# Patient Record
Sex: FEMALE | Race: BLACK OR AFRICAN AMERICAN | Employment: UNEMPLOYED | URBAN - METROPOLITAN AREA
[De-identification: names, ages, dates, MRNs, and addresses within clinical notes are randomized per-mention and may not be internally consistent; named-entity substitution may affect disease eponyms.]

---

## 2017-09-12 LAB — PAP SMEAR, EXTERNAL: NORMAL

## 2019-01-03 ENCOUNTER — OFFICE VISIT (OUTPATIENT)
Dept: FAMILY MEDICINE CLINIC | Age: 54
End: 2019-01-03

## 2019-01-03 VITALS
HEIGHT: 64 IN | TEMPERATURE: 98.9 F | OXYGEN SATURATION: 98 % | RESPIRATION RATE: 18 BRPM | SYSTOLIC BLOOD PRESSURE: 161 MMHG | HEART RATE: 97 BPM | DIASTOLIC BLOOD PRESSURE: 107 MMHG | BODY MASS INDEX: 32.04 KG/M2 | WEIGHT: 187.7 LBS

## 2019-01-03 DIAGNOSIS — I10 ESSENTIAL HYPERTENSION: ICD-10-CM

## 2019-01-03 DIAGNOSIS — G89.29 CHRONIC LEFT SHOULDER PAIN: Primary | ICD-10-CM

## 2019-01-03 DIAGNOSIS — F32.89 OTHER DEPRESSION: ICD-10-CM

## 2019-01-03 DIAGNOSIS — M25.512 CHRONIC LEFT SHOULDER PAIN: Primary | ICD-10-CM

## 2019-01-03 DIAGNOSIS — D50.9 IRON DEFICIENCY ANEMIA, UNSPECIFIED IRON DEFICIENCY ANEMIA TYPE: ICD-10-CM

## 2019-01-03 PROBLEM — E55.9 VITAMIN D DEFICIENCY: Status: ACTIVE | Noted: 2018-03-20

## 2019-01-03 PROBLEM — F32.1 MODERATE MAJOR DEPRESSION (HCC): Status: ACTIVE | Noted: 2019-01-03

## 2019-01-03 PROBLEM — D25.9 UTERINE LEIOMYOMA: Status: ACTIVE | Noted: 2017-09-12

## 2019-01-03 PROBLEM — E78.2 MIXED HYPERLIPIDEMIA: Status: ACTIVE | Noted: 2018-03-20

## 2019-01-03 PROBLEM — F32.A DEPRESSION: Status: ACTIVE | Noted: 2017-06-15

## 2019-01-03 RX ORDER — ERGOCALCIFEROL 1.25 MG/1
50000 CAPSULE ORAL
Qty: 4 CAP | Refills: 2 | Status: SHIPPED | OUTPATIENT
Start: 2019-01-03 | End: 2019-03-28

## 2019-01-03 RX ORDER — OXYCODONE HYDROCHLORIDE 5 MG/1
TABLET ORAL
Refills: 0 | COMMUNITY
Start: 2018-12-28 | End: 2019-04-25 | Stop reason: ALTCHOICE

## 2019-01-03 RX ORDER — DICLOFENAC SODIUM 75 MG/1
TABLET, DELAYED RELEASE ORAL
Refills: 1 | COMMUNITY
Start: 2018-12-28 | End: 2019-04-25 | Stop reason: ALTCHOICE

## 2019-01-03 RX ORDER — ERGOCALCIFEROL 1.25 MG/1
50000 CAPSULE ORAL
COMMUNITY
Start: 2018-12-27 | End: 2019-01-03

## 2019-01-03 RX ORDER — AMLODIPINE BESYLATE 10 MG/1
10 TABLET ORAL DAILY
Qty: 90 TAB | Refills: 3 | Status: SHIPPED | OUTPATIENT
Start: 2019-01-03 | End: 2019-11-29

## 2019-01-03 RX ORDER — AMLODIPINE BESYLATE 5 MG/1
1 TABLET ORAL
COMMUNITY
Start: 2018-05-08 | End: 2019-01-03 | Stop reason: SDUPTHER

## 2019-01-03 RX ORDER — ESCITALOPRAM OXALATE 10 MG/1
10 TABLET ORAL DAILY
Qty: 30 TAB | Refills: 2 | Status: SHIPPED | OUTPATIENT
Start: 2019-01-03 | End: 2019-01-22

## 2019-01-03 NOTE — PROGRESS NOTES
Geo Weldon presents today for   Chief Complaint   Patient presents with   [facility]    Shoulder Pain     Patient stated that she had two left shoulder surgery and still don't have full range of motion. Patient stated that her pain is 6/10.  Medication Refill    Depression     Patient stated that she been depressed for sometime now. Is someone accompanying this pt? no    Is the patient using any DME equipment during OV? no    Depression Screening:  PHQ over the last two weeks 1/3/2019   Little interest or pleasure in doing things Nearly every day   Feeling down, depressed, irritable, or hopeless Nearly every day   Total Score PHQ 2 6   Trouble falling or staying asleep, or sleeping too much More than half the days   Feeling tired or having little energy More than half the days   Poor appetite, weight loss, or overeating Nearly every day   Feeling bad about yourself - or that you are a failure or have let yourself or your family down Nearly every day   Trouble concentrating on things such as school, work, reading, or watching TV Nearly every day   Moving or speaking so slowly that other people could have noticed; or the opposite being so fidgety that others notice Nearly every day   Thoughts of being better off dead, or hurting yourself in some way Nearly every day   PHQ 9 Score 25   How difficult have these problems made it for you to do your work, take care of your home and get along with others Very difficult       Learning Assessment:  Learning Assessment 1/3/2019   PRIMARY LEARNER Patient   HIGHEST LEVEL OF EDUCATION - PRIMARY LEARNER  GRADUATED HIGH SCHOOL OR GED   BARRIERS PRIMARY LEARNER NONE   CO-LEARNER CAREGIVER No   PRIMARY LANGUAGE ENGLISH   LEARNER PREFERENCE PRIMARY LISTENING   ANSWERED BY self   RELATIONSHIP SELF       Abuse Screening:  Abuse Screening Questionnaire 1/3/2019   Do you ever feel afraid of your partner?  N   Are you in a relationship with someone who physically or mentally threatens you? N   Is it safe for you to go home? Y           Health Maintenance Due   Topic Date Due    DTaP/Tdap/Td series (1 - Tdap) 02/01/1987    PAP AKA CERVICAL CYTOLOGY  02/01/1987    Shingrix Vaccine Age 50> (1 of 2) 02/01/2016    BREAST CANCER SCRN MAMMOGRAM  02/01/2016    FOBT Q 1 YEAR AGE 50-75  02/01/2016    Influenza Age 9 to Adult  08/01/2018   . Health Maintenance reviewed and discussed and ordered per Provider. Dominique Prado is updated on all       Coordination of Care:  1. Have you been to the ER, urgent care clinic since your last visit? Hospitalized since your last visit? No    2. Have you seen or consulted any other health care providers outside of the [address] [place] [address] since your last visit? Include any pap smears or colon screening. No    Per-Dr. Farzad López ok medication not taking to be deleted. Advance Directive:  1. Do you have an advance directive in place? Patient Reply:no    2. If not, would you like material regarding how to put one in place? Patient Reply: no    Provider notified of depression screening. Patient stated that she has thoughts of harming herself.
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
## 2019-01-03 NOTE — PATIENT INSTRUCTIONS
Patient Instructions:    1. For your L shoulder pain  Get your bloodwork done this week  After the bloodwork is done, then get your MRI with contrast done (Scheduling will call to arrange the appointment)  Schedule an appointment with Orthopedics Dr. Ondina Moses [phone]    2. For your High Blood Pressure  Increase your Amlodipine from 5mg to 10mg daily   the new prescription at the pharmacy    3. For your Depression  Start Lexapro 10mg daily  Return in 2 wks  If your depression gets worse, especially if you are actively considering hurting yourself or someone else, then go immediately to the ER. Frozen Shoulder: Care Instructions  Your Care Instructions    Frozen shoulder is stiffness, pain, and trouble moving your shoulder. It may happen after an injury or overuse, or from a disease such as diabetes or a stroke. You may have pain that keeps you from using your shoulder. However, you need to move your shoulder. If you do not move it, it will get more stiff and sore. Your doctor may order an X-ray to make sure there is not another cause for your stiff shoulder. You can treat frozen shoulder with heat, stretching, over-the-counter pain medicines, and physical therapy. Your doctor also may inject medicine into your shoulder to reduce pain and swelling. It can take a year or more to get better. Surgery is almost never needed. Follow-up care is a key part of your treatment and safety. Be sure to make and go to all appointments, and call your doctor if you are having problems. It's also a good idea to know your test results and keep a list of the medicines you take. How can you care for yourself at home? · Take pain medicines exactly as directed. ? If the doctor gave you a prescription medicine for pain, take it as prescribed. ? If you are not taking a prescription pain medicine, ask your doctor if you can take an over-the-counter medicine.   · Put a heating pad set on low or a warm, wet towel wrapped in plastic on your shoulder. The heat may make it easier to stretch your shoulder. · Follow your doctor's advice for stretches and exercises. · Go to physical therapy if your doctor suggests it. · Try these stretching exercises to reduce stiffness if your doctor says it is okay. Do the exercises slowly to avoid injury. Put a warm, wet towel on your shoulder before exercising. Stop any exercise that increases pain. ? Pendulum exercise. While leaning forward and holding onto a table or the back of a chair with your good arm, bend at the waist, allowing your affected arm to hang straight down. Swing the affected arm back and forth like a pendulum, then in circles that start small and gradually grow larger as pain allows. Try this for about 2 or 3 minutes, several times a day. Once pain begins to go away, you can do this exercise while holding a 1- or 2-pound weight. ? Wall climbing (to the side). Stand with your side to a wall so that your fingers can just touch it. Then turn so your body is turned slightly toward the wall. Walk the fingers of your injured arm up the wall as high as pain permits. Hold that position for a count of 15 to 30 seconds. Walk your fingers down to the starting position. Repeat 2 to 4 times, trying to reach higher each time. ? Wall climbing (to the front). Face a wall, standing so your fingers can just touch it. Walk the fingers of your affected arm up the wall as high as pain permits. Hold that position for a count of 15 to 30 seconds. Slowly walk your fingers to the starting position. Repeat 2 to 4 times, trying to reach higher each time. When should you call for help?   Call your doctor now or seek immediate medical care if:    · You have severe pain.     · Your arm is cool or pale or changes color.     · You have tingling or numbness in your arm.    Watch closely for changes in your health, and be sure to contact your doctor if:    · You have increased pain.     · You have new pain that develops in another area. For example, you have pain in your arm, hand, or elbow.     · You do not get better as expected. Where can you learn more? Go to http://liz-rianna.info/. Enter C075 in the search box to learn more about \"Frozen Shoulder: Care Instructions. \"  Current as of: November 29, 2017  Content Version: 11.8  © 5829-5969 [employer]. Care instructions adapted under license by [employer] (which disclaims liability or warranty for this information). If you have questions about a medical condition or this instruction, always ask your healthcare professional. [place] [address] any warranty or liability for your use of this information. High Blood Pressure: Care Instructions  Your Care Instructions    If your blood pressure is usually above 130/80, you have high blood pressure, or hypertension. That means the top number is 130 or higher or the bottom number is 80 or higher, or both. Despite what a lot of people think, high blood pressure usually doesn't cause headaches or make you feel dizzy or lightheaded. It usually has no symptoms. But it does increase your risk for heart attack, stroke, and kidney or eye damage. The higher your blood pressure, the more your risk increases. Your doctor will give you a goal for your blood pressure. Your goal will be based on your health and your age. Lifestyle changes, such as eating healthy and being active, are always important to help lower blood pressure. You might also take medicine to reach your blood pressure goal.  Follow-up care is a key part of your treatment and safety. Be sure to make and go to all appointments, and call your doctor if you are having problems. It's also a good idea to know your test results and keep a list of the medicines you take. How can you care for yourself at home?   Medical treatment  · If you stop taking your medicine, your blood pressure will go back up. You may take one or more types of medicine to lower your blood pressure. Be safe with medicines. Take your medicine exactly as prescribed. Call your doctor if you think you are having a problem with your medicine. · Talk to your doctor before you start taking aspirin every day. Aspirin can help certain people lower their risk of a heart attack or stroke. But taking aspirin isn't right for everyone, because it can cause serious bleeding. · See your doctor regularly. You may need to see the doctor more often at first or until your blood pressure comes down. · If you are taking blood pressure medicine, talk to your doctor before you take decongestants or anti-inflammatory medicine, such as ibuprofen. Some of these medicines can raise blood pressure. · Learn how to check your blood pressure at home. Lifestyle changes  · Stay at a healthy weight. This is especially important if you put on weight around the waist. Losing even 10 pounds can help you lower your blood pressure. · If your doctor recommends it, get more exercise. Walking is a good choice. Bit by bit, increase the amount you walk every day. Try for at least 30 minutes on most days of the week. You also may want to swim, bike, or do other activities. · Avoid or limit alcohol. Talk to your doctor about whether you can drink any alcohol. · Try to limit how much sodium you eat to less than 2,300 milligrams (mg) a day. Your doctor may ask you to try to eat less than 1,500 mg a day. · Eat plenty of fruits (such as bananas and oranges), vegetables, legumes, whole grains, and low-fat dairy products. · Lower the amount of saturated fat in your diet. Saturated fat is found in animal products such as milk, cheese, and meat. Limiting these foods may help you lose weight and also lower your risk for heart disease. · Do not smoke. Smoking increases your risk for heart attack and stroke.  If you need help quitting, talk to your doctor about stop-smoking programs and medicines. These can increase your chances of quitting for good. When should you call for help? Call 911 anytime you think you may need emergency care. This may mean having symptoms that suggest that your blood pressure is causing a serious heart or blood vessel problem. Your blood pressure may be over 180/120.   For example, call 911 if:    · You have symptoms of a heart attack. These may include:  ? Chest pain or pressure, or a strange feeling in the chest.  ? Sweating. ? Shortness of breath. ? Nausea or vomiting. ? Pain, pressure, or a strange feeling in the back, neck, jaw, or upper belly or in one or both shoulders or arms. ? Lightheadedness or sudden weakness. ? A fast or irregular heartbeat.     · You have symptoms of a stroke. These may include:  ? Sudden numbness, tingling, weakness, or loss of movement in your face, arm, or leg, especially on only one side of your body. ? Sudden vision changes. ? Sudden trouble speaking. ? Sudden confusion or trouble understanding simple statements. ? Sudden problems with walking or balance. ? A sudden, severe headache that is different from past headaches.     · You have severe back or belly pain.    Do not wait until your blood pressure comes down on its own. Get help right away.   Call your doctor now or seek immediate care if:    · Your blood pressure is much higher than normal (such as 180/120 or higher), but you don't have symptoms.     · You think high blood pressure is causing symptoms, such as:  ? Severe headache.  ? Blurry vision.    Watch closely for changes in your health, and be sure to contact your doctor if:    · Your blood pressure measures higher than your doctor recommends at least 2 times. That means the top number is higher or the bottom number is higher, or both.     · You think you may be having side effects from your blood pressure medicine. Where can you learn more?   Go to http://liz-rianna.info/. Enter L490 in the search box to learn more about \"High Blood Pressure: Care Instructions. \"  Current as of: December 6, 2017  Content Version: 11.8  © 8442-8213 [employer]. Care instructions adapted under license by [employer] (which disclaims liability or warranty for this information). If you have questions about a medical condition or this instruction, always ask your healthcare professional. [place] [address] any warranty or liability for your use of this information. Depression and Chronic Disease: Care Instructions  Your Care Instructions    A chronic disease is one that you have for a long time. Some chronic diseases can be controlled, but they usually cannot be cured. Depression is common in people with chronic diseases, but it often goes unnoticed. Many people have concerns about seeking treatment for a mental health problem. You may think it's a sign of weakness, or you don't want people to know about it. It's important to overcome these reasons for not seeking treatment. Treating depression or anxiety is good for your health. Follow-up care is a key part of your treatment and safety. Be sure to make and go to all appointments, and call your doctor if you are having problems. It's also a good idea to know your test results and keep a list of the medicines you take. How can you care for yourself at home? Watch for symptoms of depression  The symptoms of depression are often subtle at first. You may think they are caused by your disease rather than depression. Or you may think it is normal to be depressed when you have a chronic disease. If you are depressed you may:  · Feel sad or hopeless. · Feel guilty or worthless. · Not enjoy the things you used to enjoy. · Feel hopeless, as though life is not worth living. · Have trouble thinking or remembering.   · Have low energy, and you may not eat or sleep well.  · Pull away from others. · Think often about death or killing yourself. (Keep the numbers for these national suicide hotlines: 3-374-242-TALK [1-900.445.8664] and 6-912-TFPRAAN [1-604.640.6209]. )  Get treatment  By treating your depression, you can feel more hopeful and have more energy. If you feel better, you may take better care of yourself, so your health may improve. · Talk to your doctor if you have any changes in mood during treatment for your disease. · Ask your doctor for help. Counseling, antidepressant medicine, or a combination of the two can help most people with depression. Often a combination works best. Counseling can also help you cope with having a chronic disease. When should you call for help? Call 911 anytime you think you may need emergency care. For example, call if:    · You feel like hurting yourself or someone else.     · Someone you know has depression and is about to attempt or is attempting suicide.   [facility] your doctor now or seek immediate medical care if:    · You hear voices.     · Someone you know has depression and:  ? Starts to give away his or her possessions. ? Uses illegal drugs or drinks alcohol heavily. ? Talks or writes about death, including writing suicide notes or talking about guns, knives, or pills. ? Starts to spend a lot of time alone. ? Acts very aggressively or suddenly appears calm.    Watch closely for changes in your health, and be sure to contact your doctor if:    · You do not get better as expected. Where can you learn more? Go to http://liz-rianna.info/. Enter H706 in the search box to learn more about \"Depression and Chronic Disease: Care Instructions. \"  Current as of: December 7, 2017  Content Version: 11.8  © 7097-1373 [employer]. Care instructions adapted under license by [employer] (which disclaims liability or warranty for this information).  If you have questions about a medical condition or this instruction, always ask your healthcare professional. [place] [address] any warranty or liability for your use of this information.

## 2019-01-08 ENCOUNTER — HOSPITAL ENCOUNTER (OUTPATIENT)
Dept: LAB | Age: 54
Discharge: HOME | End: 2019-01-08
Payer: MEDICAID

## 2019-01-08 DIAGNOSIS — D50.9 IRON DEFICIENCY ANEMIA, UNSPECIFIED IRON DEFICIENCY ANEMIA TYPE: ICD-10-CM

## 2019-01-08 DIAGNOSIS — I10 ESSENTIAL HYPERTENSION: ICD-10-CM

## 2019-01-08 LAB
ALBUMIN SERPL-MCNC: 3.6 G/DL (ref 3.4–5)
ALBUMIN/GLOB SERPL: 1 {RATIO} (ref 0.8–1.7)
ALP SERPL-CCNC: 73 U/L (ref 45–117)
ALT SERPL-CCNC: 40 U/L (ref 13–56)
ANION GAP SERPL CALC-SCNC: 7 MMOL/L (ref 3–18)
AST SERPL-CCNC: 34 U/L (ref 15–37)
BASOPHILS # BLD: 0 K/UL (ref 0–0.1)
BASOPHILS NFR BLD: 1 % (ref 0–2)
BILIRUB SERPL-MCNC: 0.5 MG/DL (ref 0.2–1)
BUN SERPL-MCNC: 10 MG/DL (ref 7–18)
BUN/CREAT SERPL: 12 (ref 12–20)
CALCIUM SERPL-MCNC: 8.7 MG/DL (ref 8.5–10.1)
CHLORIDE SERPL-SCNC: 107 MMOL/L (ref 100–108)
CO2 SERPL-SCNC: 27 MMOL/L (ref 21–32)
CREAT SERPL-MCNC: 0.83 MG/DL (ref 0.6–1.3)
DIFFERENTIAL METHOD BLD: ABNORMAL
EOSINOPHIL # BLD: 0.1 K/UL (ref 0–0.4)
EOSINOPHIL NFR BLD: 2 % (ref 0–5)
ERYTHROCYTE [DISTWIDTH] IN BLOOD BY AUTOMATED COUNT: 15.7 % (ref 11.6–14.5)
GLOBULIN SER CALC-MCNC: 3.5 G/DL (ref 2–4)
GLUCOSE SERPL-MCNC: 103 MG/DL (ref 74–99)
HCT VFR BLD AUTO: 33.5 % (ref 35–45)
HGB BLD-MCNC: 11.7 G/DL (ref 12–16)
IRON SATN MFR SERPL: 14 %
IRON SERPL-MCNC: 53 UG/DL (ref 50–175)
LYMPHOCYTES # BLD: 2.1 K/UL (ref 0.9–3.6)
LYMPHOCYTES NFR BLD: 45 % (ref 21–52)
MCH RBC QN AUTO: 26.2 PG (ref 24–34)
MCHC RBC AUTO-ENTMCNC: 34.9 G/DL (ref 31–37)
MCV RBC AUTO: 75.1 FL (ref 74–97)
MONOCYTES # BLD: 0.4 K/UL (ref 0.05–1.2)
MONOCYTES NFR BLD: 10 % (ref 3–10)
NEUTS SEG # BLD: 1.9 K/UL (ref 1.8–8)
NEUTS SEG NFR BLD: 42 % (ref 40–73)
PLATELET # BLD AUTO: 260 K/UL (ref 135–420)
PMV BLD AUTO: 10.9 FL (ref 9.2–11.8)
POTASSIUM SERPL-SCNC: 3.7 MMOL/L (ref 3.5–5.5)
PROT SERPL-MCNC: 7.1 G/DL (ref 6.4–8.2)
RBC # BLD AUTO: 4.46 M/UL (ref 4.2–5.3)
SODIUM SERPL-SCNC: 141 MMOL/L (ref 136–145)
TIBC SERPL-MCNC: 392 UG/DL (ref 250–450)
WBC # BLD AUTO: 4.5 K/UL (ref 4.6–13.2)

## 2019-01-08 PROCEDURE — 83540 ASSAY OF IRON: CPT

## 2019-01-08 PROCEDURE — 85025 COMPLETE CBC W/AUTO DIFF WBC: CPT

## 2019-01-08 PROCEDURE — 80053 COMPREHEN METABOLIC PANEL: CPT

## 2019-01-08 PROCEDURE — 36415 COLL VENOUS BLD VENIPUNCTURE: CPT

## 2019-01-09 NOTE — PROGRESS NOTES
Lab results reviewed - letter sent, pt will f/u in 2 wks to discuss. Her anemia is long-standing and not a new finding.

## 2019-01-10 DIAGNOSIS — G89.29 CHRONIC LEFT SHOULDER PAIN: Primary | ICD-10-CM

## 2019-01-10 DIAGNOSIS — M25.512 CHRONIC LEFT SHOULDER PAIN: Primary | ICD-10-CM

## 2019-01-10 NOTE — PROGRESS NOTES
Received faxed request from radiology center regarding the MRI with contrast of the L shoulder ordered for this pt to r/o OM given her pain and decreased ROM after surgery - per the radiologist, pt needs arthrogram of the L shoulder ordered along with the MRI to have procedure done. Order for Arthrogram placed.

## 2019-01-22 ENCOUNTER — OFFICE VISIT (OUTPATIENT)
Dept: FAMILY MEDICINE CLINIC | Age: 54
End: 2019-01-22

## 2019-01-22 VITALS
BODY MASS INDEX: 31.72 KG/M2 | WEIGHT: 185.8 LBS | TEMPERATURE: 97.4 F | RESPIRATION RATE: 18 BRPM | HEIGHT: 64 IN | HEART RATE: 83 BPM | SYSTOLIC BLOOD PRESSURE: 130 MMHG | DIASTOLIC BLOOD PRESSURE: 89 MMHG

## 2019-01-22 DIAGNOSIS — I10 ESSENTIAL HYPERTENSION: Primary | ICD-10-CM

## 2019-01-22 DIAGNOSIS — D72.819 LEUKOPENIA, UNSPECIFIED TYPE: ICD-10-CM

## 2019-01-22 DIAGNOSIS — F33.2 SEVERE EPISODE OF RECURRENT MAJOR DEPRESSIVE DISORDER, WITHOUT PSYCHOTIC FEATURES (HCC): ICD-10-CM

## 2019-01-22 DIAGNOSIS — F32.89 OTHER DEPRESSION: ICD-10-CM

## 2019-01-22 DIAGNOSIS — E55.9 VITAMIN D DEFICIENCY: ICD-10-CM

## 2019-01-22 DIAGNOSIS — K59.03 DRUG-INDUCED CONSTIPATION: ICD-10-CM

## 2019-01-22 DIAGNOSIS — E78.2 MIXED HYPERLIPIDEMIA: ICD-10-CM

## 2019-01-22 DIAGNOSIS — D50.8 OTHER IRON DEFICIENCY ANEMIA: ICD-10-CM

## 2019-01-22 RX ORDER — DOCUSATE SODIUM 100 MG/1
100 CAPSULE, LIQUID FILLED ORAL 2 TIMES DAILY
Qty: 60 CAP | Refills: 2 | Status: SHIPPED | OUTPATIENT
Start: 2019-01-22 | End: 2019-04-22

## 2019-01-22 RX ORDER — ESCITALOPRAM OXALATE 20 MG/1
20 TABLET ORAL DAILY
Qty: 90 TAB | Refills: 3 | Status: SHIPPED | OUTPATIENT
Start: 2019-01-22 | End: 2020-12-02 | Stop reason: ALTCHOICE

## 2019-01-22 NOTE — PROGRESS NOTES
Janet Araiza is here for 2 week follow up of her labwork results and depression and HTN    At last visit, her BP was high but she had run out of her amlodipine 5mg daily - at the last visit her amlodipine dose was increase to 10mg daily and she is taking that and her BP is now at goal 130/80 and much better than last visit. She has not checked any home Bps since last visit. Her CBC showed a stable anemia with mild leukopenia and normal iron levels - she is currently taking iron supplements and has had long-standing anemia due to fibroids. Had a normal colonoscopy 1 year ago. CMP was normal.     At last visit she was having significant depression episodes and was started on Lexapro 10mg daily. She reports little improvement since last visit. Denies SI/HI. PHQ-9 shows that she has symptoms nearly every day for 7 out of 9 questions. Depression is making her daily life somewhat difficult. She reports her stool has become hard since starting the lexapro. She has the MRI of the shoulder scheduled for 1/28/19. Surgical History  Past Surgical History:   Procedure Laterality Date    HX SHOULDER ARTHROSCOPY Left 2017 and oct 2018        Medications  Current Outpatient Medications   Medication Sig Dispense Refill    MULTIVITAMIN-IRON-FOLIC ACID PO Take  by Mouth.  diclofenac EC (VOLTAREN) 75 mg EC tablet TAKE 1 TABLET BY MOUTH TWICE A DAY  1    oxyCODONE IR (ROXICODONE) 5 mg immediate release tablet TAKE 1 TABLET BY MOUTH EVERY 6 HOURS AS NEEDED FOR PAIN  0    ergocalciferol (ERGOCALCIFEROL) 50,000 unit capsule Take 1 Cap by mouth every seven (7) days for 84 days. 4 Cap 2    amLODIPine (NORVASC) 10 mg tablet Take 1 Tab by mouth daily. 90 Tab 3    escitalopram oxalate (LEXAPRO) 10 mg tablet Take 1 Tab by mouth daily.  30 Tab 2       Allergies  Allergies   Allergen Reactions    Prednisone Other (comments)     Coughs up blood         Family History  Family History   Problem Relation Age of Onset    Diabetes Mother     Heart Attack Mother     Diabetes Father    Matt Prior Lupus Sister     Depression Other     Depression Paternal Uncle        Social History  Social History     Socioeconomic History    Marital status: UNKNOWN     Spouse name: Not on file    Number of children: Not on file    Years of education: Not on file    Highest education level: Not on file   Social Needs    Financial resource strain: Not on file    Food insecurity - worry: Not on file    Food insecurity - inability: Not on file   [employer] needs - medical: Not on file   [employer] needs - non-medical: Not on file   Occupational History    Not on file   Tobacco Use    Smoking status: Not on file   Substance and Sexual Activity    Alcohol use: Not on file    Drug use: Not on file    Sexual activity: Not on file   Other Topics Concern    Not on file   Social History Narrative    Not on file       Problem List  Patient Active Problem List   Diagnosis Code    Moderate major depression ([facility] [address] F32.1    Depression F32.9    HTN (hypertension) I10    Mixed hyperlipidemia E78.2    Uterine leiomyoma D25.9    Vitamin D deficiency E55.9    GRETCHEN (iron deficiency anemia) D50.9       Review of Systems  ROS   Review of Systems   Constitutional: Negative. HENT: Negative. Respiratory: Negative. Cardiovascular: Negative. Psych: Depression, stable, as per HPI  All other systems reviewed and are negative. Vital Signs  Vitals:    01/22/19 1029   BP: 130/89   Pulse: 83   Resp: 18   Temp: 97.4 °F (36.3 °C)   TempSrc: Oral   Weight: 185 lb 12.8 oz (84.3 kg)   Height: 5' 4\" (1.626 m)   PainSc:   0 - No pain   LMP: 01/22/2019       Physical Exam  Physical Exam   General:   Alert, calm,  in no acute distress. HEENT:  NC/AT, Anicteric, MMM, OP clear, NL nares, TMs pearly gray B/L, Neck supple. No cervical LAD. Cardiovasc:   RRR, no MRG  Pulmonary:   Lungs clear bilaterally. Normal respiratory effort.  No wheezing, ronchi or rales, equal BS B/L, Good air entry. Extremities:   No edema, no TTP bilateral calves. LEs warm and well-perfused. Neuro:   Alert and oriented, no focal deficits. Psych:  Mood improved, still flat affect and depressed but not tearful. Answers questions appropriately. Good judgement. Denies SI/HI      Diagnostics    MRI and Arthrogram of L shoulder pending - scheduled for 1/28/19    Results  Results for orders placed or performed during the hospital encounter of 92/67/80   METABOLIC PANEL, COMPREHENSIVE   Result Value Ref Range    Sodium 141 136 - 145 mmol/L    Potassium 3.7 3.5 - 5.5 mmol/L    Chloride 107 100 - 108 mmol/L    CO2 27 21 - 32 mmol/L    Anion gap 7 3.0 - 18 mmol/L    Glucose 103 (H) 74 - 99 mg/dL    BUN 10 7.0 - 18 MG/DL    Creatinine 0.83 0.6 - 1.3 MG/DL    BUN/Creatinine ratio 12 12 - 20      GFR est AA >60 >60 ml/min/1.73m2    GFR est non-AA >60 >60 ml/min/1.73m2    Calcium 8.7 8.5 - 10.1 MG/DL    Bilirubin, total 0.5 0.2 - 1.0 MG/DL    ALT (SGPT) 40 13 - 56 U/L    AST (SGOT) 34 15 - 37 U/L    Alk. phosphatase 73 45 - 117 U/L    Protein, total 7.1 6.4 - 8.2 g/dL    Albumin 3.6 3.4 - 5.0 g/dL    Globulin 3.5 2.0 - 4.0 g/dL    A-G Ratio 1.0 0.8 - 1.7     CBC WITH AUTOMATED DIFF   Result Value Ref Range    WBC 4.5 (L) 4.6 - 13.2 K/uL    RBC 4.46 4.20 - 5.30 M/uL    HGB 11.7 (L) 12.0 - 16.0 g/dL    HCT 33.5 (L) 35.0 - 45.0 %    MCV 75.1 74.0 - 97.0 FL    MCH 26.2 24.0 - 34.0 PG    MCHC 34.9 31.0 - 37.0 g/dL    RDW 15.7 (H) 11.6 - 14.5 %    PLATELET 622 764 - 354 K/uL    MPV 10.9 9.2 - 11.8 FL    NEUTROPHILS 42 40 - 73 %    LYMPHOCYTES 45 21 - 52 %    MONOCYTES 10 3 - 10 %    EOSINOPHILS 2 0 - 5 %    BASOPHILS 1 0 - 2 %    ABS. NEUTROPHILS 1.9 1.8 - 8.0 K/UL    ABS. LYMPHOCYTES 2.1 0.9 - 3.6 K/UL    ABS. MONOCYTES 0.4 0.05 - 1.2 K/UL    ABS. EOSINOPHILS 0.1 0.0 - 0.4 K/UL    ABS.  BASOPHILS 0.0 0.0 - 0.1 K/UL    DF AUTOMATED     IRON PROFILE   Result Value Ref Range    Iron 53 50 - 175 ug/dL    TIBC 392 250 - 450 ug/dL    Iron % saturation 14 %               Assessment and Plan    ICD-10-CM ICD-9-CM    1. Essential hypertension I10 401.9    2. Severe episode of recurrent major depressive disorder, without psychotic features ([facility] [address] F33.2 296.33    3. Mixed hyperlipidemia E78.2 272.2 LIPID PANEL   4. Vitamin D deficiency E55.9 268.9 VITAMIN D, 25 HYDROXY   5. Other iron deficiency anemia D50.8 280.8 CBC WITH AUTOMATED DIFF   6. Leukopenia, unspecified type D72.819 288.50 CBC WITH AUTOMATED DIFF   7. Drug-induced constipation K59.03 564.09 docusate sodium (COLACE) 100 mg capsule     E980.5    8. Other depression F32.89 311 escitalopram oxalate (LEXAPRO) 20 mg tablet      TSH AND FREE T4      REFERRAL TO PSYCHIATRY     Orders Placed This Encounter    CBC WITH AUTOMATED DIFF     Standing Status:   Future     Standing Expiration Date:   1/23/2020    LIPID PANEL     Standing Status:   Future     Standing Expiration Date:   1/23/2020    VITAMIN D, 25 HYDROXY     Standing Status:   Future     Standing Expiration Date:   1/23/2020    TSH AND FREE T4     Standing Status:   Future     Standing Expiration Date:   1/23/2020    REFERRAL TO PSYCHIATRY     Referral Priority:   Routine     Referral Type:   Behavioral Health     Referral Reason:   Specialty Services Required     Referred to Provider:   Martha Claire MD     Requested Specialty:   Psychiatry     Number of Visits Requested:   1    docusate sodium (COLACE) 100 mg capsule     Sig: Take 1 Cap by mouth two (2) times a day for 90 days. Dispense:  60 Cap     Refill:  2    escitalopram oxalate (LEXAPRO) 20 mg tablet     Sig: Take 1 Tab by mouth daily. Dispense:  90 Tab     Refill:  3     1. HTN  Well controlled  Continue Amlodipine 10mg daily  DASH diet discussed - handout given with AVS    2.  MDD  She still feels depressed and does not feel much improvement so far after 2 wks of Lexapro 10mg daily   She denies SI/HI but has symptoms nearly every day for 7 of the 9 PHQ-9 questions  Advised her to increase Lexapro from 10mg to 20mg daily  Advised her to increase exercise gradually to a goal of 30-60 min 4-6 days per week and lose weight  Also discussed techniques such as yoga and meditation to control depression  Discussed that there is newer research showing that Mediterranean Diet may help improve mood/depression - Discussed Mediterranean diet, she is already a vegetarian and also does not eat dairy but she does eat fish - Mediterranean diet handout given  Referral to psychiatry given today as well to assist with med titration and psychotherapy  Advised to f/u in 3 wks but return sooner if her symptoms are worsening - check TFTs with next labs in 3 wks  Go to the ER if you develop SI/HI which she currently denies    3. MIXED HLD  She has not had her lipids checked in over a year  Advised will check fasting lipids at next visit in 3 wks - advised her to come fasting for that visit. 4. VIT D DEF  Continue Ergocalciferol weekly  Check Vit D level with next set of labs in 3 wks    5. IRON DEF ANEMIA  Reviewed all labs dated 1/8/19 with patient today - CBC shows stable anemia, iron panel is normal but she is taking iron supplement and this has been long-standing anemia due to fibroids  She reports she had a normal colonoscopy 1 year ago  Continue iron supplements    6. LEUKOPENIA  She has mild leukopenia - this could be genetic or transient  Recommend repeat CBC at next visit in 3 wks     7. CONSTIPATION  This just started after initiating Lexapro so it is likely a side effect  Advised to start Miralax dissolve 1 capful in 8oz of liquid and take it daily.  May increase to BID for 3 days if you do not have a BM for 3 or more days  Increase water intake to 10-12 glasses per day, eat diet high in fiber with plenty of fruits and veggies  If your constipation does not improve/resolve with miralax and dietary changes, then add colace stool softener 100mg BID    After care summary printed and reviewed with patient. Plan reviewed with patient. Questions answered. Patient verbalized understanding of plan and is in agreement with plan. Patient to follow up in one week or earlier if symptoms worsen. Return to work letter given. Encouraged the use of my chart. Ximena Montemayor MD, [place].[facility]  Internal Medicine/Pediatrics

## 2019-01-22 NOTE — PATIENT INSTRUCTIONS
1. For your Constipation: Drink 10-12 glasses of water per day, eat plenty of fruits and vegetables. Start Over-the-counter Miralax 1 capful dissolved in 8 oz of water once a day to maintain soft stools. If you have not had a bowel movement in 3 days or more then take 1 capful dissolved in 8oz of liquid twice a day for 3 days until you have passed a soft stool and then return to once a day dosing. If the Miralax is not working then add colace stool softener twice a day    2. For your high BP: continue Amlodipine 10mg daily    3. For your depression: increase your Lexapro from 10mg daily to 20mg daily, try a Mediterranean diet to improve mood. Try exercising, yoga, meditation to improve mood - goal is to exercise 30-60 min 4-6 days per week. Also, schedule an appointment with psychiatry    Return in 3 wks     Recovering From Depression: Care Instructions  Your Care Instructions    Taking good care of yourself is important as you recover from depression. In time, your symptoms will fade as your treatment takes hold. Do not give up. Instead, focus your energy on getting better. Your mood will improve. It just takes some time. Focus on things that can help you feel better, such as being with friends and family, eating well, and getting enough rest. But take things slowly. Do not do too much too soon. You will begin to feel better gradually. Follow-up care is a key part of your treatment and safety. Be sure to make and go to all appointments, and call your doctor if you are having problems. It's also a good idea to know your test results and keep a list of the medicines you take. How can you care for yourself at home? Be realistic  · If you have a large task to do, break it up into smaller steps you can handle, and just do what you can. · You may want to put off important decisions until your depression has lifted.  If you have plans that will have a major impact on your life, such as marriage, divorce, or a job change, try to wait a bit. Talk it over with friends and loved ones who can help you look at the overall picture first.  · Reaching out to people for help is important. Do not isolate yourself. Let your family and friends help you. Find someone you can trust and confide in, and talk to that person. · Be patient, and be kind to yourself. Remember that depression is not your fault and is not something you can overcome with willpower alone. Treatment is necessary for depression, just like for any other illness. Feeling better takes time, and your mood will improve little by little. Stay active  · Stay busy and get outside. Take a walk, or try some other light exercise. · Talk with your doctor about an exercise program. Exercise can help with mild depression. · Go to a movie or concert. Take part in a Spiritism activity or other social gathering. Go to a [employer] game. · Ask a friend to have dinner with you. Take care of yourself  · Eat a balanced diet with plenty of fresh fruits and vegetables, whole grains, and lean protein. If you have lost your appetite, eat small snacks rather than large meals. · Avoid drinking alcohol or using illegal drugs. Do not take medicines that have not been prescribed for you. They may interfere with medicines you may be taking for depression, or they may make your depression worse. · Take your medicines exactly as they are prescribed. You may start to feel better within 1 to 3 weeks of taking antidepressant medicine. But it can take as many as 6 to 8 weeks to see more improvement. If you have questions or concerns about your medicines, or if you do not notice any improvement by 3 weeks, talk to your doctor. · If you have any side effects from your medicine, tell your doctor. Antidepressants can make you feel tired, dizzy, or nervous. Some people have dry mouth, constipation, headaches, sexual problems, or diarrhea.  Many of these side effects are mild and will go away on their own after you have been taking the medicine for a few weeks. Some may last longer. Talk to your doctor if side effects are bothering you too much. You might be able to try a different medicine. · Get enough sleep. If you have problems sleeping:  ? Go to bed at the same time every night, and get up at the same time every morning. ? Keep your bedroom dark and quiet. ? Do not exercise after 5:00 p.m.  ? Avoid drinks with caffeine after 5:00 p.m. · Avoid sleeping pills unless they are prescribed by the doctor treating your depression. Sleeping pills may make you groggy during the day, and they may interact with other medicine you are taking. · If you have any other illnesses, such as diabetes, heart disease, or high blood pressure, make sure to continue with your treatment. Tell your doctor about all of the medicines you take, including those with or without a prescription. · Keep the numbers for these national suicide hotlines: 0-242-182-TALK (6-991.149.9125) and 6-938-JAINUKK (3-569.388.5563). If you or someone you know talks about suicide or feeling hopeless, get help right away. When should you call for help? Call 911 anytime you think you may need emergency care. For example, call if:    · You feel like hurting yourself or someone else.     · Someone you know has depression and is about to attempt or is attempting suicide.   [facility] your doctor now or seek immediate medical care if:    · You hear voices.     · Someone you know has depression and:  ? Starts to give away his or her possessions. ? Uses illegal drugs or drinks alcohol heavily. ? Talks or writes about death, including writing suicide notes or talking about guns, knives, or pills. ? Starts to spend a lot of time alone. ? Acts very aggressively or suddenly appears calm.    Watch closely for changes in your health, and be sure to contact your doctor if:    · You do not get better as expected. Where can you learn more?   Go to http://liz-rianna.info/. Enter Q789 in the search box to learn more about \"Recovering From Depression: Care Instructions. \"  Current as of: September 11, 2018  Content Version: 11.9  © 7694-4285 [employer], [employer]. Care instructions adapted under license by [employer] (which disclaims liability or warranty for this information). If you have questions about a medical condition or this instruction, always ask your healthcare professional. [address] any warranty or liability for your use of this information. Learning About the [address] Diet  What is the Mediterranean diet? The Mediterranean diet is a style of eating rather than a diet plan. It features foods eaten in [place], [place], [place] and [place], and other countries along the [facility]. It emphasizes eating foods like fish, fruits, vegetables, beans, high-fiber breads and whole grains, nuts, and olive oil. This style of eating includes limited red meat, cheese, and sweets. Why choose the Mediterranean diet? A Mediterranean-style diet may improve heart health. It contains more fat than other heart-healthy diets. But the fats are mainly from nuts, unsaturated oils (such as fish oils and olive oil), and certain nut or seed oils (such as canola, soybean, or flaxseed oil). These fats may help protect the heart and blood vessels. How can you get started on the Mediterranean diet? Here are some things you can do to switch to a more Mediterranean way of eating. What to eat  · Eat a variety of fruits and vegetables each day, such as grapes, blueberries, tomatoes, broccoli, peppers, figs, olives, spinach, eggplant, beans, lentils, and chickpeas. · Eat a variety of whole-grain foods each day, such as oats, brown rice, and whole wheat bread, pasta, and couscous. · Eat fish at least 2 times a week. Try tuna, salmon, mackerel, lake trout, herring, or sardines.   · Eat moderate amounts of low-fat dairy products, such as milk, cheese, or yogurt. · Eat moderate amounts of poultry and eggs. · Choose healthy (unsaturated) fats, such as nuts, olive oil, and certain nut or seed oils like canola, soybean, and flaxseed. · Limit unhealthy (saturated) fats, such as butter, palm oil, and coconut oil. And limit fats found in animal products, such as meat and dairy products made with whole milk. Try to eat red meat only a few times a month in very small amounts. · Limit sweets and desserts to only a few times a week. This includes sugar-sweetened drinks like soda. The Mediterranean diet may also include red wine with your meal--1 glass each day for women and up to 2 glasses a day for men. Tips for eating at home  · Use herbs, spices, garlic, lemon zest, and citrus juice instead of salt to add flavor to foods. · Add avocado slices to your sandwich instead of sidhu. · Have fish for lunch or dinner instead of red meat. Brush the fish with olive oil, and broil or grill it. · Sprinkle your salad with seeds or nuts instead of cheese. · Cook with olive or canola oil instead of butter or oils that are high in saturated fat. · Switch from 2% milk or whole milk to 1% or fat-free milk. · Dip raw vegetables in a vinaigrette dressing or hummus instead of dips made from mayonnaise or sour cream.  · Have a piece of fruit for dessert instead of a piece of cake. Try baked apples, or have some dried fruit. Tips for eating out  · Try broiled, grilled, baked, or poached fish instead of having it fried or breaded. · Ask your  to have your meals prepared with olive oil instead of butter. · Order dishes made with marinara sauce or sauces made from olive oil. Avoid sauces made from cream or mayonnaise. · Choose whole-grain breads, whole wheat pasta and pizza crust, brown rice, beans, and lentils. · Cut back on butter or margarine on bread.  Instead, you can dip your bread in a small amount of olive oil.  · Ask for a side salad or grilled vegetables instead of french fries or chips. Where can you learn more? Go to http://liz-rianna.info/. Enter 176-803-9973 in the search box to learn more about \"Learning About the Mediterranean Diet. \"  Current as of: March 28, 2018  Content Version: 11.9  © 8124-7209 [employer]. Care instructions adapted under license by [employer] (which disclaims liability or warranty for this information). If you have questions about a medical condition or this instruction, always ask your healthcare professional. [place] [address] any warranty or liability for your use of this information.

## 2019-01-22 NOTE — PROGRESS NOTES
aLta Smith presents today for   Chief Complaint   Patient presents with    Depression     follow up    Hypertension    Labs     completed 1/8/19       Is someone accompanying this pt? no    Is the patient using any DME equipment during OV? no    Depression Screening:  PHQ over the last two weeks 1/22/2019   PHQ Not Done Active Diagnosis of Depression or Bipolar Disorder   Little interest or pleasure in doing things Nearly every day   Feeling down, depressed, irritable, or hopeless Nearly every day   Total Score PHQ 2 6   Trouble falling or staying asleep, or sleeping too much Nearly every day   Feeling tired or having little energy Nearly every day   Poor appetite, weight loss, or overeating Nearly every day   Feeling bad about yourself - or that you are a failure or have let yourself or your family down Nearly every day   Trouble concentrating on things such as school, work, reading, or watching TV Nearly every day   Moving or speaking so slowly that other people could have noticed; or the opposite being so fidgety that others notice Several days   Thoughts of being better off dead, or hurting yourself in some way Several days   PHQ 9 Score 23   How difficult have these problems made it for you to do your work, take care of your home and get along with others Somewhat difficult       Learning Assessment:  Learning Assessment 1/3/2019   PRIMARY LEARNER Patient   HIGHEST LEVEL OF EDUCATION - PRIMARY LEARNER  GRADUATED HIGH SCHOOL OR GED   BARRIERS PRIMARY LEARNER NONE   CO-LEARNER CAREGIVER No   PRIMARY LANGUAGE ENGLISH   LEARNER PREFERENCE PRIMARY LISTENING   ANSWERED BY self   RELATIONSHIP SELF       Abuse Screening:  Abuse Screening Questionnaire 1/22/2019   Do you ever feel afraid of your partner? N   Are you in a relationship with someone who physically or mentally threatens you? N   Is it safe for you to go home?  Y           Health Maintenance Due   Topic Date Due    PAP AKA CERVICAL CYTOLOGY  02/01/1987  Shingrix Vaccine Age 50> (1 of 2) 02/01/2016    BREAST CANCER SCRN MAMMOGRAM  02/01/2016    FOBT Q 1 YEAR AGE 50-75  02/01/2016   . Health Maintenance reviewed and discussed and ordered per Provider. Bard Oviedo is updated on all       Coordination of Care:  1. Have you been to the ER, urgent care clinic since your last visit? Hospitalized since your last visit? No    2. Have you seen or consulted any other health care providers outside of the [address] [place] [address] since your last visit? Include any pap smears or colon screening. No    Per-Dr. Juan Francisco Tabor ok medication not taking to be deleted. Advance Directive:  1. Do you have an advance directive in place? Patient Reply:no    2. If not, would you like material regarding how to put one in place? Patient Reply: no    Provider notified of depression screening. Patient denies of any thoughts of harming herself.

## 2019-01-28 ENCOUNTER — HOSPITAL ENCOUNTER (OUTPATIENT)
Dept: GENERAL RADIOLOGY | Age: 54
Discharge: HOME | End: 2019-01-28
Attending: INTERNAL MEDICINE
Payer: MEDICAID

## 2019-01-28 ENCOUNTER — HOSPITAL ENCOUNTER (OUTPATIENT)
Dept: MRI IMAGING | Age: 54
Discharge: HOME | End: 2019-01-28
Attending: INTERNAL MEDICINE
Payer: MEDICAID

## 2019-01-28 DIAGNOSIS — G89.29 CHRONIC LEFT SHOULDER PAIN: ICD-10-CM

## 2019-01-28 DIAGNOSIS — M25.512 CHRONIC LEFT SHOULDER PAIN: ICD-10-CM

## 2019-01-28 PROCEDURE — A9577 INJ MULTIHANCE: HCPCS | Performed by: INTERNAL MEDICINE

## 2019-01-28 PROCEDURE — 23350 INJECTION FOR SHOULDER X-RAY: CPT

## 2019-01-28 PROCEDURE — 74011250636 HC RX REV CODE- 250/636: Performed by: INTERNAL MEDICINE

## 2019-01-28 PROCEDURE — 74011250636 HC RX REV CODE- 250/636

## 2019-01-28 PROCEDURE — 74011636320 HC RX REV CODE- 636/320: Performed by: INTERNAL MEDICINE

## 2019-01-28 PROCEDURE — 73222 MRI JOINT UPR EXTREM W/DYE: CPT

## 2019-01-28 RX ORDER — LIDOCAINE HYDROCHLORIDE 10 MG/ML
INJECTION, SOLUTION EPIDURAL; INFILTRATION; INTRACAUDAL; PERINEURAL
Status: COMPLETED
Start: 2019-01-28 | End: 2019-01-28

## 2019-01-28 RX ORDER — SODIUM CHLORIDE 9 MG/ML
1-20 INJECTION INTRAMUSCULAR; INTRAVENOUS; SUBCUTANEOUS
Status: COMPLETED | OUTPATIENT
Start: 2019-01-28 | End: 2019-01-28

## 2019-01-28 RX ORDER — LIDOCAINE HYDROCHLORIDE 10 MG/ML
1-10 INJECTION, SOLUTION EPIDURAL; INFILTRATION; INTRACAUDAL; PERINEURAL
Status: COMPLETED | OUTPATIENT
Start: 2019-01-28 | End: 2019-01-28

## 2019-01-28 RX ADMIN — LIDOCAINE HYDROCHLORIDE 2 ML: 10 INJECTION, SOLUTION EPIDURAL; INFILTRATION; INTRACAUDAL; PERINEURAL at 11:10

## 2019-01-28 RX ADMIN — SODIUM CHLORIDE 20 ML: 9 INJECTION INTRAMUSCULAR; INTRAVENOUS; SUBCUTANEOUS at 11:05

## 2019-01-28 RX ADMIN — GADOBENATE DIMEGLUMINE 0.1 ML: 529 INJECTION, SOLUTION INTRAVENOUS at 11:09

## 2019-01-28 RX ADMIN — IOPAMIDOL 4 ML: 612 INJECTION, SOLUTION INTRAVENOUS at 11:10

## 2019-01-28 NOTE — H&P
The patient is an appropriate candidate to undergo left shoulder arthrogram..    Patient assessed immediately prior to induction. Anesthesia plan as follows: Local/No Anesthesia. History and Physical update:  H&P was reviewed and the patient was examined. No changes have occurred in the patient's condition since the H&P was completed.     Miguel Angel Galvin MD  Vascular & Interventional Radiology  [facility]  1/28/2019

## 2019-01-28 NOTE — PROCEDURES
Vascular & Interventional Radiology Brief Procedure Note    Interventional Radiologist: Miguel Angel Galvin MD    Pre-operative Diagnosis:  Left shoulder pain    Post-operative Diagnosis: Same as pre-op dx    Procedure(s) Performed:  L shoulder arthrogram    Anesthesia:  Local and Moderate Sedation    Findings:  Uncomplicated left shoulder arthrogram, please see full dictation to follow for details.      Complications: None    Estimated Blood Loss:  minimal    Tubes and Drains: None    Specimens: None    Condition: Good     Miguel Angel Galvin MD  [facility]  Vascular & Interventional Radiology  1/28/2019

## 2019-02-18 ENCOUNTER — OFFICE VISIT (OUTPATIENT)
Dept: FAMILY MEDICINE CLINIC | Age: 54
End: 2019-02-18

## 2019-02-18 VITALS
WEIGHT: 189.2 LBS | BODY MASS INDEX: 32.3 KG/M2 | RESPIRATION RATE: 16 BRPM | HEART RATE: 84 BPM | DIASTOLIC BLOOD PRESSURE: 86 MMHG | SYSTOLIC BLOOD PRESSURE: 138 MMHG | HEIGHT: 64 IN | TEMPERATURE: 98.2 F

## 2019-02-18 DIAGNOSIS — I10 ESSENTIAL HYPERTENSION: Primary | ICD-10-CM

## 2019-02-18 DIAGNOSIS — R20.2 PARESTHESIA: ICD-10-CM

## 2019-02-18 DIAGNOSIS — E78.2 MIXED HYPERLIPIDEMIA: ICD-10-CM

## 2019-02-18 DIAGNOSIS — R73.01 IMPAIRED FASTING BLOOD SUGAR: ICD-10-CM

## 2019-02-18 DIAGNOSIS — M25.512 CHRONIC LEFT SHOULDER PAIN: ICD-10-CM

## 2019-02-18 DIAGNOSIS — G89.29 OTHER CHRONIC PAIN: ICD-10-CM

## 2019-02-18 DIAGNOSIS — D50.8 OTHER IRON DEFICIENCY ANEMIA: ICD-10-CM

## 2019-02-18 DIAGNOSIS — E55.9 VITAMIN D DEFICIENCY: ICD-10-CM

## 2019-02-18 DIAGNOSIS — G89.29 CHRONIC LEFT SHOULDER PAIN: ICD-10-CM

## 2019-02-18 DIAGNOSIS — F33.2 SEVERE EPISODE OF RECURRENT MAJOR DEPRESSIVE DISORDER, WITHOUT PSYCHOTIC FEATURES (HCC): ICD-10-CM

## 2019-02-18 RX ORDER — CYCLOBENZAPRINE HCL 5 MG
5 TABLET ORAL
Qty: 30 TAB | Refills: 1 | Status: SHIPPED | OUTPATIENT
Start: 2019-02-18 | End: 2019-04-25 | Stop reason: SDUPTHER

## 2019-02-18 NOTE — PROGRESS NOTES
Chief Complaint   Patient presents with    Follow-up     3 week    Depression    Hypertension       Health Maintenance Due   Topic Date Due    PAP AKA CERVICAL CYTOLOGY  02/01/1987    Shingrix Vaccine Age 50> (1 of 2) 02/01/2016    BREAST CANCER SCRN MAMMOGRAM  02/01/2016    FOBT Q 1 YEAR AGE 50-75  02/01/2016       Health Maintenance reviewed     1. Have you been to the ER, urgent care clinic since your last visit? Hospitalized since your last visit? No    2. Have you seen or consulted any other health care providers outside of the [address] [place] [address] since your last visit? Include any pap smears or colon screening.  No

## 2019-02-18 NOTE — PROGRESS NOTES
[address], [place], [address]  Phone [phone]; Fax [phone]      Phan Lopez is here for follow up of HTN and depression and to review MRI report of the L shoulder with MR arthrogram    MR arthrogram of shoulder did not show any rotator cuff tear but showed some postsurgical changes from prior rotator cuff surgery and biceps tendinosis - will need to f/u with ortho for her L shoulder pain and decreased ROM. At last visit her BP was well controlled so told to continue Amlodipine at 10mg daily. Home Bps: Not checking. She took amlodipine 10mg dose last night. Also here to check labwork - she is taking iron supplement and has long-standing iron-def anemia related to fibroids with recent normal colonoscopy 1 year ago and here to check lipids, vit D, CBC, CMP, iron panel - she is not fasting so will come back later this week for bloodwork. She is taking iron supplements once a day. Also she had not felt much improvement on Lexapro 10mg daily at last visit so it was increased to 20mg daily and she was advised to see psych. She states she lost psych ref so has not scheduled appt yet. She notices improvement on Lexapro 20mg daily. Denies SI/HI. Mood has improved. She last saw ortho first week in February. She went for 2nd opinion from ortho. He stopped  Her pain meds and PT. He told her he wanted to f/u after the MRI to discuss testing for possible infection related to prior surgery. She still has decreased ROM of the L arm and pain and tingling  Down arm. Surgical History  Past Surgical History:   Procedure Laterality Date    HX SHOULDER ARTHROSCOPY Left 2017 and oct 2018        Medications  Current Outpatient Medications   Medication Sig Dispense Refill    docusate sodium (COLACE) 100 mg capsule Take 1 Cap by mouth two (2) times a day for 90 days.  60 Cap 2    escitalopram oxalate (LEXAPRO) 20 mg tablet Take 1 Tab by mouth daily. 90 Tab 3    MULTIVITAMIN-IRON-FOLIC ACID PO Take  by Mouth.  diclofenac EC (VOLTAREN) 75 mg EC tablet TAKE 1 TABLET BY MOUTH TWICE A DAY  1    ergocalciferol (ERGOCALCIFEROL) 50,000 unit capsule Take 1 Cap by mouth every seven (7) days for 84 days. 4 Cap 2    amLODIPine (NORVASC) 10 mg tablet Take 1 Tab by mouth daily.  90 Tab 3    oxyCODONE IR (ROXICODONE) 5 mg immediate release tablet TAKE 1 TABLET BY MOUTH EVERY 6 HOURS AS NEEDED FOR PAIN  0       Allergies  Allergies   Allergen Reactions    Prednisone Other (comments)     Coughs up blood         Family History  Family History   Problem Relation Age of Onset    Diabetes Mother     Heart Attack Mother     Diabetes Father     Lupus Sister     Depression Other     Depression Paternal Uncle        Social History  Social History     Socioeconomic History    Marital status: UNKNOWN     Spouse name: Not on file    Number of children: Not on file    Years of education: Not on file    Highest education level: Not on file   Social Needs    Financial resource strain: Not on file    Food insecurity - worry: Not on file    Food insecurity - inability: Not on file   [employer] needs - medical: Not on file   [employer] needs - non-medical: Not on file   Occupational History    Not on file   Tobacco Use    Smoking status: Never Smoker    Smokeless tobacco: Never Used   Substance and Sexual Activity    Alcohol use: No     Frequency: Never    Drug use: No    Sexual activity: Yes     Partners: Male   Other Topics Concern    Not on file   Social History Narrative    Not on file       Problem List  Patient Active Problem List   Diagnosis Code    Moderate major depression (HCC) F32.1    Depression F32.9    HTN (hypertension) I10    Mixed hyperlipidemia E78.2    Uterine leiomyoma D25.9    Vitamin D deficiency E55.9    GRETCHEN (iron deficiency anemia) D50.9       Review of Systems  ROS   Review of Systems   Constitutional: Negative. HENT: Negative. Respiratory: Negative. Cardiovascular: Negative. All other systems reviewed and are negative. Vital Signs  Vitals:    02/18/19 1125   BP: 149/90   Pulse: 84   Resp: 16   Temp: 98.2 °F (36.8 °C)   TempSrc: Oral   Weight: 189 lb 3.2 oz (85.8 kg)   Height: 5' 4\" (1.626 m)   PainSc:   6   PainLoc: Shoulder   LMP: 01/22/2019     RPT /86    Physical Exam  Physical Exam   General:   Alert,  in no acute distress. HEENT:  NC/AT, Anicteric, MMM, OP clear, NL nares, TMs pearly gray B/L, Neck supple. No cervical LAD. No carotid bruits B/L  Cardiovasc:   RRR, no MRG  Pulmonary:   Lungs clear bilaterally. Normal respiratory effort. No wheezing, ronchi or rales, equal BS B/L, Good air entry. Abdomen:   Abdomen soft, NT, ND, NAB. Extremities:   No edema, no TTP bilateral calves. LEs warm and well-perfused. Neuro:   Alert and oriented, no focal deficits. Psych:  Normal mood, not tearful. Affect improving - not flattened          Results  Results for orders placed or performed during the hospital encounter of 17/22/22   METABOLIC PANEL, COMPREHENSIVE   Result Value Ref Range    Sodium 141 136 - 145 mmol/L    Potassium 3.7 3.5 - 5.5 mmol/L    Chloride 107 100 - 108 mmol/L    CO2 27 21 - 32 mmol/L    Anion gap 7 3.0 - 18 mmol/L    Glucose 103 (H) 74 - 99 mg/dL    BUN 10 7.0 - 18 MG/DL    Creatinine 0.83 0.6 - 1.3 MG/DL    BUN/Creatinine ratio 12 12 - 20      GFR est AA >60 >60 ml/min/1.73m2    GFR est non-AA >60 >60 ml/min/1.73m2    Calcium 8.7 8.5 - 10.1 MG/DL    Bilirubin, total 0.5 0.2 - 1.0 MG/DL    ALT (SGPT) 40 13 - 56 U/L    AST (SGOT) 34 15 - 37 U/L    Alk.  phosphatase 73 45 - 117 U/L    Protein, total 7.1 6.4 - 8.2 g/dL    Albumin 3.6 3.4 - 5.0 g/dL    Globulin 3.5 2.0 - 4.0 g/dL    A-G Ratio 1.0 0.8 - 1.7     CBC WITH AUTOMATED DIFF   Result Value Ref Range    WBC 4.5 (L) 4.6 - 13.2 K/uL    RBC 4.46 4.20 - 5.30 M/uL    HGB 11.7 (L) 12.0 - 16.0 g/dL    HCT 33.5 (L) 35.0 - 45.0 %    MCV 75.1 74.0 - 97.0 FL    MCH 26.2 24.0 - 34.0 PG    MCHC 34.9 31.0 - 37.0 g/dL    RDW 15.7 (H) 11.6 - 14.5 %    PLATELET 121 266 - 060 K/uL    MPV 10.9 9.2 - 11.8 FL    NEUTROPHILS 42 40 - 73 %    LYMPHOCYTES 45 21 - 52 %    MONOCYTES 10 3 - 10 %    EOSINOPHILS 2 0 - 5 %    BASOPHILS 1 0 - 2 %    ABS. NEUTROPHILS 1.9 1.8 - 8.0 K/UL    ABS. LYMPHOCYTES 2.1 0.9 - 3.6 K/UL    ABS. MONOCYTES 0.4 0.05 - 1.2 K/UL    ABS. EOSINOPHILS 0.1 0.0 - 0.4 K/UL    ABS. BASOPHILS 0.0 0.0 - 0.1 K/UL    DF AUTOMATED     IRON PROFILE   Result Value Ref Range    Iron 53 50 - 175 ug/dL    TIBC 392 250 - 450 ug/dL    Iron % saturation 14 %       Result Notes for MRI SHOULDER LT W CONT     Notes recorded by Ksenia Martinez MD on 1/28/2019 at 2:09 PM EST  Reviewed, letter sent - will discuss in more detail at 2/12/19 appt          Study Result     EXAM: MRI SHOULDER LT W CONT     CLINICAL INDICATION/HISTORY: 46-year-old patient with left shoulder pain and  limited range of motion. Prior left shoulder arthroscopies.     COMPARISON: Correlation is made to injection imaging from same day arthrogram.     TECHNIQUE: MRI examination of the left shoulder performed with a local coil. Axial PD images with and without fat suppression; oblique coronal T1 and T2  fat-suppressed images; as well as oblique sagittal PD and T2 fat-suppressed  images obtained. Anatomic detail of this exam is limited by the presence of  motion artifact.     _______________     FINDINGS:     ROTATOR CUFF: Evaluation of the rotator cuff is limited by the presence of  motion artifact. On both fluid sensitive and postcontrast imaging, no discrete  rotator cuff tendon tear is demonstrated. Subscapularis tendon appears intact. The rotator cuff muscle bulk is normal. No fatty atrophy or fatty infiltration.   Similarly, no evidence of abnormal intramuscular signal is demonstrated to  suggest sequela of subacute denervation.     LABRUM: The labrum as visualized appears intact, with detail limited by the  degree of motion artifact.     BICEPS TENDON COMPLEX: Evidence of prior biceps tenodesis is noted with a  truncated appearance to the intra-articular portion of the long head biceps  tendon. Solitary suture anchor noted within the proximal portion of the  intertubercular sulcus.     CORACOACROMIAL ARCH: There are mild degenerative changes of the  acromioclavicular joint, to include marginal proliferative changes, small joint  effusion, and mild pericapsular edema. Likely prior acromioplasty with residual  type I acromial morphology.     CAPSULE/LIGAMENTS: The superior, middle, and inferior glenohumeral ligaments are  unremarkable. Coracohumeral and coracoacromial ligaments are unremarkable as  well. No MR findings of adhesive capsulitis.     BONES/JOINT SPACE: No significant degenerative osteoarthropathy or  chondromalacia of the glenohumeral joint. There is no evidence for fracture,  contusion, bony [place], [place] Sachs lesion.     PERIARTICULAR: There is a small quantity of T1 hyperintense fluid within the  subacromial-subdeltoid bursa.     _______________     IMPRESSION  IMPRESSION:     1. Motion limited examination of the left shoulder.     2. Within the limitations of motion artifact, no discrete rotator cuff tendon  tear is demonstrated. Although no discrete tendon tear is demonstrated,  arthrographic contrast is present within the subacromial-subdeltoid bursa. This  finding may reflect sequela of prior cuff repair, which can be a normal finding  relating to postoperative cuff permeability. Correlation with surgical history  recommended. Normal rotator cuff muscle bulk and signal.     3. Prior biceps tenodesis and likely acromioplasty with residual type I acromial  morphology. Assessment and Plan  1. Essential hypertension  BP is reasonably well controlled today  Continue Amlodipine 10mg daily  DASH diet discussed - handout given    2.  Severe episode of recurrent major depressive disorder, without psychotic features ([address]  Denies SI/HI  Mood improving on Lexapro 20mg daily  Continue Lexapro 20mg daily  Advised to return this week for fasting labwork - check TFTs  She did not schedule appt with psych yet bc she lost referral - reprinted ref today and stressed importance of scheduling appt with psych.     - TSH AND FREE T4; Future    3. Mixed hyperlipidemia  Advised to return this week for fasting labwork  Check fasting lipid panel    - LIPID PANEL; Future    4. Vitamin D deficiency  Check vit D level with labs this week    - METABOLIC PANEL, COMPREHENSIVE; Future  - VITAMIN D, 25 HYDROXY; Future    5. Other iron deficiency anemia  She Is taking iron supplements once a day  Check CBC and iron panel with labs as per lab orders    - CBC WITH AUTOMATED DIFF; Future  - IRON PROFILE; Future  - FERRITIN; Future    6. Impaired fasting blood sugar  Her last glucose was 103  Check A1c    - HEMOGLOBIN A1C W/O EAG; Future    7. Chronic left shoulder pain  She has chronic shoulder pain and decreased ROM after her prior rotator cuff repair surgery  Her pain is not improving  She states ortho took her off the roxicodone and stopped the PT  I reviewed the MRI/arthroscopy report in detail with patient today - it does not show any rotator cuff tear, just post-surgical changes and biceps tendonosis - I gave handout on Biceps tendonitis exercises and she will continue HEP recommended by PT and her orhto  Advised her to f/u with ortho as he was going to do further testing to r/o infection related to her prior surgery - no sign of Osteomyelitis or other infection on the MRI  She asks for pain medication - start Flexeril 5mg qhs, side effects including drowsiness/dizziness discussed, advised to take at bedtime, do not take if need to be alert or driving, do not take with alcohol or benadryl or other sedating meds.  Advised that if it does not cause drowsiness, then she may take up to TID PRN pain  Also, gave ref to pain management. Check ESR to r/o infection    - SED RATE (ESR); Future    8. Other chronic pain  Treat with cyclobenzaprine as above  Ref to pain management    - REFERRAL TO PAIN MANAGEMENT  - cyclobenzaprine (FLEXERIL) 5 mg tablet; Take 1 Tab by mouth three (3) times daily (with meals). Dispense: 30 Tab; Refill: 1    9. Paresthesia  She has some numbness and tingling down the L arm associated with the L shoulder decreased ROM and pain  Check B12 and folate  HEP, flexeril, return to ortho and pain management as above  Discussed MRI findings in detail with pt today    - VITAMIN B12 & FOLATE; Future    After care summary printed and reviewed with patient. Plan reviewed with patient. Questions answered. Patient verbalized understanding of plan and is in agreement with plan. Patient to follow up in one week or earlier if symptoms worsen. Return to work letter given. Encouraged the use of my chart. F/U 1 month    Veronica L. Dennie Dales, MD, [facility]  Internal Medicine/Pediatrics

## 2019-02-18 NOTE — PATIENT INSTRUCTIONS
Biceps Tendinitis: Exercises  Your Care Instructions  Here are some examples of typical rehabilitation exercises for your condition. Start each exercise slowly. Ease off the exercise if you start to have pain. Your doctor or physical therapist will tell you when you can start these exercises and which ones will work best for you. How to do the exercises  Biceps stretch    1. Stand and hold your affected arm out to the side, with your hand at about hip level. 2. Gently bend your wrist back so that your fingers point down toward the floor. 3. You may also do this next to a wall and rest your fingers on the wall. 4. For more of a stretch, bend your head to the opposite side of your affected arm. 5. Hold for 15 to 30 seconds. 6. Repeat 2 to 4 times. Resisted supination    1. Sit leaning forward with your legs slightly spread. Then place your forearm on your thigh with your hand and affected wrist in front of your knee. 2. Grasp one end of an exercise band with your palm down, and step on the other end. 3. Keeping your wrist straight, roll your palm outward and away from your thigh for a count of 2, then slowly move your wrist back to the starting position to a count of 5.  4. Repeat 8 to 12 times. Resisted elbow flexion    1. Using your affected arm, hold one end of an elastic band in your hand. 2. Place the other end of the band under your foot on the same side of your body as your affected arm. 3. Slowly bend your elbow and bring your hand toward your shoulder. Your palm and the underside of your wrist should be facing up as you pull the band toward your shoulder. Count to 2 as you pull up. 4. Relax and slowly return to your starting position. Count to 5 as you return to the start. 5. Repeat 8 to 12 times. Resisted elbow flexion at shoulder level    1. Tie the ends of an exercise band together to form a loop.  Attach one end of the loop to a secure object or shut a door on it to hold it in place. (Or you can have someone hold one end of the loop to provide resistance.) The band should be at shoulder level. 2. Stand facing where you have tied or fastened the band. 3. Start with your affected arm held out straight, holding the band in your hand. 4. Slowly bend your elbow to 90 degrees, bringing your hand toward your forehead. Count to 2 as you pull the band toward your head. 5. Relax and slowly return to your starting position. Count to 5 as you return to the start. 6. Repeat 8 to 12 times. Follow-up care is a key part of your treatment and safety. Be sure to make and go to all appointments, and call your doctor if you are having problems. It's also a good idea to know your test results and keep a list of the medicines you take. Where can you learn more? Go to http://liz-rianna.info/. Enter K469 in the search box to learn more about \"Biceps Tendinitis: Exercises. \"  Current as of: September 20, 2018  Content Version: 11.9  © 6178-8395 [employer]. Care instructions adapted under license by [employer] (which disclaims liability or warranty for this information). If you have questions about a medical condition or this instruction, always ask your healthcare professional. [address] any warranty or liability for your use of this information. Cyclobenzaprine (By mouth)   Cyclobenzaprine ([id number])  Treats pain and stiffness caused by muscle spasms. Brand Name(s): Amrix, Cyclo/Jazmín 10/300 Pack, CycloTENS Refill [place], CycloTENS Starter [place], Cyclobenzaprine Comfort Pac, CyclobenzaprinePax, Fexmid, FlexePax, FusePaq Tabradol, RapidPaq Tabradol   There may be other brand names for this medicine. When This Medicine Should Not Be Used: This medicine is not right for everyone. Do not use it if you had an allergic reaction to cyclobenzaprine.   How to Use This Medicine:   Long Acting Capsule, Liquid, Tablet  · Your doctor will tell you how much medicine to use. Do not use more than directed. · Take this medicine at the same time each day. · Swallow the extended-release capsule whole. Do not crush, break, or chew it. · If you cannot swallow the capsule whole, you may open the capsule and sprinkle the contents over one tablespoon of applesauce. Swallow the mixture right away without chewing. Rinse the mouth to make sure all of the medicine have been swallowed. Do not save any of the mixture to use later. · This medicine is not for long-term use. · Missed dose: Take a dose as soon as you remember. If it is almost time for your next dose, wait until then and take a regular dose. Do not take extra medicine to make up for a missed dose. · Store the medicine in a closed container at room temperature, away from heat, moisture, and direct light. Drugs and Foods to Avoid:   Ask your doctor or pharmacist before using any other medicine, including over-the-counter medicines, vitamins, and herbal products. · Do not use this medicine if you have used an MAO inhibitor (MAOI) within 14 days of each other. · Some foods and medicines can affect how this medicine works. Tell your doctor if you are using any of the following:   ¨ Bupropion, guanethidine, meperidine, tramadol, verapamil  ¨ Medicine to treat depression (including amitriptyline, imipramine)  · Do not drink alcohol while you are using this medicine. · Tell your doctor if you use anything else that makes you sleepy. Some examples are allergy medicine, narcotic pain medicine, and alcohol. Warnings While Using This Medicine:   · Tell your doctor if you are pregnant or breastfeeding, or if you have liver problems, congestive heart failure, heart rhythm problems, a recent heart attack, overactive thyroid, or a history of glaucoma or trouble urinating.   · This medicine may cause the following problems:  ¨ Serotonin syndrome, when taken with certain medicines  · This medicine may make you dizzy or drowsy. Do not drive or doing anything that could be dangerous until you know how this medicine affects you. · Call your doctor if your symptoms do not improve or if they get worse. · Keep all medicine out of the reach of children. Never share your medicine with anyone. Possible Side Effects While Using This Medicine:   Call your doctor right away if you notice any of these side effects:  · Allergic reaction: Itching or hives, swelling in your face or hands, swelling or tingling in your mouth or throat, chest tightness, trouble breathing  · Anxiety, restlessness, fever, sweating, twitching, nausea, vomiting, diarrhea, seeing or hearing things that are not there  · Fast, pounding, or uneven heartbeat  · Severe drowsiness, fainting, or confusion  If you notice these less serious side effects, talk with your doctor:   · Dizziness  · Dry mouth  If you notice other side effects that you think are caused by this medicine, tell your doctor. Call your doctor for medical advice about side effects. You may report side effects to FDA at 3-978-FDA-9126  © 2017 [facility] Information is for End User's use only and may not be sold, redistributed or otherwise used for commercial purposes. The above information is an  only. It is not intended as medical advice for individual conditions or treatments. Talk to your doctor, nurse or pharmacist before following any medical regimen to see if it is safe and effective for  you. DASH Diet: Care Instructions  Your Care Instructions    The DASH diet is an eating plan that can help lower your blood pressure. DASH stands for Dietary Approaches to Stop Hypertension. Hypertension is high blood pressure. The DASH diet focuses on eating foods that are high in calcium, potassium, and magnesium. These nutrients can lower blood pressure.  The foods that are highest in these nutrients are fruits, vegetables, low-fat dairy products, nuts, seeds, and legumes. But taking calcium, potassium, and magnesium supplements instead of eating foods that are high in those nutrients does not have the same effect. The DASH diet also includes whole grains, fish, and poultry. The DASH diet is one of several lifestyle changes your doctor may recommend to lower your high blood pressure. Your doctor may also want you to decrease the amount of sodium in your diet. Lowering sodium while following the DASH diet can lower blood pressure even further than just the DASH diet alone. Follow-up care is a key part of your treatment and safety. Be sure to make and go to all appointments, and call your doctor if you are having problems. It's also a good idea to know your test results and keep a list of the medicines you take. How can you care for yourself at home? Following the DASH diet  Eat 4 to 5 servings of fruit each day. A serving is 1 medium-sized piece of fruit, ½ cup chopped or canned fruit, 1/4 cup dried fruit, or 4 ounces (½ cup) of fruit juice. Choose fruit more often than fruit juice. Eat 4 to 5 servings of vegetables each day. A serving is 1 cup of lettuce or raw leafy vegetables, ½ cup of chopped or cooked vegetables, or 4 ounces (½ cup) of vegetable juice. Choose vegetables more often than vegetable juice. Get 2 to 3 servings of low-fat and fat-free dairy each day. A serving is 8 ounces of milk, 1 cup of yogurt, or 1 ½ ounces of cheese. Eat 6 to 8 servings of grains each day. A serving is 1 slice of bread, 1 ounce of dry cereal, or ½ cup of cooked rice, pasta, or cooked cereal. Try to choose whole-grain products as much as possible. Limit lean meat, poultry, and fish to 2 servings each day. A serving is 3 ounces, about the size of a deck of cards. Eat 4 to 5 servings of nuts, seeds, and legumes (cooked dried beans, lentils, and split peas) each week. A serving is 1/3 cup of nuts, 2 tablespoons of seeds, or ½ cup of cooked beans or peas.   Limit fats and oils to 2 to 3 servings each day. A serving is 1 teaspoon of vegetable oil or 2 tablespoons of salad dressing. Limit sweets and added sugars to 5 servings or less a week. A serving is 1 tablespoon jelly or jam, ½ cup sorbet, or 1 cup of lemonade. Eat less than 2,300 milligrams (mg) of sodium a day. If you limit your sodium to 1,500 mg a day, you can lower your blood pressure even more. Tips for success  Start small. Do not try to make dramatic changes to your diet all at once. You might feel that you are missing out on your favorite foods and then be more likely to not follow the plan. Make small changes, and stick with them. Once those changes become habit, add a few more changes. Try some of the following:  Make it a goal to eat a fruit or vegetable at every meal and at snacks. This will make it easy to get the recommended amount of fruits and vegetables each day. Try yogurt topped with fruit and nuts for a snack or healthy dessert. Add lettuce, tomato, cucumber, and onion to sandwiches. Combine a ready-made pizza crust with low-fat mozzarella cheese and lots of vegetable toppings. Try using tomatoes, squash, spinach, broccoli, carrots, cauliflower, and onions. Have a variety of cut-up vegetables with a low-fat dip as an appetizer instead of chips and dip. Sprinkle sunflower seeds or chopped almonds over salads. Or try adding chopped walnuts or almonds to cooked vegetables. Try some vegetarian meals using beans and peas. Add garbanzo or kidney beans to salads. Make burritos and tacos with mashed scales beans or black beans. Where can you learn more? Go to http://liz-rianna.info/. Enter S010 in the search box to learn more about \"DASH Diet: Care Instructions. \"  Current as of: July 22, 2018  Content Version: 11.9  © 1800-9470 [employer]. Care instructions adapted under license by [employer] (which disclaims liability or warranty for this information).  If you have questions about a medical condition or this instruction, always ask your healthcare professional. [place] [address] any warranty or liability for your use of this information.

## 2019-04-08 DIAGNOSIS — G89.29 OTHER CHRONIC PAIN: ICD-10-CM

## 2019-04-08 RX ORDER — CYCLOBENZAPRINE HCL 5 MG
5 TABLET ORAL
Qty: 30 TAB | Refills: 1 | OUTPATIENT
Start: 2019-04-08

## 2019-04-08 NOTE — TELEPHONE ENCOUNTER
PCP: Katia Anguiano NP    Last appt: 3/26/2019  No future appointments. Requested Prescriptions     Pending Prescriptions Disp Refills    cyclobenzaprine (FLEXERIL) 5 mg tablet 30 Tab 1     Sig: Take 1 Tab by mouth three (3) times daily (with meals).

## 2019-04-25 ENCOUNTER — OFFICE VISIT (OUTPATIENT)
Dept: FAMILY MEDICINE CLINIC | Age: 54
End: 2019-04-25

## 2019-04-25 VITALS
BODY MASS INDEX: 31.89 KG/M2 | HEART RATE: 84 BPM | OXYGEN SATURATION: 99 % | HEIGHT: 64 IN | SYSTOLIC BLOOD PRESSURE: 138 MMHG | RESPIRATION RATE: 20 BRPM | TEMPERATURE: 98.2 F | DIASTOLIC BLOOD PRESSURE: 92 MMHG | WEIGHT: 186.8 LBS

## 2019-04-25 DIAGNOSIS — M25.512 CHRONIC LEFT SHOULDER PAIN: Primary | ICD-10-CM

## 2019-04-25 DIAGNOSIS — E55.9 VITAMIN D DEFICIENCY: ICD-10-CM

## 2019-04-25 DIAGNOSIS — G89.29 CHRONIC LEFT SHOULDER PAIN: Primary | ICD-10-CM

## 2019-04-25 DIAGNOSIS — I10 ESSENTIAL HYPERTENSION: ICD-10-CM

## 2019-04-25 DIAGNOSIS — Z12.11 ENCOUNTER FOR SCREENING COLONOSCOPY: ICD-10-CM

## 2019-04-25 DIAGNOSIS — F33.2 SEVERE EPISODE OF RECURRENT MAJOR DEPRESSIVE DISORDER, WITHOUT PSYCHOTIC FEATURES (HCC): ICD-10-CM

## 2019-04-25 RX ORDER — GABAPENTIN 300 MG/1
300 CAPSULE ORAL
COMMUNITY
Start: 2018-01-23 | End: 2019-08-14

## 2019-04-25 RX ORDER — MELOXICAM 15 MG/1
15 TABLET ORAL DAILY
COMMUNITY
End: 2019-08-14

## 2019-04-25 RX ORDER — CELECOXIB 200 MG/1
200 CAPSULE ORAL
COMMUNITY
Start: 2016-03-29 | End: 2020-09-04

## 2019-04-25 RX ORDER — ERGOCALCIFEROL 1.25 MG/1
CAPSULE ORAL
COMMUNITY
End: 2019-09-24 | Stop reason: ALTCHOICE

## 2019-04-25 RX ORDER — DULOXETIN HYDROCHLORIDE 30 MG/1
30 CAPSULE, DELAYED RELEASE ORAL DAILY
COMMUNITY
End: 2019-08-14 | Stop reason: DRUGHIGH

## 2019-04-25 RX ORDER — CYCLOBENZAPRINE HCL 5 MG
5 TABLET ORAL
Qty: 30 TAB | Refills: 0 | Status: SHIPPED | OUTPATIENT
Start: 2019-04-25 | End: 2020-02-27

## 2019-04-25 NOTE — PROGRESS NOTES
Jamila Coughlin presents today for   Chief Complaint   Patient presents with    Medication Refill       Is someone accompanying this pt? No    Is the patient using any DME equipment during OV? No    Depression Screening:  3 most recent PHQ Screens 4/25/2019   PHQ Not Done -   Little interest or pleasure in doing things Several days   Feeling down, depressed, irritable, or hopeless Nearly every day   Total Score PHQ 2 4   Trouble falling or staying asleep, or sleeping too much Nearly every day   Feeling tired or having little energy Several days   Poor appetite, weight loss, or overeating Nearly every day   Feeling bad about yourself - or that you are a failure or have let yourself or your family down Nearly every day   Trouble concentrating on things such as school, work, reading, or watching TV Several days   Moving or speaking so slowly that other people could have noticed; or the opposite being so fidgety that others notice More than half the days   Thoughts of being better off dead, or hurting yourself in some way Several days   PHQ 9 Score 18   How difficult have these problems made it for you to do your work, take care of your home and get along with others Extremely difficult       Learning Assessment:  Learning Assessment 1/3/2019   PRIMARY LEARNER Patient   HIGHEST LEVEL OF EDUCATION - PRIMARY LEARNER  GRADUATED HIGH SCHOOL OR GED   BARRIERS PRIMARY LEARNER NONE   CO-LEARNER CAREGIVER No   PRIMARY LANGUAGE ENGLISH   LEARNER PREFERENCE PRIMARY LISTENING   ANSWERED BY self   RELATIONSHIP SELF       Abuse Screening:  Abuse Screening Questionnaire 1/22/2019   Do you ever feel afraid of your partner? N   Are you in a relationship with someone who physically or mentally threatens you? N   Is it safe for you to go home? Y       Fall Screening:  Fall Risk Assessment, last 12 mths 1/22/2019   Able to walk? Yes   Fall in past 12 months?  No         Health Maintenance Due   Topic Date Due    PAP AKA CERVICAL CYTOLOGY 02/01/1987    Shingrix Vaccine Age 50> (1 of 2) 02/01/2016    BREAST CANCER SCRN MAMMOGRAM  02/01/2016    FOBT Q 1 YEAR AGE 50-75  02/01/2016   . Health Maintenance reviewed and discussed and ordered per Provider. Coordination of Care:  1. Have you been to the ER, urgent care clinic since your last visit? Hospitalized since your last visit? No    2. Have you seen or consulted any other health care providers outside of the [address] [place] [address] since your last visit? Include any pap smears or colon screening. No        Advance Directive:  1. Do you have an advance directive in place? Patient Reply:No    2. If not, would you like material regarding how to put one in place? Patient Reply: No      Provider notified of depression screening. Patient denies of any thoughts of harming self.

## 2019-04-25 NOTE — PROGRESS NOTES
[facility]  Pt presents for follow up requesting refill on Flexeril. Has history of chronic left shoulder pain. Has had two surgeries on her shoulder and was left with chronic pain. Says she went to PT and it didn't help. Rates it as a 6 and says it is throbbing. Says her mobility is limited and her life has been negatively impacted by the pain. Says she used to be very active and not she can't be which has lead to deep depression. Just started with pain management two weeks ago and was prescribed pain cream which she has not received as of yet. Pt appears sad. Tearful during conversation. Describes depressed mood, little interest in doing things, isolating herself, poor sleep, weight gain. Admits to thoughts of hurting herself at times, or ODing on pills. Denies present thoughts of self harm. Says that she feels she can call someone or seek help before acting on thoughts of self harm. Says that she missed an appointment at [facility] and has not been able to get another appointment, and the depression makes it hard for her to get it together to call and get another appointment. Son is supportive and is trying to be helpful. Encouraged her to take walks, which she has been trying to do. Has been taking Lexapro and was recently started on Cymbalta by pain management two weeks ago    Hypertension- blood pressure elevated in office. Pt says that this is probably pain related. Says she takes Amlodipine. Notices that ankle swell    Did not get labs drawn that were ordered by Dr Galen Boyer    Verbalized that she tried to get mammogram in the past but it caused her pain in her shoulder so she couldn't complete it. Will make appt for Pap. Says she hasn't had colonoscopy and is agreeable to scheduling this. Past Medical History  No past medical history on file.     Surgical History  Past Surgical History:   Procedure Laterality Date    HX SHOULDER ARTHROSCOPY Left 2017 and oct 2018 Medications  Current Outpatient Medications   Medication Sig Dispense Refill    DULoxetine (CYMBALTA) 30 mg capsule Take 30 mg by mouth daily.  meloxicam (MOBIC) 15 mg tablet Take 15 mg by mouth daily.  celecoxib (CELEBREX) 200 mg capsule Take 200 mg by mouth.  gabapentin (NEURONTIN) 300 mg capsule Take 300 mg by mouth.  cyclobenzaprine (FLEXERIL) 5 mg tablet Take 1 Tab by mouth three (3) times daily (with meals). 30 Tab 1    escitalopram oxalate (LEXAPRO) 20 mg tablet Take 1 Tab by mouth daily. 90 Tab 3    MULTIVITAMIN-IRON-FOLIC ACID PO Take  by Mouth.  amLODIPine (NORVASC) 10 mg tablet Take 1 Tab by mouth daily.  90 Tab 3    ergocalciferol (ERGOCALCIFEROL) 50,000 unit capsule ergocalciferol (vitamin D2) 50,000 unit capsule         Allergies  Allergies   Allergen Reactions    Prednisone Other (comments)     Coughs up blood         Family History  Family History   Problem Relation Age of Onset    Diabetes Mother     Heart Attack Mother     Diabetes Father     Lupus Sister     Depression Other     Depression Paternal Uncle        Social History  Social History     Socioeconomic History    Marital status: UNKNOWN     Spouse name: Not on file    Number of children: Not on file    Years of education: Not on file    Highest education level: Not on file   Occupational History    Not on file   Social Needs    Financial resource strain: Not on file    Food insecurity:     Worry: Not on file     Inability: Not on file    Transportation needs:     Medical: Not on file     Non-medical: Not on file   Tobacco Use    Smoking status: Never Smoker    Smokeless tobacco: Never Used   Substance and Sexual Activity    Alcohol use: No     Frequency: Never    Drug use: No    Sexual activity: Yes     Partners: Male   Lifestyle    Physical activity:     Days per week: Not on file     Minutes per session: Not on file    Stress: Not on file   Relationships    Social connections: Talks on phone: Not on file     Gets together: Not on file     Attends Quaker service: Not on file     Active member of club or organization: Not on file     Attends meetings of clubs or organizations: Not on file     Relationship status: Not on file    Intimate partner violence:     Fear of current or ex partner: Not on file     Emotionally abused: Not on file     Physically abused: Not on file     Forced sexual activity: Not on file   Other Topics Concern    Not on file   Social History Narrative    Not on file       Problem List  Patient Active Problem List   Diagnosis Code    Moderate major depression ([facility] [address] F32.1    Depression F32.9    HTN (hypertension) I10    Mixed hyperlipidemia E78.2    Uterine leiomyoma D25.9    Vitamin D deficiency E55.9    GRETCHEN (iron deficiency anemia) D50.9    Encounter for screening colonoscopy Z12.11    Other chronic pain G89.29    Chronic left shoulder pain M25.512, G89.29       Review of Systems  ROS    Vital Signs  Vitals:    04/25/19 1124   BP: (!) 138/92   Pulse: 84   Resp: 20   Temp: 98.2 °F (36.8 °C)   TempSrc: Oral   SpO2: 99%   Weight: 186 lb 12.8 oz (84.7 kg)   Height: 5' 4\" (1.626 m)   PainSc:   6   PainLoc: Shoulder   LMP: 03/12/2019       Physical Exam  Physical Exam   Constitutional: She is oriented to person, place, and time. She appears well-developed and well-nourished. She appears distressed. Neck: Normal range of motion. Neck supple. Cardiovascular: Normal rate and regular rhythm. Pulmonary/Chest: Effort normal and breath sounds normal. No respiratory distress. Abdominal: Soft. Bowel sounds are normal.   Musculoskeletal:   Tenderness upon palpation of left shoulder region. Decreased ROM, slightly decreased  of right hand. Normal sensation of right arm/hand   Lymphadenopathy:     She has no cervical adenopathy. Neurological: She is alert and oriented to person, place, and time. Skin: Skin is warm and dry. Psychiatric:   Tearful. Depressed affect. Verbalizing thoughts of self harm in past but not presently. Verbalizing that she can seek help if she develops thoughts of wanting to hurt herself       Diagnostics  Orders Placed This Encounter    DULoxetine (CYMBALTA) 30 mg capsule     Sig: Take 30 mg by mouth daily.  meloxicam (MOBIC) 15 mg tablet     Sig: Take 15 mg by mouth daily.  celecoxib (CELEBREX) 200 mg capsule     Sig: Take 200 mg by mouth.  ergocalciferol (ERGOCALCIFEROL) 50,000 unit capsule     Sig: ergocalciferol (vitamin D2) 50,000 unit capsule    gabapentin (NEURONTIN) 300 mg capsule     Sig: Take 300 mg by mouth. Results  Results for orders placed or performed during the hospital encounter of 69/53/65   METABOLIC PANEL, COMPREHENSIVE   Result Value Ref Range    Sodium 141 136 - 145 mmol/L    Potassium 3.7 3.5 - 5.5 mmol/L    Chloride 107 100 - 108 mmol/L    CO2 27 21 - 32 mmol/L    Anion gap 7 3.0 - 18 mmol/L    Glucose 103 (H) 74 - 99 mg/dL    BUN 10 7.0 - 18 MG/DL    Creatinine 0.83 0.6 - 1.3 MG/DL    BUN/Creatinine ratio 12 12 - 20      GFR est AA >60 >60 ml/min/1.73m2    GFR est non-AA >60 >60 ml/min/1.73m2    Calcium 8.7 8.5 - 10.1 MG/DL    Bilirubin, total 0.5 0.2 - 1.0 MG/DL    ALT (SGPT) 40 13 - 56 U/L    AST (SGOT) 34 15 - 37 U/L    Alk. phosphatase 73 45 - 117 U/L    Protein, total 7.1 6.4 - 8.2 g/dL    Albumin 3.6 3.4 - 5.0 g/dL    Globulin 3.5 2.0 - 4.0 g/dL    A-G Ratio 1.0 0.8 - 1.7     CBC WITH AUTOMATED DIFF   Result Value Ref Range    WBC 4.5 (L) 4.6 - 13.2 K/uL    RBC 4.46 4.20 - 5.30 M/uL    HGB 11.7 (L) 12.0 - 16.0 g/dL    HCT 33.5 (L) 35.0 - 45.0 %    MCV 75.1 74.0 - 97.0 FL    MCH 26.2 24.0 - 34.0 PG    MCHC 34.9 31.0 - 37.0 g/dL    RDW 15.7 (H) 11.6 - 14.5 %    PLATELET 091 484 - 816 K/uL    MPV 10.9 9.2 - 11.8 FL    NEUTROPHILS 42 40 - 73 %    LYMPHOCYTES 45 21 - 52 %    MONOCYTES 10 3 - 10 %    EOSINOPHILS 2 0 - 5 %    BASOPHILS 1 0 - 2 %    ABS. NEUTROPHILS 1.9 1.8 - 8.0 K/UL    ABS. LYMPHOCYTES 2.1 0.9 - 3.6 K/UL    ABS. MONOCYTES 0.4 0.05 - 1.2 K/UL    ABS. EOSINOPHILS 0.1 0.0 - 0.4 K/UL    ABS. BASOPHILS 0.0 0.0 - 0.1 K/UL    DF AUTOMATED     IRON PROFILE   Result Value Ref Range    Iron 53 50 - 175 ug/dL    TIBC 392 250 - 450 ug/dL    Iron % saturation 14 %       Assessment and Plan  Diagnoses and all orders for this visit:    1. Chronic left shoulder pain  Refilled Flexeril. Discussed that this may make her sleepy so not to take it and drive. Encouraged her to continue exercises and stretching, alternate heat and ice. Follow up with pain management as scheduled. Encouraged her to check with them before taking any kind of OTC med such as hemp oil  2. Essential hypertension  Encouraged pt to have lab work drawn. Discussed that she should continue with Amlodipine for now but if she continues to have swelling in the legs, we can change it to another medication. Reviewed DASH diet. Encouraged exercise  3. Severe episode of recurrent major depressive disorder, without psychotic features ([address]  Presently denies thoughts of self harm. Pt was given appt for counseling at [facility] April 30. Discussed relaxation, self care, good sleep hygiene, healthy diet, continue to try to go for walks, seek out support from children and friends. Go to ER or seek assistance, if pt has thoughts of wanting to harm self. Continue to take Lexapro and Cymbalta as prescribed. 4. Encounter for screening colonoscopy    5. Vitamin D deficiency  Lab order in place  Other orders  -     cyclobenzaprine (FLEXERIL) 5 mg tablet; Take 1 Tab by mouth three (3) times daily as needed for Muscle Spasm(s). -     REFERRAL TO GASTROENTEROLOGY  Pt to go get lab work drawn      After care summary printed and reviewed with patient. Plan reviewed with patient. Questions answered. Patient verbalized understanding of plan and is in agreement with plan.  Patient to follow up in one month or earlier if symptoms worsen. Encouraged the use of my chart.     Dianna Rivas, FNP-C

## 2019-07-16 ENCOUNTER — PATIENT OUTREACH (OUTPATIENT)
Dept: FAMILY MEDICINE CLINIC | Age: 54
End: 2019-07-16

## 2019-07-16 NOTE — PROGRESS NOTES
[facility] health screening:    Located 9/17/17 pap smear in care everywhere and have asked office staff to please print and scan into HM.

## 2019-07-18 ENCOUNTER — PATIENT OUTREACH (OUTPATIENT)
Dept: FAMILY MEDICINE CLINIC | Age: 54
End: 2019-07-18

## 2019-07-19 ENCOUNTER — PATIENT OUTREACH (OUTPATIENT)
Dept: FAMILY MEDICINE CLINIC | Age: 54
End: 2019-07-19

## 2019-07-19 NOTE — PROGRESS NOTES
health screening;    [facility] has patient scheduled for consultation July 26th and [facility] colonoscopy procedure for August 13th. I've attempted several times to contact patient to assist with any barriers she may have with this completion and mammogram completion but she has not returned my calls. I will continue to follow to ensure procedure report is updated in  upon its completion.

## 2019-08-14 ENCOUNTER — OFFICE VISIT (OUTPATIENT)
Dept: FAMILY MEDICINE CLINIC | Age: 54
End: 2019-08-14

## 2019-08-14 VITALS
RESPIRATION RATE: 20 BRPM | SYSTOLIC BLOOD PRESSURE: 127 MMHG | DIASTOLIC BLOOD PRESSURE: 86 MMHG | BODY MASS INDEX: 31.65 KG/M2 | HEIGHT: 64 IN | OXYGEN SATURATION: 98 % | TEMPERATURE: 98.4 F | HEART RATE: 95 BPM | WEIGHT: 185.4 LBS

## 2019-08-14 DIAGNOSIS — R07.89 OTHER CHEST PAIN: ICD-10-CM

## 2019-08-14 DIAGNOSIS — G89.29 CHRONIC LEFT SHOULDER PAIN: ICD-10-CM

## 2019-08-14 DIAGNOSIS — F32.1 MODERATE MAJOR DEPRESSION (HCC): Primary | ICD-10-CM

## 2019-08-14 DIAGNOSIS — M25.512 CHRONIC LEFT SHOULDER PAIN: ICD-10-CM

## 2019-08-14 DIAGNOSIS — I10 ESSENTIAL HYPERTENSION: ICD-10-CM

## 2019-08-14 RX ORDER — LIDOCAINE 50 MG/G
OINTMENT TOPICAL
COMMUNITY
End: 2019-09-24 | Stop reason: ALTCHOICE

## 2019-08-14 RX ORDER — IBUPROFEN 800 MG/1
TABLET ORAL
COMMUNITY
End: 2019-08-14

## 2019-08-14 RX ORDER — ESCITALOPRAM OXALATE 20 MG/1
20 TABLET ORAL DAILY
Qty: 90 TAB | Refills: 1 | Status: CANCELLED | OUTPATIENT
Start: 2019-08-14

## 2019-08-14 RX ORDER — GABAPENTIN 300 MG/1
300 CAPSULE ORAL DAILY
Qty: 90 CAP | Refills: 1 | Status: CANCELLED | OUTPATIENT
Start: 2019-08-14

## 2019-08-14 RX ORDER — ERGOCALCIFEROL 1.25 MG/1
CAPSULE ORAL
Qty: 12 CAP | Refills: 1 | Status: CANCELLED | OUTPATIENT
Start: 2019-08-14

## 2019-08-14 RX ORDER — DULOXETIN HYDROCHLORIDE 60 MG/1
CAPSULE, DELAYED RELEASE ORAL
COMMUNITY
End: 2022-05-05 | Stop reason: SDDI

## 2019-08-14 NOTE — PROGRESS NOTES
HPI  Pt presents for follow up appt. Being followed by pain management for left shoulder pain. Still having a lot of pain. Says that she needs to get more medication than what she is getting now. Next appt is August 19th. Also per ortho, supposed to have MRI repeated on shoulder. Had to reschedule but plans to get this done. Asking about getting a refill on gabapentin    Continues to be very depressed. Does have thoughts of self harm at times but says that she can reach out to her children before acting on these thoughts. Had been referred to therapist at last appointment. Said she went once but didn't like her. Quentin Aguilar that her daughter is helping her find another therapist.  Elkin Escobar. Says that she can't sleep at night. Also recently lost her father to an aneurysm which adds to her depression    Has not labs from February drawn yet. Ate oatmeal this morning but is willing to go to [facility] to have this done    Asking about swelling in bilateral ankles    Random pain in chest over the past few months. Doesn't radiate. Denies pain presently. May be related to anxiety. Has never been to cardiology    Says she can't get mammogram because of shoulder pain. Discussed that we can  revisit this after pain management gets this pain back under better control, but was encouraged to get mammogram as soon as possible    Has appt for colonoscopy      Past Medical History  No past medical history on file. Surgical History  Past Surgical History:   Procedure Laterality Date    HX SHOULDER ARTHROSCOPY Left 2017 and oct 2018        Medications  Current Outpatient Medications   Medication Sig Dispense Refill    DULoxetine (CYMBALTA) 60 mg capsule duloxetine 60 mg capsule,delayed release   TAKE 1 CAPSULE(S) EVERY DAY BY ORAL ROUTE FOR 15 DAYS.  celecoxib (CELEBREX) 200 mg capsule Take 200 mg by mouth.       cyclobenzaprine (FLEXERIL) 5 mg tablet Take 1 Tab by mouth three (3) times daily as needed for Muscle Spasm(s). 30 Tab 0    escitalopram oxalate (LEXAPRO) 20 mg tablet Take 1 Tab by mouth daily. 90 Tab 3    amLODIPine (NORVASC) 10 mg tablet Take 1 Tab by mouth daily.  90 Tab 3    lidocaine (XYLOCAINE) 5 % ointment lidocaine 5 % topical ointment      ergocalciferol (ERGOCALCIFEROL) 50,000 unit capsule ergocalciferol (vitamin D2) 50,000 unit capsule         Allergies  Allergies   Allergen Reactions    Prednisone Other (comments)     Coughs up blood         Family History  Family History   Problem Relation Age of Onset    Diabetes Mother     Heart Attack Mother     Diabetes Father     Lupus Sister     Depression Other     Depression Paternal Uncle        Social History  Social History     Socioeconomic History    Marital status: UNKNOWN     Spouse name: Not on file    Number of children: Not on file    Years of education: Not on file    Highest education level: Not on file   Occupational History    Not on file   Social Needs    Financial resource strain: Not on file    Food insecurity:     Worry: Not on file     Inability: Not on file    Transportation needs:     Medical: Not on file     Non-medical: Not on file   Tobacco Use    Smoking status: Never Smoker    Smokeless tobacco: Never Used   Substance and Sexual Activity    Alcohol use: No     Frequency: Never    Drug use: No    Sexual activity: Yes     Partners: Male   Lifestyle    Physical activity:     Days per week: Not on file     Minutes per session: Not on file    Stress: Not on file   Relationships    Social connections:     Talks on phone: Not on file     Gets together: Not on file     Attends Hinduism service: Not on file     Active member of club or organization: Not on file     Attends meetings of clubs or organizations: Not on file     Relationship status: Not on file    Intimate partner violence:     Fear of current or ex partner: Not on file     Emotionally abused: Not on file     Physically abused: Not on file     Forced sexual activity: Not on file   Other Topics Concern    Not on file   Social History Narrative    Not on file       Problem List  Patient Active Problem List   Diagnosis Code    Moderate major depression ([facility] [address] F32.1    Depression F32.9    HTN (hypertension) I10    Mixed hyperlipidemia E78.2    Uterine leiomyoma D25.9    Vitamin D deficiency E55.9    GRETCHEN (iron deficiency anemia) D50.9    Encounter for screening colonoscopy Z12.11    Other chronic pain G89.29    Chronic left shoulder pain M25.512, G89.29    Other chest pain R07.89       Review of Systems  Review of Systems   Cardiovascular: Positive for chest pain. Pain in center of chest. Doesn't radiate down her arm. random   Neurological: Positive for headaches. Psychiatric/Behavioral: Positive for depression and suicidal ideas. The patient is nervous/anxious and has insomnia. Fleeting thoughts of self harm but says she can reach out to her kids before acting on thoughts       Vital Signs  Vitals:    08/14/19 1109   BP: 127/86   Pulse: 95   Resp: 20   Temp: 98.4 °F (36.9 °C)   TempSrc: Oral   SpO2: 98%   Weight: 185 lb 6.4 oz (84.1 kg)   Height: 5' 4\" (1.626 m)   PainSc:   8   PainLoc: Generalized       Physical Exam  Physical Exam   Constitutional: She is oriented to person, place, and time. She appears well-developed and well-nourished. Appears depressed   Cardiovascular: Normal rate, regular rhythm and normal heart sounds. Pulmonary/Chest: Effort normal and breath sounds normal.   Musculoskeletal:   Left shoulder painful, decreased ROM   Neurological: She is alert and oriented to person, place, and time. Skin: Skin is warm and dry. Psychiatric: She has a normal mood and affect. Her behavior is normal. Thought content normal.   Nursing note reviewed.       Diagnostics  Orders Placed This Encounter    Jonnathan Linear ref [facility]     Referral Priority:   Routine     Referral Type:   Consultation     Referral Reason:   Specialty Services Required     Referred to Provider:   Antelmo Pearson DO     Number of Visits Requested:   1    DISCONTD: ibuprofen (MOTRIN) 800 mg tablet     Sig: ibuprofen 800 mg tablet   TAKE 1 TABLET BY MOUTH EVERY 8 HOURS FOR 14 DAYS    lidocaine (XYLOCAINE) 5 % ointment     Sig: lidocaine 5 % topical ointment    DULoxetine (CYMBALTA) 60 mg capsule     Sig: duloxetine 60 mg capsule,delayed release   TAKE 1 CAPSULE(S) EVERY DAY BY ORAL ROUTE FOR 15 DAYS. Results  Results for orders placed or performed in visit on 07/16/19    PAP SMEAR   Result Value Ref Range    Pap Smear, External Pap          Assessment and Plan  Diagnoses and all orders for this visit:    1. Moderate major depression ([address]  Patient verbalizing that she is very depressed and has fleeting thoughts of self-harm but says that she can  reach out to her children before acting on her thoughts. Reports she would go to the ER if she was feeling very bad. Prescribed Cymbalta by pain management and recently had this increased. Says that she has been taking her Lexapro but did not realize that she had a refill so she has not had it in a couple of weeks. Declined offer to assist with making an appointment to a different therapist as she did not like the last one that she met. Patient verbalized that her daughter is helping her therapist.  Roque Butlero her to reach out to this office if she is not able to find somebody and we will gladly help her get an appointment. She verbalized understanding of this. Reviewed importance of following up with psych for talk therapy as well as medication management. Also reviewed the importance of self-care, relaxation, good sleep hygiene. Patient to continue taking Cymbalta and Lexapro as ordered. Encouraged her to follow-up if she has any issues getting the Lexapro refill. 2. Essential hypertension  Discussed with patient that lower leg edema may be related to  The amlodipine.   Patient to get lab work drawn today.  Discussed with her that we would review labs and possibly change to a different agent at her next visit if she is still having swelling in her legs. 3. Chronic left shoulder pain   Pain management as scheduled. Discussed with patient that it did not appear that Gabapentin had been ordered  at this office. Encouraged her to discuss this option with pain management at her next visit. Also encouraged her to have the MRI done in follow-up with orthopedics. 4. Other chest pain  -     REFERRAL TO CARDIOLOGY  Discussed with patient that while chest pain may be related to anxiety it is also important to get her lab work drawn and will have her follow-up with cardiology. After care summary printed and reviewed with patient. Plan reviewed with patient. Questions answered. Patient verbalized understanding of plan and is in agreement with plan. Patient to follow up in 3 months or earlier if symptoms worsen. Encouraged the use of my chart.     MAGI Cardenas

## 2019-08-14 NOTE — PROGRESS NOTES
Mark Guerrero presents today for   Chief Complaint   Patient presents with    Hypertension    Depression      24       Mark Guerrero preferred language for health care discussion is english/other. Is someone accompanying this pt? no    Is the patient using any DME equipment during Susi Mayor? no    Depression Screening:  3 most recent PHQ Screens 8/14/2019   PHQ Not Done -   Little interest or pleasure in doing things Nearly every day   Feeling down, depressed, irritable, or hopeless Nearly every day   Total Score PHQ 2 6   Trouble falling or staying asleep, or sleeping too much Nearly every day   Feeling tired or having little energy Nearly every day   Poor appetite, weight loss, or overeating Nearly every day   Feeling bad about yourself - or that you are a failure or have let yourself or your family down More than half the days   Trouble concentrating on things such as school, work, reading, or watching TV Nearly every day   Moving or speaking so slowly that other people could have noticed; or the opposite being so fidgety that others notice More than half the days   Thoughts of being better off dead, or hurting yourself in some way More than half the days   PHQ 9 Score 24   How difficult have these problems made it for you to do your work, take care of your home and get along with others Somewhat difficult       Learning Assessment:  Learning Assessment 1/3/2019   PRIMARY LEARNER Patient   HIGHEST LEVEL OF EDUCATION - PRIMARY LEARNER  GRADUATED HIGH SCHOOL OR GED   BARRIERS PRIMARY LEARNER NONE   CO-LEARNER CAREGIVER No   PRIMARY LANGUAGE ENGLISH   LEARNER PREFERENCE PRIMARY LISTENING   ANSWERED BY self   RELATIONSHIP SELF       Abuse Screening:  Abuse Screening Questionnaire 1/22/2019   Do you ever feel afraid of your partner? N   Are you in a relationship with someone who physically or mentally threatens you? N   Is it safe for you to go home? Y       Generalized Anxiety  No flowsheet data found.       Health Maintenance Due   Topic Date Due    Shingrix Vaccine Age 49> (1 of 2) 02/01/2016    BREAST CANCER SCRN MAMMOGRAM  02/01/2016    FOBT Q 1 YEAR AGE 50-75  02/01/2016    Influenza Age 5 to Adult  08/01/2019   . Health Maintenance reviewed and discussed and ordered per Provider. Coordination of Care:  1. Have you been to the ER, urgent care clinic since your last visit? Hospitalized since your last visit? no    2. Have you seen or consulted any other health care providers outside of the [address] [place] [address] since your last visit? Include any pap smears or colon screening.  no      Advance Directive discussed 4/25/19

## 2019-09-04 ENCOUNTER — TELEPHONE (OUTPATIENT)
Dept: FAMILY MEDICINE CLINIC | Age: 54
End: 2019-09-04

## 2019-09-09 DIAGNOSIS — G89.29 CHRONIC LEFT SHOULDER PAIN: Primary | ICD-10-CM

## 2019-09-09 DIAGNOSIS — M25.512 CHRONIC LEFT SHOULDER PAIN: Primary | ICD-10-CM

## 2019-09-11 NOTE — TELEPHONE ENCOUNTER
Informed patient that a referral has been initiated for PT, their staff will call directly to schedule her appt

## 2019-09-17 ENCOUNTER — HOSPITAL ENCOUNTER (OUTPATIENT)
Dept: PHYSICAL THERAPY | Age: 54
Discharge: HOME | End: 2019-09-17
Payer: MEDICAID

## 2019-09-17 PROCEDURE — 97110 THERAPEUTIC EXERCISES: CPT

## 2019-09-17 PROCEDURE — 97161 PT EVAL LOW COMPLEX 20 MIN: CPT

## 2019-09-17 NOTE — PROGRESS NOTES
[facility] at [address] [place] [address] Drive: [phone]   Fax: [phone]  PLAN OF CARE / [address] [place] [address] PHYSICAL THERAPY SERVICES  Patient Name: Joe Oviedo : 1965   Medical   Diagnosis: Left shoulder pain [M25.512] Treatment Diagnosis: Chronic left shoulder pain   Onset Date:      Referral Source: Smitha Arcos NP Start of Care [facility]): 2019   Prior Hospitalization: See medical history Provider #: [id number]   Prior Level of Function: working full time with no pain or restrictions left shoulder   Comorbidities: HTN, depression   Medications: Verified on Patient Summary List   The Plan of Care and following information is based on the information from the initial evaluation.   ===========================================================================================  Assessment / santos information:  Joe Oviedo is a52 y.o left dominant female with hx of left shoulder injury 2016 with persistent pain resulting in arthroscopic surgery 2017 with reported increased pain and manipulation under anesthesia 2018 and aggressive PT to increase ROM afterwards presents with report of constant severe, debilitating left shoulder pain significant reduction in left shoulder AROM, strength and function. History and presentation is most consistent with regional chronic pain syndrome.     Patient will continue to benefit from skilled PT services to modify and progress therapeutic interventions, address functional mobility deficits, address ROM deficits, address strength deficits, analyze and address soft tissue restrictions, analyze and cue movement patterns, analyze and modify body mechanics/ergonomics and assess and modify postural abnormalities to attain remaining goals.      Reviewed current  HEP, provided modifications, and will f/u in clinic for PT.  ===========================================================================================  Eval Complexity: History MEDIUM  Complexity : 1-2 comorbidities / personal factors will impact the outcome/ POC ;  Examination  LOW Complexity : 1-2 Standardized tests and measures addressing body structure, function, activity limitation and / or participation in recreation ; Presentation LOW Complexity : Stable, uncomplicated ;  Decision Making MEDIUM Complexity : FOTO score of 26-74; Overall Complexity LOW   Problem List: pain affecting function, decrease ROM, decrease strength, decrease ADL/ functional abilitiies, decrease activity tolerance and decrease flexibility/ joint mobility   Treatment Plan may include any combination of the following: Therapeutic exercise, Therapeutic activities, Neuromuscular re-education, Physical agent/modality, Manual therapy, Aquatic therapy, Patient education, Self Care training and Functional mobility training  Patient / Family readiness to learn indicated by: asking questions, trying to perform skills and interest  Persons(s) to be included in education: patient (P)  Barriers to Learning/Limitations: no  Measures Taken: NA  Patient Goal (s): \"I just want the pain to ease up enough that I can move and use my left arm some. \"   Patient self reported health status: good  Rehabilitation Potential: good  Goals:  Short Term Goals: To be accomplished in 4 weeks:   Patient will report compliance with HEP at least 1x/day to aid in rehabilitation program.   Status at IE: Reviewed patient current HEP and provided recommendations for modifications. Current: Same as IE     Patient will display increase in left shoulder flexion and abduction AROM to 75 degrees  to aid in completion of ADLs. Status at IE: flexion 45, abduction 21   Current: Same as IE      Long Term Goals:  To be accomplished in 12 weeks:   Patient will report compliance with HEP a least 3-4x/week to aid in rehabilitation/strengthening program.   Status at IE: Reviewed patient current HEP and provided recommendations for modifications. Current: Same as IE     Patient will report no pain greater than 3-4/10 with overhead activities to aid in completion of ADLs. Status at IE: pain left shoulder 5-10/10. Current: Same as IE     Patient will increase left shoulder flexion and abduction strength to 3+/5  to aid in completion of ADLs. Status at IE: left shoulder abduction and flexion 1/5   Current: Same as IE     Patient will increase FOTO score to 53 points overall to demonstrate improvement in functional status. Status at IE: FOTO 36   Current: Same as IE            Frequency / Duration:   Patient to be seen 2  times per week for 12  weeks:  Patient / Caregiver education and instruction: self care and exercises      . Therapist Signature: Nayely Smith DPT Date: 7/45/3055   Certification Period: NA Time: 3:42 PM   ===========================================================================================  I certify that the above Physical Therapy Services are being furnished while the patient is under my care. I agree with the treatment plan and certify that this therapy is necessary. Physician Signature:        Date:       Time:     Please sign and return to [facility] or you may fax the signed copy to [phone]. Thank you.

## 2019-09-17 NOTE — PROGRESS NOTES
PT DAILY TREATMENT NOTE/SHOULDER EVAL 10-18    Patient Name: Mark Guerrero  Date:2019  : 1965  [x]  Patient  Verified  Payor: [facility] MEDICAID / Plan: Aprilkgcamilla 46 / Product Type: Managed Care Medicaid /    In time:100  Out time:205  Total Treatment Time (min): 65  Visit #: 1 of 12    Treatment Area: Left shoulder pain [M25.512]    SUBJECTIVE  Pain Level (0-10 scale): 6  [x]constant []intermittent []improving []worsening [x]no change past month    Any medication changes, allergies to medications, adverse drug reactions, diagnosis change, or new procedure performed?: [x] No    [] Yes (see summary sheet for update)  Subjective functional status/changes:     Left hand dominant patient has c/c of anterior and posterior left shoulder pain since . JES: Initial onset due to lifting 5 gallon water bottle onto water cooler. Patient describes pain as constant ache with intermittent sharp pain. Pain is worse in PM, progressively worsens throughout the day with activity. Denies numbness/tingling. Denies popping/clicking. Aggravating factors: increases with any active movement but especially with attempts to perform above shoulder tasks. Alleviating factors: decreases with use of heating pad. Denies red flags: SOB, chest pain, dizziness/lightheadedness, blurred/double vision, HA, chills/fevers, night sweats, change in bowel/bladder control, abdominal pain, difficulty swallowing, slurred speech, unexplained weight gain/loss, nausea, vomiting. PMHx: HTN,  depression. Surgical Hx: left shoulder 2017 rotator cuff repair with biceps tenodesis and acromiplasty, 2018 lysis of adhesion for adhesive capsulitis with manipulation under anesthesia . Social Hx: single lives alone, second floor apartment, no alcohol, no tobacco, [employer]  who has been out of work for past year. PLOF: working full time with no pain or restrictions left shoulder.  CLOF: very limited use of left UE for ADLs, moderate difficult sleeping. Diagnostic Imaging: MRI 8/2019 small left rotator cuff tear likely not requiring further surgery. Has had prior PT including dry needling and cortisone injections with no significant improvement. OBJECTIVE/EXAMINATION    30 min [x]Eval                  []Re-Eval     35 min Therapeutic Exercise:  [x] See flow sheet :   Rationale: increase ROM, increase strength and decrease pain to improve the patients ability to complete ADLs          With   [] TE   [] TA   [] neuro   [] other: Patient Education: [x] Review HEP    [] Progressed/Changed HEP based on:   [] positioning   [] body mechanics   [] transfers   [] heat/ice application    [] other:        Physical Therapy Evaluation - Shoulder    Posture: [] Poor    [x] Fair    [] Good    Describe: left scapula elevated and protracted in guarded position. ROM:  [] Unable to assess at this time                                           AROM                                                              PROM   Left Right  Left Right   Flexion 45 158 Flexion 45 161   Extension 16 59 Extension 16 62   Scaption/ABD 20 167 Scaptin/ABD 21 168   ER @ 0 Degrees 10 73 ER @ 0 Degrees 11 75   IR @ 90 Degrees 44 80 IR @ 90 Degrees 44 82     End Feel / Painful Arc: Unable to test    UE Strength:   [] Unable to assess at this time                                                                            L (1-5) R (1-5) Pain   Flexion 1 5 [x] Yes   [] No   Abduction 1 5 [x] Yes   [] No   ER 2 5 [x] Yes   [] No   IR 3+ 5 [] Yes   [x] No   Extension 3+ 5 [x] Yes   [] No   Elbow flexion 3+ 5 [x] Yes   [] No   Elbow Ext 4 5 [x] Yes   [] No   Wrist Flexion 4 5 [x] Yes   [] No   Wrist Extension 4 5 [x] Yes   [] No       Scapulohumoral Control / Rhythm:  Able to eccentrically lower with good control? Left: [] Yes   [x] No     Right: [x] Yes   [] No    Accessory Motions: Unable to tolerate testing due to severity of pain.     Palpation  [] Min  [x] Mod  [] Severe    Location: posterior upper trapezius and scapular region  [] Min  [x] Mod  [] Severe    Location: anterior left shoulder, especially bicipital groove    Optional Tests:    Unable to tolerate special tests due to limited AROM and pain. Other Tests / Comments:      Pain Level (0-10 scale) post treatment: 6    ASSESSMENT/Changes in Function: 49 y.o left dominant female with hx of left shoulder injury 2016 with persistent pain resulting in arthroscopic surgery 2017 with reported increased pain and manipulation under anesthesia 2018 and aggressive PT to increase ROM afterwards presents with report of constant severe, debilitating left shoulder pain significant reduction in left shoulder AROM, strength and function. History and presentation is most consistent with regional chronic pain syndrome. Patient will continue to benefit from skilled PT services to modify and progress therapeutic interventions, address functional mobility deficits, address ROM deficits, address strength deficits, analyze and address soft tissue restrictions, analyze and cue movement patterns, analyze and modify body mechanics/ergonomics and assess and modify postural abnormalities to attain remaining goals.      [x]  See Plan of Care  []  See progress note/recertification  []  See Discharge Summary         Progress towards goals / Updated goals:  See POC    PLAN  []  Upgrade activities as tolerated     [x]  Continue plan of care  []  Update interventions per flow sheet       []  Discharge due to:_  []  Other:_      Tobias Gaming PT 9/17/2019  1:02 PM

## 2019-09-20 ENCOUNTER — HOSPITAL ENCOUNTER (OUTPATIENT)
Dept: PHYSICAL THERAPY | Age: 54
Discharge: HOME | End: 2019-09-20
Payer: MEDICAID

## 2019-09-20 PROBLEM — Z12.11 ENCOUNTER FOR SCREENING COLONOSCOPY: Status: RESOLVED | Noted: 2019-04-25 | Resolved: 2019-09-20

## 2019-09-20 PROCEDURE — 97110 THERAPEUTIC EXERCISES: CPT

## 2019-09-20 NOTE — PROGRESS NOTES
PT DAILY TREATMENT NOTE    Patient Name: Lubna Fabian  Date:2019  : 1965  [x]  Patient  Verified  Payor: [facility] MEDICAID / Plan: Zeke 46 / Product Type: Managed Care Medicaid /    In time:10:00  Out time:10:29  Total Treatment Time (min): 29  Total Timed Codes (min): 29  1:1 Treatment Time ( only):    Visit #: 2 of 12    Treatment Area: Left shoulder pain [M25.512]    SUBJECTIVE  Pain Level (0-10 scale): 7-8  Any medication changes, allergies to medications, adverse drug reactions, diagnosis change, or new procedure performed?: [x] No    [] Yes (see summary sheet for update)  Subjective functional status/changes:   [] No changes reported  \"I am having trouble getting used to the idea of doing exercises in pain free range only. I have been pushing exercises into a high level of pain for so long trying to get the shoulder to work right that it is hard making a change in my thinking. \"    OBJECTIVE    29 min Therapeutic Exercise:  [x] See flow sheet :   Rationale: increase ROM, increase strength and decrease pain to improve the patients ability to complete ADLs    With   [] TE   [] TA   [] neuro   [] other: Patient Education: [x] Review HEP    [] Progressed/Changed HEP based on:   [] positioning   [] body mechanics   [] transfers   [] heat/ice application    [] other:      Other Objective/Functional Measures: NA     Pain Level (0-10 scale) post treatment: 7-8    ASSESSMENT/Changes in Function: Patient responds well to treatment session. Requires frequent cueing to perform exercises in pain free range only. Provided patient additional educational resources to assist in understanding change in approach to exercise. No adverse effects were noted from today's treatment session.      Patient will continue to benefit from skilled PT services to modify and progress therapeutic interventions, address functional mobility deficits, address ROM deficits, address strength deficits, analyze and address soft tissue restrictions, analyze and cue movement patterns, analyze and modify body mechanics/ergonomics, assess and modify postural abnormalities,  and instruct in home and community integration to attain remaining goals. []  See Plan of Care  []  See progress note/recertification  []  See Discharge Summary         Progress towards goals / Updated goals:  Short Term Goals: To be accomplished in 4 weeks:                   Patient will report compliance with HEP at least 1x/day to aid in rehabilitation program.                   Status at IE: Reviewed patient current HEP and provided recommendations for modifications. Current: Same as IE                      Patient will display increase in left shoulder flexion and abduction AROM to 75 degrees  to aid in completion of ADLs. Status at IE: flexion 45, abduction 21                   Current: Same as IE        Long Term Goals: To be accomplished in 12 weeks:                   Patient will report compliance with HEP a least 3-4x/week to aid in rehabilitation/strengthening program.                   Status at IE: Reviewed patient current HEP and provided recommendations for modifications. Current: Same as IE                      Patient will report no pain greater than 3-4/10 with overhead activities to aid in completion of ADLs. Status at IE: pain left shoulder 5-10/10. Current: Same as IE                      Patient will increase left shoulder flexion and abduction strength to 3+/5  to aid in completion of ADLs. Status at IE: left shoulder abduction and flexion 1/5                   Current: Same as IE                      Patient will increase FOTO score to 53 points overall to demonstrate improvement in functional status.                     Status at IE: FOTO 36                   Current: Same as IE       PLAN  []  Upgrade activities as tolerated [x]  Continue plan of care  []  Update interventions per flow sheet       []  Discharge due to:_  []  Other:_      Robert Casey, PT, DPT 9/20/2019  10:29 AM    Future Appointments   Date Time Provider Casey Morales   9/24/2019  9:40 AM Srinivas Betancur DO [address] [place] [address]   9/24/2019  3:00 PM Yue Marker, PT [facility]   9/27/2019  7:30 AM Mac Duval, PT [facility]   10/1/2019  9:00 AM Yue Marker, PT [facility]   10/4/2019 10:00 AM Yue Marker, PT [facility]   10/8/2019  9:30 AM Yue Marker, PT [facility]   10/15/2019  9:30 AM Yue Marker, PT [facility]   10/18/2019  9:00 AM Yue Marker, PT [facility]   10/22/2019  9:00 AM Yue Marker, PT [facility]   10/25/2019  9:00 AM Yue Marker, PT [facility]   10/29/2019  9:00 AM Yue Marker, PT [facility]   11/13/2019 11:00 AM Shiela Victoria NP [facility] None

## 2019-09-24 ENCOUNTER — OFFICE VISIT (OUTPATIENT)
Dept: CARDIOLOGY CLINIC | Age: 54
End: 2019-09-24

## 2019-09-24 ENCOUNTER — HOSPITAL ENCOUNTER (OUTPATIENT)
Dept: PHYSICAL THERAPY | Age: 54
Discharge: HOME | End: 2019-09-24
Payer: MEDICAID

## 2019-09-24 VITALS
DIASTOLIC BLOOD PRESSURE: 96 MMHG | BODY MASS INDEX: 31.41 KG/M2 | HEART RATE: 84 BPM | OXYGEN SATURATION: 96 % | HEIGHT: 64 IN | WEIGHT: 184 LBS | SYSTOLIC BLOOD PRESSURE: 136 MMHG

## 2019-09-24 DIAGNOSIS — R07.9 CHEST PAIN, UNSPECIFIED TYPE: Primary | ICD-10-CM

## 2019-09-24 PROCEDURE — 97110 THERAPEUTIC EXERCISES: CPT

## 2019-09-24 RX ORDER — GABAPENTIN 300 MG/1
CAPSULE ORAL
Refills: 0 | COMMUNITY
Start: 2019-09-11 | End: 2022-07-08

## 2019-09-24 NOTE — PROGRESS NOTES
PT DAILY TREATMENT NOTE    Patient Name: Ann Roblero  Date:2019  : 1965  [x]  Patient  Verified  Payor: [facility] MEDICAID / Plan: Aprilkgcamilla 46 / Product Type: Managed Care Medicaid /    In time:3:00  Out time:3:50  Total Treatment Time (min): 50  Total Timed Codes (min): 45  1:1 Treatment Time ( [address] only): 39   Visit #: 3 of 12    Treatment Area: Left shoulder pain [M25.512]    SUBJECTIVE  Pain Level (0-10 scale): 8  Any medication changes, allergies to medications, adverse drug reactions, diagnosis change, or new procedure performed?: [x] No    [] Yes (see summary sheet for update)  Subjective functional status/changes:   [] No changes reported  \"I am trying to do the exercises through pain free ranges like you have taught me, but it is very hard to get used to not pushing the exercises really hard to gain motion and just endure the severe pain. \"    OBJECTIVE    Modalities Rationale:     decrease edema and decrease pain to improve patient's ability to Complete ADLS  5 min [x]  Vasopneumatic Device, press/temp: Left shoulder    min []  Other:    [] Skin assessment post-treatment (if applicable):    []  intact    []  redness- no adverse reaction     []redness - adverse reaction:          45 min Therapeutic Exercise:  [x] See flow sheet :   Rationale: increase ROM, increase strength and decrease pain to improve the patients ability to complete ADLs    With   [] TE   [] TA   [] neuro   [] other: Patient Education: [x] Review HEP    [] Progressed/Changed HEP based on:   [] positioning   [] body mechanics   [] transfers   [] heat/ice application    [] other:      Other Objective/Functional Measures: NA     Pain Level (0-10 scale) post treatment: 8    ASSESSMENT/Changes in Function: Patient with fair tolerance for exercises. Continuing to emphasize pain free performance of exercises only. Discussed trial of aquatics with pt as this was recommended by her physician.  Will conduct trial of aquatics next session to determine if exercise tolerance is improved in aquatic environment. No adverse effects were noted from today's treatment session. Patient will continue to benefit from skilled PT services to modify and progress therapeutic interventions, address functional mobility deficits, address ROM deficits, address strength deficits, analyze and address soft tissue restrictions, analyze and cue movement patterns, analyze and modify body mechanics/ergonomics, assess and modify postural abnormalities,  and instruct in home and community integration to attain remaining goals. []  See Plan of Care  []  See progress note/recertification  []  See Discharge Summary         Progress towards goals / Updated goals:  Short Term Goals: To be accomplished in 4 weeks:                   Patient will report compliance with HEP at least 1x/day to aid in rehabilitation program.                   Status at IE: Reviewed patient current HEP and provided recommendations for modifications.                   Current: Patient states she is working on HEP frequently at home. 9/24/19                      Patient will display increase in left shoulder flexion and abduction AROM to 75 degrees  to aid in completion of ADLs.                  Status at IE: flexion 45, abduction 21                   Current: Same as IE        Long Term Goals: To be accomplished in 12 weeks:                   Patient will report compliance with HEP a least 3-4x/week to aid in rehabilitation/strengthening program.                   Status at IE: Reviewed patient current HEP and provided recommendations for modifications.                   Current: Same as IE                      Patient will report no pain greater than 3-4/10 with overhead activities to aid in completion of ADLs.                    Status at IE: pain left shoulder 5-10/10.                   Current: Same as IE                      Patient will increase left shoulder flexion and abduction strength to 3+/5  to aid in completion of ADLs.                  Status at IE: left shoulder abduction and flexion 1/5                   Current: Same as IE                      Patient will increase FOTO score to 53 points overall to demonstrate improvement in functional status.                     Status at IE: FOTO 36                   Current: Same as IE       PLAN  []  Upgrade activities as tolerated     [x]  Continue plan of care  []  Update interventions per flow sheet       []  Discharge due to:_  []  Other:_      Julio César Lang PT, DPT 9/24/2019  4:05 PM    Future Appointments   Date Time Provider Casey Morales   9/27/2019 11:00 AM Jus Duval, PT [facility]   10/1/2019  9:00 AM Daniel Barron PT [facility]   10/4/2019 10:00 AM Daniel Barron, PT [facility]   10/8/2019  9:30 AM Daniel Barron, PT [facility]   10/15/2019  9:30 AM Daniel Barron, PT [facility]   10/18/2019  9:00 AM Daniel Barron, PT [facility]   10/22/2019  9:00 AM Daniel Barron, PT [facility]   10/25/2019  9:00 AM Daniel Barron, PT [facility]   10/29/2019  9:00 AM Daniel Barron PT [facility]   11/13/2019 11:00 AM Denny Kidd, [place] [facility] None

## 2019-09-24 NOTE — PROGRESS NOTES
Clif Joseph    CC: CP    HPI    Clif Joseph is a 49 y.o. extremely pleasant  female with no known heart disease here for evaluation of persistent chest discomfort and shortness of breath. As you know patient has noticed pressure in her chest off and on for almost a year now. Describes a heavy pressure in the center and she tends to take a deep breath and hold it in and can make the pain go away. She also has some swelling in her legs and complains of dizziness. She might feel some shortness of breath but is not really with exertion. Actually she does not believe any of her symptoms are really with exertion but admits she is really not been able to be active due to issues with her shoulder. Takes quite a bit of medications for musculoskeletal problems but wanted to make sure that her heart was okay as her mother had premature coronary disease in her 46s. Otherwise she has not passed out and denies palpitations. It should be noted that she has been on Norvasc for many years but was recently increased to 10 mg daily is unclear when this medication change was made and patient herself was unable to tell me. It difficult did look like it was much earlier this year and unrelated to her symptoms though she said that Norvasc does cause her swelling but then has been unclear why she is on her current dose. CV RFs: HTN, +FH premature CAD    No past medical history on file. Past Surgical History:   Procedure Laterality Date    HX SHOULDER ARTHROSCOPY Left 2017 and oct 2018       Current Outpatient Medications   Medication Sig Dispense Refill    gabapentin (NEURONTIN) 300 mg capsule TAKE 1 CAPSULE BY MOUTH AT DINNER TIME AND 1 CAPSULE BY MOUTH AT BEDTIME  0    DULoxetine (CYMBALTA) 60 mg capsule duloxetine 60 mg capsule,delayed release   TAKE 1 CAPSULE(S) EVERY DAY BY ORAL ROUTE FOR 15 DAYS.  celecoxib (CELEBREX) 200 mg capsule Take 200 mg by mouth.       cyclobenzaprine (FLEXERIL) 5 mg tablet Take 1 Tab by mouth three (3) times daily as needed for Muscle Spasm(s). 30 Tab 0    escitalopram oxalate (LEXAPRO) 20 mg tablet Take 1 Tab by mouth daily. 90 Tab 3    amLODIPine (NORVASC) 10 mg tablet Take 1 Tab by mouth daily. 90 Tab 3       Allergies   Allergen Reactions    Prednisone Other (comments)     Coughs up blood         Social History     Socioeconomic History    Marital status: UNKNOWN     Spouse name: Not on file    Number of children: Not on file    Years of education: Not on file    Highest education level: Not on file   Occupational History    Not on file   Social Needs    Financial resource strain: Not on file    Food insecurity:     Worry: Not on file     Inability: Not on file    Transportation needs:     Medical: Not on file     Non-medical: Not on file   Tobacco Use    Smoking status: Never Smoker    Smokeless tobacco: Never Used   Substance and Sexual Activity    Alcohol use: No     Frequency: Never    Drug use: No    Sexual activity: Yes     Partners: Male   Lifestyle    Physical activity:     Days per week: Not on file     Minutes per session: Not on file    Stress: Not on file   Relationships    Social connections:     Talks on phone: Not on file     Gets together: Not on file     Attends [employer] service: Not on file     Active member of club or organization: Not on file     Attends meetings of clubs or organizations: Not on file     Relationship status: Not on file    Intimate partner violence:     Fear of current or ex partner: Not on file     Emotionally abused: Not on file     Physically abused: Not on file     Forced sexual activity: Not on file   Other Topics Concern    Not on file   Social History Narrative    Not on file        FH: + premature CAD, no SCD    Review of Systems    14 pt Review of Systems is negative unless otherwise mentioned in the HPI.     Wt Readings from Last 3 Encounters:   09/24/19 83.5 kg (184 lb)   08/14/19 84.1 kg (185 lb 6.4 oz) 04/25/19 84.7 kg (186 lb 12.8 oz)     Temp Readings from Last 3 Encounters:   08/14/19 98.4 °F (36.9 °C) (Oral)   04/25/19 98.2 °F (36.8 °C) (Oral)   02/18/19 98.2 °F (36.8 °C) (Oral)     BP Readings from Last 3 Encounters:   08/14/19 127/86   04/25/19 (!) 138/92   02/18/19 138/86     Pulse Readings from Last 3 Encounters:   08/14/19 95   04/25/19 84   02/18/19 84     Physical Exam:    Visit Vitals  Ht 5' 4\" (1.626 m)   Wt 83.5 kg (184 lb)   BMI 31.58 kg/m²      Physical Exam   Constitutional: She is oriented to person, place, and time. She appears well-developed and well-nourished. No distress. HENT:   Head: Normocephalic and atraumatic. Eyes: Pupils are equal, round, and reactive to light. EOM are normal.   Cardiovascular: Normal rate, regular rhythm, normal heart sounds and intact distal pulses. Exam reveals no gallop and no friction rub. No murmur heard.  ++some tenderness to palpation anterior chest wall   Pulmonary/Chest: Effort normal and breath sounds normal. No respiratory distress. She has no wheezes. She has no rales. She exhibits no tenderness. Abdominal: Soft. Bowel sounds are normal.   Musculoskeletal: She exhibits no edema or tenderness. Neurological: She is alert and oriented to person, place, and time. Skin: Skin is warm and dry. She is not diaphoretic. Psychiatric: She has a normal mood and affect. EKG today shows: NSR, normal axis and intervals, no ST segment abnormalities    Impression and Plan:  Lubna Fabian is a 49 y.o. with:    1.) Atypical but persistent CP  2.) HTN  3.) +FH premature CAD  4.) MSK shoulder pain, very well could be contributing/ cause of #1    1.) Pharm nuc  2.) Echo  3.) If testing benign/ negative can follow up with me prn, and likely cause is referred MSK pain from shoulder    Pt was concerned about her ability to balance and hold on treadmill due to her MSK pain/ shoulder problems.  Her symptoms are atypical but so persistent- that in the setting of her risk factors we decided ischemic evaluation would help give her reassurance. Thank you for allowing me to participate in the care of your patient, please do not hesitate to call with questions or concerns.     Kindest Regards,    Bisi Hdz, DO

## 2019-09-27 ENCOUNTER — HOSPITAL ENCOUNTER (OUTPATIENT)
Dept: PHYSICAL THERAPY | Age: 54
Discharge: HOME | End: 2019-09-27
Payer: MEDICAID

## 2019-09-27 PROCEDURE — 97110 THERAPEUTIC EXERCISES: CPT

## 2019-09-30 ENCOUNTER — PATIENT OUTREACH (OUTPATIENT)
Dept: FAMILY MEDICINE CLINIC | Age: 54
End: 2019-09-30

## 2019-09-30 NOTE — PROGRESS NOTES
NN health screening:    Confirmed with GI patient's consultation with [facility] is October 3rd and colonoscopy procedure date is October 21st. Will continue to follow as best possible as patient still is not calling me back. Still awaiting completion of mammogram as well.

## 2019-10-01 ENCOUNTER — HOSPITAL ENCOUNTER (OUTPATIENT)
Dept: PHYSICAL THERAPY | Age: 54
Discharge: HOME | End: 2019-10-01
Payer: MEDICAID

## 2019-10-01 PROCEDURE — 97110 THERAPEUTIC EXERCISES: CPT

## 2019-10-01 NOTE — PROGRESS NOTES
PT DAILY TREATMENT NOTE    Patient Name: Yoly Cabral  Date:10/1/2019  : 1965  [x]  Patient  Verified  Payor: [facility] MEDICAID / Plan: [place] 46 / Product Type: Managed Care Medicaid /    In time:855  Out time:941  Total Treatment Time (min): 46  Total Timed Codes (min): 46  1:1 Treatment Time ( [address] only):    Visit #: 5 of 12    Treatment Area: Left shoulder pain [M25.512]    SUBJECTIVE  Pain Level (0-10 scale): 6  Any medication changes, allergies to medications, adverse drug reactions, diagnosis change, or new procedure performed?: [x] No    [] Yes (see summary sheet for update)  Subjective functional status/changes:   [] No changes reported  \"The pain is not only going into my shoulder and arm, it is going up to my neck and giving me some bad headaches. \"    OBJECTIVE    46 min Therapeutic Exercise:  [x] See flow sheet :   Rationale: increase ROM, increase strength and decrease pain to improve the patients ability to complete ADLs     With   [] TE   [] TA   [] neuro   [] other: Patient Education: [x] Review HEP    [] Progressed/Changed HEP based on:   [] positioning   [] body mechanics   [] transfers   [] heat/ice application    [] other:      Other Objective/Functional Measures: NA     Pain Level (0-10 scale) post treatment: 7    ASSESSMENT/Changes in Function: Patient responds well to treatment session. Patient with increased AROM during exercises today with less increase in pain through gentle AROM exercises left UE. Anahi Vega No adverse effects were noted from today's treatment session.      Patient will continue to benefit from skilled PT services to modify and progress therapeutic interventions, address functional mobility deficits, address ROM deficits, address strength deficits, analyze and address soft tissue restrictions, analyze and cue movement patterns, analyze and modify body mechanics/ergonomics, assess and modify postural abnormalities,  and instruct in home and community integration to attain remaining goals. []  See Plan of Care  []  See progress note/recertification  []  See Discharge Summary         Progress towards goals / Updated goals:  Short Term Goals: To be accomplished in 4 weeks:                   CTNXYZS will report compliance with HEP at least 1x/day to aid in rehabilitation program.                   Status at IE: Reviewed patient current HEP and provided recommendations for modifications.                   Current: Patient states she is working on HEP frequently at home.  9/24/19                      Patient will display increase in left shoulder flexion and abduction AROM to 75 degrees  to aid in completion of ADLs.                  Status at IE: flexion 45, abduction 21                   Current: Same as IE        Long Term Goals: To be accomplished in 12 weeks:                   Patient will report compliance with HEP a least 3-4x/week to aid in rehabilitation/strengthening program.                   Status at IE: Reviewed patient current HEP and provided recommendations for modifications.                   Current: Same as IE                      Patient will report no pain greater than 3-4/10 with overhead activities to aid in completion of ADLs.                  Status at IE: pain left shoulder 5-10/10.                   Current: Left UE pain recently 8/10, reduced to 6/10 today. 10/1/19                      Patient will increase left shoulder flexion and abduction strength to 3+/5  to aid in completion of ADLs.                  Status at IE: left shoulder abduction and flexion 1/5                   Current: Same as IE                      Patient will increase FOTO score to 53 points overall to demonstrate improvement in functional status.                     Status at IE: FOTO 36                   Current: Same as IE    PLAN  []  Upgrade activities as tolerated     [x]  Continue plan of care  []  Update interventions per flow sheet       []  Discharge due to:_  [] Other:_      Gio Alaniz, PT, DPT 10/1/2019  9:37 AM    Future Appointments   Date Time Provider Casey [place]   10/4/2019 10:30 AM Federico Duval PT [facility]   10/8/2019  9:30 AM EMEKA WhitakerHPTELISHA [facility]   10/15/2019  9:30 AM EMEKA WhitakerHPTELISHA [facility]   10/18/2019  9:00 AM Hortensia Sen PT [facility]   10/22/2019  9:00 AM Hortesnia Sen PT [facility]   10/25/2019  9:00 AM EMEKA WhitakerHPTVY [facility]   10/29/2019  9:00 AM Hortensia Sen PT [facility]   11/13/2019 11:00 AM Itzel Moore NP [facility] None

## 2019-10-04 ENCOUNTER — HOSPITAL ENCOUNTER (OUTPATIENT)
Dept: PHYSICAL THERAPY | Age: 54
Discharge: HOME | End: 2019-10-04
Payer: MEDICAID

## 2019-10-04 PROCEDURE — 97110 THERAPEUTIC EXERCISES: CPT

## 2019-10-04 NOTE — PROGRESS NOTES
PT DAILY TREATMENT NOTE    Patient Name: Yoly Cabral  Date:10/4/2019  : 1965  [x]  Patient  Verified  Payor: [facility] MEDICAID / Plan: [address] 46 / Product Type: Managed Care Medicaid /    In time:1030  Out time:1059  Total Treatment Time (min): 29  Total Timed Codes (min): 29  1:1 Treatment Time ( only):    Visit #: 6 of 12    Treatment Area: Left shoulder pain [M25.512]    SUBJECTIVE  Pain Level (0-10 scale): 7  Any medication changes, allergies to medications, adverse drug reactions, diagnosis change, or new procedure performed?: [x] No    [] Yes (see summary sheet for update)  Subjective functional status/changes:   [] No changes reported  \"The doctor's office set me up with an H-wave stimulator to decrease the pain. So far, it hasn't helped. \"    OBJECTIVE    29 min Therapeutic Exercise:  [x] See flow sheet :   Rationale: increase ROM, increase strength and decrease pain to improve the patients ability to complete ADLs     With   [] TE   [] TA   [] neuro   [] other: Patient Education: [x] Review HEP    [] Progressed/Changed HEP based on:   [] positioning   [] body mechanics   [] transfers   [] heat/ice application    [] other:      Other Objective/Functional Measures: NA     Pain Level (0-10 scale) post treatment: 9    ASSESSMENT/Changes in Function: Performed aquatic therapy in warm water [facility] versus cool water pool to determine if warmer water and use of air jets would permit increased AROM left UE with decreased pain. Able to increase AROM exercises in warm water, but resulted in increased pain. No adverse effects were noted from today's treatment session.      Patient will continue to benefit from skilled PT services to modify and progress therapeutic interventions, address functional mobility deficits, address ROM deficits, address strength deficits, analyze and address soft tissue restrictions, analyze and cue movement patterns, analyze and modify body mechanics/ergonomics, assess and modify postural abnormalities,  and instruct in home and community integration to attain remaining goals. []  See Plan of Care  []  See progress note/recertification  []  See Discharge Summary         Progress towards goals / Updated goals:  Short Term Goals: To be accomplished in 4 weeks:                   CQIGVQP will report compliance with HEP at least 1x/day to aid in rehabilitation program.                   Status at IE: Reviewed patient current HEP and provided recommendations for modifications.                   Current: Patient states she is working on HEP frequently at home.  9/24/19                      Patient will display increase in left shoulder flexion and abduction AROM to 75 degrees  to aid in completion of ADLs.                  Status at IE: flexion 45, abduction 21                   Current: Same as IE        Long Term Goals: To be accomplished in 12 weeks:                   Patient will report compliance with HEP a least 3-4x/week to aid in rehabilitation/strengthening program.                   Status at IE: Reviewed patient current HEP and provided recommendations for modifications.                   Current: Same as IE                      Patient will report no pain greater than 3-4/10 with overhead activities to aid in completion of ADLs.                  Status at IE: pain left shoulder 5-10/10.                   Current: Left UE pain recently 8/10, reduced to 6/10 today. 10/1/19                      Patient will increase left shoulder flexion and abduction strength to 3+/5  to aid in completion of ADLs.                  Status at IE: left shoulder abduction and flexion 1/5                   Current: Same as IE                      Patient will increase FOTO score to 53 points overall to demonstrate improvement in functional status.                     Status at IE: FOTO 36                   Current: Same as IE    PLAN  []  Upgrade activities as tolerated     [x]  Continue plan of care  []  Update interventions per flow sheet       []  Discharge due to:_  []  Other:_      Adelina Rollins, PT, DPT 10/4/2019  11:34 AM    Future Appointments   Date Time Provider Casey Morales   10/8/2019  9:30 AM Tiny Mackenzie PT [facility]   10/15/2019  9:30 AM Britton Buck, PT [facility]   10/18/2019  9:00 AM Britton Buck, PT [facility]   10/22/2019 10:00 AM Britton Buck, PT [facility]   10/25/2019  9:00 AM Britton Buck, PT [facility]   10/29/2019  9:00 AM Britton Buck, PT [facility]   11/13/2019 11:00 AM [facility]

## 2019-10-08 ENCOUNTER — HOSPITAL ENCOUNTER (OUTPATIENT)
Dept: PHYSICAL THERAPY | Age: 54
Discharge: HOME | End: 2019-10-08
Payer: MEDICAID

## 2019-10-08 PROCEDURE — 97110 THERAPEUTIC EXERCISES: CPT

## 2019-10-08 NOTE — PROGRESS NOTES
PT DAILY TREATMENT NOTE    Patient Name: Robin Cummings  Date:10/8/2019  : 1965  [x]  Patient  Verified  Payor: [facility] MEDICAID / Plan: [address] 46 / Product Type: Managed Care Medicaid /    In time:933  Out time:1017  Total Treatment Time (min): 44  Total Timed Codes (min): 44  1:1 Treatment Time ( [address] only): 40   Visit #: 7 of 12    Treatment Area: Left shoulder pain [M25.512]    SUBJECTIVE  Pain Level (0-10 scale): 6  Any medication changes, allergies to medications, adverse drug reactions, diagnosis change, or new procedure performed?: [x] No    [] Yes (see summary sheet for update)  Subjective functional status/changes:   [] No changes reported  \"The aquatic setting doesn't seem to be helpful in allowing me to exercise with less pain. I am also trying the H-wave estim unit provided by my doctor. So far, it doesn't seem to be helping. The gentle ROM exercises you are showing me seem to help a little bit. \"    OBJECTIVE    44 min Therapeutic Exercise:  [x] See flow sheet :   Rationale: increase ROM, increase strength and decrease pain to improve the patients ability to complete ADLs    With   [] TE   [] TA   [] neuro   [] other: Patient Education: [x] Review HEP    [] Progressed/Changed HEP based on:   [] positioning   [] body mechanics   [] transfers   [] heat/ice application    [] other:      Other Objective/Functional Measures: NA     Pain Level (0-10 scale) post treatment: 7    ASSESSMENT/Changes in Function: Patient responds well to treatment session. Patient tolerating gentle left UE AROM and resistive exercises with more ROM and less increase in pain. Instructed patient in electrode placement options for trial of Hwave unit provided by MD. No adverse effects were noted from today's treatment session.      Patient will continue to benefit from skilled PT services to modify and progress therapeutic interventions, address functional mobility deficits, address ROM deficits, address strength deficits, analyze and address soft tissue restrictions, analyze and cue movement patterns, analyze and modify body mechanics/ergonomics, assess and modify postural abnormalities,  and instruct in home and community integration to attain remaining goals. []  See Plan of Care  []  See progress note/recertification  []  See Discharge Summary         Progress towards goals / Updated goals:  Short Term Goals: To be accomplished in 4 weeks:                   ABTFJLF will report compliance with HEP at least 1x/day to aid in rehabilitation program.                   Status at IE: Reviewed patient current HEP and provided recommendations for modifications.                   Current: Patient states she is working on HEP frequently at home.  9/24/19                      Patient will display increase in left shoulder flexion and abduction AROM to 75 degrees  to aid in completion of ADLs.                  Status at IE: flexion 45, abduction 21                   Current: flexion 55, abduction 24  10/8/19        Long Term Goals: To be accomplished in 12 weeks:                   Patient will report compliance with HEP a least 3-4x/week to aid in rehabilitation/strengthening program.                   Status at IE: Reviewed patient current HEP and provided recommendations for modifications.                   Current: Same as IE                      Patient will report no pain greater than 3-4/10 with overhead activities to aid in completion of ADLs.                  Status at IE: pain left shoulder 5-10/10.                   Current: Left UE pain recently 8/10, reduced to 6/10 today. 10/1/19                      Patient will increase left shoulder flexion and abduction strength to 3+/5  to aid in completion of ADLs.                    Status at IE: left shoulder abduction and flexion 1/5                   Current: Same as IE                      Patient will increase FOTO score to 53 points overall to demonstrate improvement in functional status.                     Status at IE: FOTO 36                   Current: Same as IE       PLAN  []  Upgrade activities as tolerated     [x]  Continue plan of care  []  Update interventions per flow sheet       []  Discharge due to:_  []  Other:_      Jovon Aguilar PT, DPT 10/8/2019  10:08 AM    Future Appointments   Date Time Provider Casey Morales   10/15/2019  9:30 AM [facility]   10/18/2019  9:00 AM Vince Mora, PT [facility]   10/22/2019 10:00 AM Vince Mora, PT [facility]   10/25/2019  9:00 AM Vince Mora, PT [facility]   10/29/2019  9:00 AM Vince Mora, PT [facility]   11/13/2019 11:00 AM Osmar Owens NP [facility] None

## 2019-10-15 ENCOUNTER — HOSPITAL ENCOUNTER (OUTPATIENT)
Dept: PHYSICAL THERAPY | Age: 54
Discharge: HOME | End: 2019-10-15
Payer: MEDICAID

## 2019-10-15 PROCEDURE — 97110 THERAPEUTIC EXERCISES: CPT

## 2019-10-15 NOTE — PROGRESS NOTES
[facility] at [address] [place] [address] Drive: [phone]   Fax: [phone]  Progress Note/Medicare Re-Certification  Patient Name: Jamie Jay : 1965   Medical   Diagnosis: Left shoulder pain [M25.512] Treatment Diagnosis: Left shoulder pain    Onset Date:      Referral Source: Blaine Benitez NP Start of Care [facility]): 19   Prior Hospitalization: See medical history Provider #: [id number]   Prior Level of Function: working full time with no pain or restrictions left shoulder   Comorbidities: HTN, depression   Medications: Verified on Patient Summary List      ===========================================================================================  Assessment / Summary of Care:  Jamie Jay is a 49 y.o.  yo female who is very well motivated and cooperative with PT. She is working hard at exercises and complying with all instructions. She is noting slight increases in AROM and reduced fear during active use left UE. However, overall gains are slight in relation to patient need for increased function left UE.    Patient will continue to benefit from skilled PT services to modify and progress therapeutic interventions, address functional mobility deficits, address ROM deficits, address strength deficits, analyze and address soft tissue restrictions, analyze and cue movement patterns, analyze and modify body mechanics/ergonomics, assess and modify postural abnormalities,  and instruct in home and community integration to attain remaining goals.    ===========================================================================================    Plan:Continue therapy per initial plan/protocol at a frequency of  2 x per week for 3 weeks    Goals:   Short Term Goals: To be accomplished in 4 weeks:                   Patient will report compliance with HEP at least 1x/day to aid in rehabilitation program.                   Status at IE: Reviewed patient current HEP and provided recommendations for modifications.                   Current: Patient states she is working on HEP frequently at home.  9/24/19                      Patient will display increase in left shoulder flexion and abduction AROM to 75 degrees  to aid in completion of ADLs.                  Status at IE: flexion 45, abduction 21                   Current: flexion 55, abduction 24  10/8/19                     Current: flexion 58, abduction 30  10/15/19     Long Term Goals: To be accomplished in 12 weeks:                   Patient will report compliance with HEP a least 3-4x/week to aid in rehabilitation/strengthening program.                   Status at IE: Reviewed patient current HEP and provided recommendations for modifications.                   Current: Patient reports doing the HEP frequently throughout the day every day. 10/15/19                      Patient will report no pain greater than 3-4/10 with overhead activities to aid in completion of ADLs.                  Status at IE: pain left shoulder 5-10/10.                   Current: Left UE pain persisting in 6-8/10. 10/15/19                      Patient will increase left shoulder flexion and abduction strength to 3+/5  to aid in completion of ADLs.                  Status at IE: left shoulder abduction and flexion 1/5                   Current: left shoulder flexion and abduction strength slight improvement to 1+/5  10/15/19                      Patient will increase FOTO score to 53 points overall to demonstrate improvement in functional status.                  Status at IE: FOTO 36                   Current: FOTO 32 slight regression   10/15/19     ===========================================================================================  Subjective:  \"I have a better understanding now of what is going on with my left arm and how to progress. But, the intense pain still makes it difficult.       Therapist Signature: Kiara Lazo Mukund, PT, DPT Date: 16/26/3996   Re-Certification: NA Time: 48:91 AM       I certify that the above Therapy Services are being furnished while the patient is under my care. I agree with the treatment plan and certify that this therapy is necessary. [] I have read the above and request that my patient continue as recommended. [] I have read the above report and request that my patient continue therapy with the following changes/special instructions: ______________________________________  [] I have read the above report and request that my patient be discharged from therapy    Physician's Signature:____________Date:_________TIME:________    ** Signature, Date and Time must be completed for valid certification **    [facility] at [address]         Phone: [phone]   Fax: [phone]  .

## 2019-10-15 NOTE — PROGRESS NOTES
PT DAILY TREATMENT NOTE    Patient Name: Hermila Shorten  Date:10/15/2019  : 1965  [x]  Patient  Verified  Payor: [facility] MEDICAID / Plan: Zeke 46 / Product Type: Managed Care Medicaid /    In time: 176  Out time: 1022  Total Treatment Time (min): 50  Total Timed Codes (min): 45  1:1 Treatment Time ( [address] only): 39   Visit #: 8 of 12    Treatment Area: Left shoulder pain [M25.512]    SUBJECTIVE  Pain Level (0-10 scale): 7  Any medication changes, allergies to medications, adverse drug reactions, diagnosis change, or new procedure performed?: [x] No    [] Yes (see summary sheet for update)  Subjective functional status/changes:   [] No changes reported  \"I am having trouble finding a pain specialist to consult with that takes my insurance. I am working with my PCP to find a pain specialist I can see. \"    OBJECTIVE    45 min Therapeutic Exercise:  [x] See flow sheet :   Rationale: increase ROM, increase strength and decrease pain to improve the patients ability to complete ADLs    With   [] TE   [] TA   [] neuro   [] other: Patient Education: [x] Review HEP    [] Progressed/Changed HEP based on:   [] positioning   [] body mechanics   [] transfers   [] heat/ice application    [] other:      Other Objective/Functional Measures: NA     Pain Level (0-10 scale) post treatment: 7    ASSESSMENT/Changes in Function: Patient is very well motivated and cooperative with PT. She is working hard at exercises and complying with all instructions. She is noting slight increases in AROM and reduced fear during active use left UE. However, overall gains are slight in relation to patient need for increased function left UE. Patient responds well to treatment session. No adverse effects were noted from today's treatment session.      Patient will continue to benefit from skilled PT services to modify and progress therapeutic interventions, address functional mobility deficits, address ROM deficits, address strength deficits, analyze and address soft tissue restrictions, analyze and cue movement patterns, analyze and modify body mechanics/ergonomics, assess and modify postural abnormalities,  and instruct in home and community integration to attain remaining goals. []  See Plan of Care  [x]  See progress note/recertification  []  See Discharge Summary         Progress towards goals / Updated goals:  Short Term Goals: To be accomplished in 4 weeks:                   MMYCBWM will report compliance with HEP at least 1x/day to aid in rehabilitation program.                   Status at IE: Reviewed patient current HEP and provided recommendations for modifications.                   Current: Patient states she is working on HEP frequently at home.  9/24/19                      Patient will display increase in left shoulder flexion and abduction AROM to 75 degrees  to aid in completion of ADLs.                  Status at IE: flexion 45, abduction 21                   Current: flexion 55, abduction 24  10/8/19     Current: flexion 58, abduction 30  10/15/19          Long Term Goals: To be accomplished in 12 weeks:                   Patient will report compliance with HEP a least 3-4x/week to aid in rehabilitation/strengthening program.                   Status at IE: Reviewed patient current HEP and provided recommendations for modifications.                   Current: Patient reports doing the HEP frequently throughout the day every day. 10/15/19                      Patient will report no pain greater than 3-4/10 with overhead activities to aid in completion of ADLs.                  Status at IE: pain left shoulder 5-10/10.                   Current: Left UE pain persisting in 6-8/10. 10/15/19                      Patient will increase left shoulder flexion and abduction strength to 3+/5  to aid in completion of ADLs.                    Status at IE: left shoulder abduction and flexion 1/5                   Current: left shoulder flexion and abduction strength slight improvement to 1+/5  10/15/19                      Patient will increase FOTO score to 53 points overall to demonstrate improvement in functional status.                     Status at IE: FOTO 36                   Current: FOTO 32 slight regression   10/15/19    PLAN  []  Upgrade activities as tolerated     [x]  Continue plan of care  []  Update interventions per flow sheet       []  Discharge due to:_  []  Other:_      Neda Lee PT, DPT 10/15/2019  9:49 AM    Future Appointments   Date Time Provider Casey Morales   10/25/2019  9:00 AM Jayda Duval, PT [facility]   10/29/2019  9:00 AM Jayda Duval, PT MIHPLADARIUS THE [facility]   10/29/2019  9:30 AM THE [facility] NM DOSE/ PREP RM 1 [address] THE [facility]   10/29/2019 10:30 AM [address] CV SERV MIHNINV THE [facility]   10/29/2019  1:00 PM [facility] [address] THE [facility]   11/1/2019 10:00 AM EMEKA WildeHPTELISHA THE [facility]   11/8/2019 10:00 AM Jayda Duval, PT MIHPLADARIUS THE [facility]   11/13/2019 11:00 AM Jeanna Aranda NP NSFP None

## 2019-10-18 ENCOUNTER — APPOINTMENT (OUTPATIENT)
Dept: PHYSICAL THERAPY | Age: 54
End: 2019-10-18
Payer: MEDICAID

## 2019-10-22 ENCOUNTER — APPOINTMENT (OUTPATIENT)
Dept: PHYSICAL THERAPY | Age: 54
End: 2019-10-22
Payer: MEDICAID

## 2019-10-25 ENCOUNTER — HOSPITAL ENCOUNTER (OUTPATIENT)
Dept: PHYSICAL THERAPY | Age: 54
Discharge: HOME | End: 2019-10-25
Payer: MEDICAID

## 2019-10-25 PROCEDURE — 97110 THERAPEUTIC EXERCISES: CPT

## 2019-10-25 NOTE — PROGRESS NOTES
PT DAILY TREATMENT NOTE    Patient Name: Jamie Jay  Date:10/25/2019  : 1965  [x]  Patient  Verified  Payor: [facility] MEDICAID / Plan: Aprilkgatajanet 46 / Product Type: Managed Care Medicaid /    In time: 200  Out time: 930  Total Treatment Time (min): 40  Total Timed Codes (min): 40  1:1 Treatment Time ( only): 40   Visit #: 9 of 12    Treatment Area: Left shoulder pain [M25.512]    SUBJECTIVE  Pain Level (0-10 scale): 6  Any medication changes, allergies to medications, adverse drug reactions, diagnosis change, or new procedure performed?: [x] No    [] Yes (see summary sheet for update)  Subjective functional status/changes:   [] No changes reported  \"I had an unrelated medical procedure so couldn't come in last week. \"    OBJECTIVE    40 min Therapeutic Exercise:  [x] See flow sheet :   Rationale: increase ROM, increase strength and decrease pain to improve the patients ability to complete ADLs     With   [] TE   [] TA   [] neuro   [] other: Patient Education: [x] Review HEP    [] Progressed/Changed HEP based on:   [] positioning   [] body mechanics   [] transfers   [] heat/ice application    [] other:      Other Objective/Functional Measures: NA     Pain Level (0-10 scale) post treatment: 6    ASSESSMENT/Changes in Function: Patient responds well to treatment session. Added trial of occipital release and pectoral release to address associated cervical and pectoral pain. Instructed patient in how to apply self treatment to these areas. No adverse effects were noted from today's treatment session.      Patient will continue to benefit from skilled PT services to modify and progress therapeutic interventions, address functional mobility deficits, address ROM deficits, address strength deficits, analyze and address soft tissue restrictions, analyze and cue movement patterns, analyze and modify body mechanics/ergonomics, assess and modify postural abnormalities,  and instruct in home and community integration to attain remaining goals. []  See Plan of Care  []  See progress note/recertification  []  See Discharge Summary         Progress towards goals / Updated goals:  Short Term Goals: To be accomplished in 4 weeks:                   QEAEZTX will report compliance with HEP at least 1x/day to aid in rehabilitation program.                   Status at IE: Reviewed patient current HEP and provided recommendations for modifications.                   Current: Patient states she is working on HEP frequently at home.  9/24/19                      Patient will display increase in left shoulder flexion and abduction AROM to 75 degrees  to aid in completion of ADLs.                  Status at IE: flexion 45, abduction 21                   Current: flexion 55, abduction 24  10/8/19                   Current: flexion 58, abduction 30, unchanged from 10/15/19,  10/25/19     Long Term Goals: To be accomplished in 12 weeks:                   Patient will report compliance with HEP a least 3-4x/week to aid in rehabilitation/strengthening program.                   Status at IE: Reviewed patient current HEP and provided recommendations for modifications.                   Current: Patient reports doing the HEP frequently throughout the day every day.  10/15/19                      Patient will report no pain greater than 3-4/10 with overhead activities to aid in completion of ADLs.                  Status at IE: pain left shoulder 5-10/10.                   Current: Left UE pain persisting in 6-8/10. 10/15/19                      Patient will increase left shoulder flexion and abduction strength to 3+/5  to aid in completion of ADLs.                    Status at IE: left shoulder abduction and flexion 1/5                   Current: left shoulder flexion and abduction strength slight improvement to 1+/5  10/15/19                      Patient will increase FOTO score to 53 points overall to demonstrate improvement in functional status.                     Status at IE: FOTO 36                   Current: FOTO 32 slight regression   10/15/19    PLAN  []  Upgrade activities as tolerated     [x]  Continue plan of care  []  Update interventions per flow sheet       []  Discharge due to:_  []  Other:_      Ximena Tripp, PT, DPT 10/25/2019  8:56 AM    Future Appointments   Date Time Provider Casey Morales   10/25/2019  9:00 AM Huy Duval, PT MIHPTVBRITTNEE THE [facility]   10/29/2019  9:30 AM THE [facility] NM DOSE/ PREP RM 1 MIHRNM THE [facility]   10/29/2019 10:30 AM [address] CV SERV MIHNINV THE [facility]   10/29/2019  1:00 PM [address] CV SERV [address] THE [facility]   11/1/2019 10:00 AM Joesph Krishnan, PT MIHPTVBRITTNEE THE [facility]   11/8/2019 10:00 AM Huy Duval, PT MIHPTELISHA THE [facility]   11/13/2019 11:00 AM Tommie Ards, NP NSFP None

## 2019-10-28 ENCOUNTER — TELEPHONE (OUTPATIENT)
Dept: CARDIOLOGY CLINIC | Age: 54
End: 2019-10-28

## 2019-10-28 NOTE — TELEPHONE ENCOUNTER
Patient's stress test was denied. Need to schedule peer to peer for physician to speak with insurance.  Stress test is scheduled for tomorrow     Tracking #: [id number]  Phone #: [phone]

## 2019-10-29 ENCOUNTER — HOSPITAL ENCOUNTER (OUTPATIENT)
Dept: NON INVASIVE DIAGNOSTICS | Age: 54
Discharge: HOME | End: 2019-10-29
Attending: INTERNAL MEDICINE
Payer: MEDICAID

## 2019-10-29 ENCOUNTER — APPOINTMENT (OUTPATIENT)
Dept: PHYSICAL THERAPY | Age: 54
End: 2019-10-29
Payer: MEDICAID

## 2019-10-29 ENCOUNTER — HOSPITAL ENCOUNTER (OUTPATIENT)
Dept: NUCLEAR MEDICINE | Age: 54
Discharge: HOME | End: 2019-10-29
Attending: INTERNAL MEDICINE
Payer: MEDICAID

## 2019-10-29 VITALS
BODY MASS INDEX: 31.07 KG/M2 | SYSTOLIC BLOOD PRESSURE: 172 MMHG | HEIGHT: 64 IN | DIASTOLIC BLOOD PRESSURE: 116 MMHG | WEIGHT: 182 LBS

## 2019-10-29 DIAGNOSIS — R07.9 CHEST PAIN, UNSPECIFIED TYPE: ICD-10-CM

## 2019-10-29 LAB
AV VELOCITY RATIO: 0.89
AV VTI RATIO: 0.8
ECHO AO ASC DIAM: 2.49 CM
ECHO AV AREA PEAK VELOCITY: 2.8 CM2
ECHO AV AREA VTI: 2.4 CM2
ECHO AV AREA/BSA PEAK VELOCITY: 1.5 CM2/M2
ECHO AV AREA/BSA VTI: 1.2 CM2/M2
ECHO AV MEAN GRADIENT: 2.9 MMHG
ECHO AV MEAN VELOCITY: 0.82 M/S
ECHO AV PEAK GRADIENT: 4.5 MMHG
ECHO AV PEAK VELOCITY: 105.92 CM/S
ECHO AV VTI: 25.59 CM
ECHO IVC PROX: 1.5 CM
ECHO LA AREA 2C: 22.34 CM2
ECHO LA AREA 4C: 15.2 CM2
ECHO LA MAJOR AXIS: 3.59 CM
ECHO LA VOL 2C: 83.28 ML (ref 22–52)
ECHO LA VOL 4C: 39.39 ML (ref 22–52)
ECHO LA VOL BP: 66.93 ML (ref 22–52)
ECHO LA VOLUME INDEX A2C: 44.32 ML/M2 (ref 16–28)
ECHO LA VOLUME INDEX A4C: 20.96 ML/M2 (ref 16–28)
ECHO LV EDV A2C: 88.9 ML
ECHO LV EDV A4C: 75.2 ML
ECHO LV EDV BP: 83.1 ML (ref 56–104)
ECHO LV EDV INDEX A4C: 40 ML/M2
ECHO LV EDV INDEX BP: 44.2 ML/M2
ECHO LV EDV NDEX A2C: 47.3 ML/M2
ECHO LV EDV TEICHHOLZ: 0.53 ML
ECHO LV EJECTION FRACTION A2C: 79 %
ECHO LV EJECTION FRACTION A4C: 57 %
ECHO LV EJECTION FRACTION BIPLANE: 70.4 % (ref 55–100)
ECHO LV ESV A2C: 19.1 ML
ECHO LV ESV A4C: 32.1 ML
ECHO LV ESV BP: 24.6 ML (ref 19–49)
ECHO LV ESV INDEX A2C: 10.2 ML/M2
ECHO LV ESV INDEX A4C: 17.1 ML/M2
ECHO LV ESV INDEX BP: 13.1 ML/M2
ECHO LV ESV TEICHHOLZ: 0.2 ML
ECHO LV INTERNAL DIMENSION DIASTOLIC: 4.38 CM (ref 3.9–5.3)
ECHO LV INTERNAL DIMENSION SYSTOLIC: 2.91 CM
ECHO LV IVSD: 1.17 CM (ref 0.6–0.9)
ECHO LV MASS 2D: 188.2 G (ref 67–162)
ECHO LV MASS INDEX 2D: 100.2 G/M2 (ref 43–95)
ECHO LV POSTERIOR WALL DIASTOLIC: 0.97 CM (ref 0.6–0.9)
ECHO LV POSTERIOR WALL SYSTOLIC: 0 CM
ECHO LVOT DIAM: 2.01 CM
ECHO LVOT PEAK GRADIENT: 3.5 MMHG
ECHO LVOT PEAK VELOCITY: 94.07 CM/S
ECHO LVOT VTI: 19.38 CM
ECHO MV A VELOCITY: 52.53 CM/S
ECHO MV AREA PHT: 2 CM2
ECHO MV E DECELERATION TIME (DT): 372.5 MS
ECHO MV E VELOCITY: 50.91 CM/S
ECHO MV E/A RATIO: 0.97
ECHO MV PRESSURE HALF TIME (PHT): 108 MS
ECHO PULMONARY ARTERY SYSTOLIC PRESSURE (PASP): 20 MMHG
ECHO RV INTERNAL DIMENSION: 3.3 CM
ECHO TV REGURGITANT MAX VELOCITY: 1.93 CM/S
ECHO TV REGURGITANT PEAK GRADIENT: 14.9 MMHG
LVFS 2D: 33.56 %
LVOT MG: 1.7 MMHG
LVOT MV: 0.61 CM/S
LVSV (MOD BI): 30.31 ML
LVSV (MOD SINGLE 4C): 22.34 ML
LVSV (MOD SINGLE): 36.13 ML
LVSV (TEICH): 28.14 ML
MV DEC SLOPE: 1.37
PV END DIASTOLIC VELOCITY: 1.2 MMHG

## 2019-10-29 PROCEDURE — 93017 CV STRESS TEST TRACING ONLY: CPT

## 2019-10-29 PROCEDURE — A9500 TC99M SESTAMIBI: HCPCS

## 2019-10-29 PROCEDURE — 93306 TTE W/DOPPLER COMPLETE: CPT

## 2019-10-29 PROCEDURE — 74011250636 HC RX REV CODE- 250/636: Performed by: INTERNAL MEDICINE

## 2019-10-29 RX ADMIN — REGADENOSON 0.4 MG: 0.08 INJECTION, SOLUTION INTRAVENOUS at 15:51

## 2019-11-01 ENCOUNTER — HOSPITAL ENCOUNTER (OUTPATIENT)
Dept: PHYSICAL THERAPY | Age: 54
Discharge: HOME | End: 2019-11-01
Payer: MEDICAID

## 2019-11-01 PROCEDURE — 97110 THERAPEUTIC EXERCISES: CPT

## 2019-11-01 NOTE — PROGRESS NOTES
PT DAILY TREATMENT NOTE    Patient Name: Terry Pope  Date:2019  : 1965  [x]  Patient  Verified  Payor: [facility] MEDICAID / Plan: Aprilkgcamilla 46 / Product Type: Managed Care Medicaid /    In time: 2616  Out time: 4275  Total Treatment Time (min): 39  Total Timed Codes (min): 39  1:1 Treatment Time ( only): 44   Visit #: 10 of 12    Treatment Area: Left shoulder pain [M25.512]    SUBJECTIVE  Pain Level (0-10 scale): 8  Any medication changes, allergies to medications, adverse drug reactions, diagnosis change, or new procedure performed?: [x] No    [] Yes (see summary sheet for update)  Subjective functional status/changes:   [] No changes reported  \"Today is one of my bad days. The pain is higher and I didn't get any sleep last night. I am still trying to push through the pain, do the exercises, and keep active. \"    OBJECTIVE    39 min Therapeutic Exercise:  [x] See flow sheet :   Rationale: increase ROM, increase strength and decrease pain to improve the patients ability to complete ADLs    With   [] TE   [] TA   [] neuro   [] other: Patient Education: [x] Review HEP    [] Progressed/Changed HEP based on:   [] positioning   [] body mechanics   [] transfers   [] heat/ice application    [] other:      Other Objective/Functional Measures: NA     Pain Level (0-10 scale) post treatment: 8    ASSESSMENT/Changes in Function: Patient with fair tolerance for gentle exs. Continuing to try different exercises, positions, and manual techniques for symptome reduction and increasing AROM, with minimal results. No adverse effects were noted from today's treatment session.      Patient will continue to benefit from skilled PT services to modify and progress therapeutic interventions, address functional mobility deficits, address ROM deficits, address strength deficits, analyze and address soft tissue restrictions, analyze and cue movement patterns, analyze and modify body mechanics/ergonomics, assess and modify postural abnormalities,  and instruct in home and community integration to attain remaining goals. []  See Plan of Care  []  See progress note/recertification  []  See Discharge Summary         Progress towards goals / Updated goals:  Short Term Goals: To be accomplished in 4 weeks:                   XXOJLWZ will report compliance with HEP at least 1x/day to aid in rehabilitation program.                   Status at IE: Reviewed patient current HEP and provided recommendations for modifications.                   Current: Patient states she is working on HEP frequently at home.  9/24/19                      Patient will display increase in left shoulder flexion and abduction AROM to 75 degrees  to aid in completion of ADLs.                  Status at IE: flexion 45, abduction 21                   Current: flexion 55, abduction 24  10/8/19                   Current: flexion 58, abduction 38,  11/1/19     Long Term Goals: To be accomplished in 12 weeks:                   Patient will report compliance with HEP a least 3-4x/week to aid in rehabilitation/strengthening program.                   Status at IE: Reviewed patient current HEP and provided recommendations for modifications.                   Current: Patient reports doing the HEP frequently throughout the day every day.  10/15/19                      Patient will report no pain greater than 3-4/10 with overhead activities to aid in completion of ADLs.                  Status at IE: pain left shoulder 5-10/10.                   Current: Left UE pain persisting in 6-8/10. 10/15/19                      Patient will increase left shoulder flexion and abduction strength to 3+/5  to aid in completion of ADLs.                    Status at IE: left shoulder abduction and flexion 1/5                   Current: left shoulder flexion and abduction strength slight improvement to 1+/5  10/15/19                      Patient will increase FOTO score to 53 points overall to demonstrate improvement in functional status.                     Status at IE: FOTO 36                   Current: FOTO 32 slight regression   10/15/19    PLAN  []  Upgrade activities as tolerated     [x]  Continue plan of care  []  Update interventions per flow sheet       []  Discharge due to:_  []  Other:_      Paula Romano PT, DPT 11/1/2019  10:14 AM    Future Appointments   Date Time Provider Casey Morales   11/8/2019 10:00 AM EMEKA Benavides THE [facility]   11/12/2019  9:30 AM EMEKA Benavides THE [facility]   11/13/2019 11:00 AM Olaf Dior, NP NS[facility] None

## 2019-11-05 NOTE — PROGRESS NOTES
Per your last note'  Impression and Plan:  Tony Sanchez is a 49 y.o. with:     1.) Atypical but persistent CP  2.) HTN  3.) +FH premature CAD  4.) MSK shoulder pain, very well could be contributing/ cause of #1     1.) Pharm nuc  2.) Echo  3.) If testing benign/ negative can follow up with me prn, and likely cause is referred MSK pain from shoulder     Pt was concerned about her ability to balance and hold on treadmill due to her MSK pain/ shoulder problems.  Her symptoms are atypical but so persistent- that in the setting of her risk factors we decided ischemic evaluation would help give her reassurance.     Thank you for allowing me to participate in the care of your patient, please do not hesitate to call with questions or concerns.     Kindest Regards,     Vincenzo Lanier, DO

## 2019-11-05 NOTE — PROGRESS NOTES
Per last note; Impression and Plan:  Cole Nelson is a 49 y.o. with:     1.) Atypical but persistent CP  2.) HTN  3.) +FH premature CAD  4.) MSK shoulder pain, very well could be contributing/ cause of #1     1.) Pharm nuc  2.) Echo  3.) If testing benign/ negative can follow up with me prn, and likely cause is referred MSK pain from shoulder     Pt was concerned about her ability to balance and hold on treadmill due to her MSK pain/ shoulder problems.  Her symptoms are atypical but so persistent- that in the setting of her risk factors we decided ischemic evaluation would help give her reassurance.     Thank you for allowing me to participate in the care of your patient, please do not hesitate to call with questions or concerns.     Kindest Regards,     Elenita Luevano, DO

## 2019-11-08 ENCOUNTER — HOSPITAL ENCOUNTER (OUTPATIENT)
Dept: PHYSICAL THERAPY | Age: 54
Discharge: HOME | End: 2019-11-08
Payer: MEDICAID

## 2019-11-08 ENCOUNTER — TELEPHONE (OUTPATIENT)
Dept: CARDIOLOGY CLINIC | Age: 54
End: 2019-11-08

## 2019-11-08 PROCEDURE — 97110 THERAPEUTIC EXERCISES: CPT

## 2019-11-08 NOTE — TELEPHONE ENCOUNTER
----- Message from Coy Bustos DO sent at 11/8/2019 12:57 PM EST -----  Regarding: FW: echocardiogram + nuc  I called this pt to discuss testing results, left vm on cell phone listed to call us back. Her echo is normal/ EF normal.  She had a small perfusion defect on her nuc- so I was calling to discuss options moving forward. Overall her symptoms are atypical but if she's still having CP can offer further recommendations.    ----- Message -----  From: Ramesh Gordon DO  Sent: 11/8/2019  11:37 AM EST  To: Coy Bustos DO, Melvenia Citizen, LPN  Subject: echocardiogram                                   Edlilah,  This is your patient. Ed    ----- Message -----  From: Karen Ramirez LPN  Sent: 99/8/2804   2:18 PM EST  To: Calvin Richey DO    Per your last note'  Impression and Plan:  Shivani Villafuerte is a 49 y.o. with:     1.) Atypical but persistent CP  2.) HTN  3.) +FH premature CAD  4.) MSK shoulder pain, very well could be contributing/ cause of #1     1.) Pharm nuc  2.) Echo  3.) If testing benign/ negative can follow up with me prn, and likely cause is referred MSK pain from shoulder     Pt was concerned about her ability to balance and hold on treadmill due to her MSK pain/ shoulder problems.  Her symptoms are atypical but so persistent- that in the setting of her risk factors we decided ischemic evaluation would help give her reassurance.     Thank you for allowing me to participate in the care of your patient, please do not hesitate to call with questions or concerns.     Kindest Regards,     Coy Bustos DO

## 2019-11-08 NOTE — TELEPHONE ENCOUNTER
Called patient, verified by two identifiers, name and . Patient notified of echo and nuc results. She reports having CP only a few times a week lasting a few minutes. All questions answered at time of phone call. Patient was made a follow up appt her Dr. aJm Reid request just to re evaluate her symptoms.

## 2019-11-08 NOTE — PROGRESS NOTES
PT DAILY TREATMENT NOTE    Patient Name: Ralph Ascencio  Date:2019  : 1965  [x]  Patient  Verified  Payor: [facility] MEDICAID / Plan: Zeke 46 / Product Type: Managed Care Medicaid /    In time: 930  Out time: 1028  Total Treatment Time (min): 38  Total Timed Codes (min): 38  1:1 Treatment Time ( only): 38   Visit #: 11 of 12    Treatment Area: Left shoulder pain [M25.512]    SUBJECTIVE  Pain Level (0-10 scale): 7  Any medication changes, allergies to medications, adverse drug reactions, diagnosis change, or new procedure performed?: [x] No    [] Yes (see summary sheet for update)  Subjective functional status/changes:   [] No changes reported  \"I was able to get an appointment with a physician that specializes in CRPS, but I won't have the visit until . \"    OBJECTIVE    38 min Therapeutic Exercise:  [x] See flow sheet :   Rationale: increase ROM, increase strength and decrease pain to improve the patients ability to complete ADLs    With   [] TE   [] TA   [] neuro   [] other: Patient Education: [x] Review HEP    [] Progressed/Changed HEP based on:   [] positioning   [] body mechanics   [] transfers   [] heat/ice application    [] other:      Other Objective/Functional Measures: NA     Pain Level (0-10 scale) post treatment: 8    ASSESSMENT/Changes in Function: Patient responds well to treatment session. Patient progressing in independence with left UE comprehensive HEP and will soon be ready for discharge. . No adverse effects were noted from today's treatment session.      Patient will continue to benefit from skilled PT services to modify and progress therapeutic interventions, address functional mobility deficits, address ROM deficits, address strength deficits, analyze and address soft tissue restrictions, analyze and cue movement patterns, analyze and modify body mechanics/ergonomics, assess and modify postural abnormalities,  and instruct in home and community integration to attain remaining goals. []  See Plan of Care  []  See progress note/recertification  []  See Discharge Summary         Progress towards goals / Updated goals:  Short Term Goals: To be accomplished in 4 weeks:                   VIRAJ will report compliance with HEP at least 1x/day to aid in rehabilitation program.                   Status at IE: Reviewed patient current HEP and provided recommendations for modifications.                   Current: Patient states she is working on HEP frequently at home.  9/24/19                      Patient will display increase in left shoulder flexion and abduction AROM to 75 degrees  to aid in completion of ADLs.                  Status at IE: flexion 45, abduction 21                   Current: flexion 55, abduction 24  10/8/19                   Current: flexion 58, abduction 30, unchanged from 10/15/19,  10/25/19     Long Term Goals: To be accomplished in 12 weeks:                   Patient will report compliance with HEP a least 3-4x/week to aid in rehabilitation/strengthening program.                   Status at IE: Reviewed patient current HEP and provided recommendations for modifications.                   Current: Patient reports doing the HEP frequently throughout the day every day.  10/15/19                      Patient will report no pain greater than 3-4/10 with overhead activities to aid in completion of ADLs.                  Status at IE: pain left shoulder 5-10/10.                   Current: Left UE pain persisting in 6-8/10. 11/8/19, not progressing                      Patient will increase left shoulder flexion and abduction strength to 3+/5  to aid in completion of ADLs.                    Status at IE: left shoulder abduction and flexion 1/5                   Current: left shoulder flexion and abduction strength slight improvement to 1+/5  10/15/19                      Patient will increase FOTO score to 53 points overall to demonstrate improvement in functional status.                     Status at IE: FOTO 36                   Current: FOTO 32 slight regression   10/15/19       PLAN  []  Upgrade activities as tolerated     [x]  Continue plan of care  []  Update interventions per flow sheet       []  Discharge due to:_  []  Other:_      Baljit Estrella, PT, DPT 11/8/2019  10:01 AM    Future Appointments   Date Time Provider Casey Morales   11/12/2019  9:30 AM Natalee Ramos PT [id number]   11/13/2019 11:00 AM Jamila Red, NP [facility] None

## 2019-11-12 ENCOUNTER — HOSPITAL ENCOUNTER (OUTPATIENT)
Dept: PHYSICAL THERAPY | Age: 54
Discharge: HOME | End: 2019-11-12
Payer: MEDICAID

## 2019-11-12 PROCEDURE — 97110 THERAPEUTIC EXERCISES: CPT

## 2019-11-12 NOTE — PROGRESS NOTES
PT DAILY TREATMENT NOTE    Patient Name: Aamir Herrera  Date:2019  : 1965  [x]  Patient  Verified  Payor: [facility] MEDICAID / Plan: Zeke 46 / Product Type: Managed Care Medicaid /    In time: 929  Out time: 1004  Total Treatment Time (min): 34  Total Timed Codes (min): 34  1:1 Treatment Time ( only): 34   Visit #: 12 of 12    Treatment Area: Left shoulder pain [M25.512]    SUBJECTIVE  Pain Level (0-10 scale): 8  Any medication changes, allergies to medications, adverse drug reactions, diagnosis change, or new procedure performed?: [x] No    [] Yes (see summary sheet for update)  Subjective functional status/changes:   [] No changes reported  \"Today is a really bad day. The pain is causing more sensitivity and it is hard to tolerate. I am seeing the pain specialist on the  or . I am hoping they will be able to provide some guidance. \"    OBJECTIVE    34 min Therapeutic Exercise:  [x] See flow sheet :   Rationale: increase ROM, increase strength and decrease pain to improve the patients ability to complete ADLs     With   [] TE   [] TA   [] neuro   [] other: Patient Education: [x] Review HEP    [] Progressed/Changed HEP based on:   [] positioning   [] body mechanics   [] transfers   [] heat/ice application    [] other:      Other Objective/Functional Measures: NA     Pain Level (0-10 scale) post treatment: 8    ASSESSMENT/Changes in Function: Patient has been very well motivated, cooperative and consistent with all Physical Therapy exercises and instructions. Slight improvements in left UE AROM and strength are noted. But, no reductions in severity of pain intensity have been noted. Patient is discharged from PT due to insufficient progress with PT interventions alone.  Patient may benefit from coordination of PT within a more comprehensive approach (e.g consultation with CRPS/chronic pain specialist, nerve blocks, behavioral modification/cousneling) to reduce disabling pain and improve function. []  See Plan of Care  []  See progress note/recertification  [x]  See Discharge Summary         Progress towards goals / Updated goals:  Short Term Goals: To be accomplished in 4 weeks:                   LJWTHLF will report compliance with HEP at least 1x/day to aid in rehabilitation program.                   Status at IE: Reviewed patient current HEP and provided recommendations for modifications.                   Current: Patient is fully independent in comprehensive HEP. 11/12/19  MET                      Patient will display increase in left shoulder flexion and abduction AROM to 75 degrees  to aid in completion of ADLs.                  Status at IE: flexion 45, abduction 21                   Current: flexion 55, abduction 24  10/8/19                   Current: flexion 58, abduction 30, unchanged from 10/15/19,  11/12/19  Not Progressing     Long Term Goals: To be accomplished in 12 weeks:                   Patient will report compliance with HEP a least 3-4x/week to aid in rehabilitation/strengthening program.                   Status at IE: Reviewed patient current HEP and provided recommendations for modifications.                   Current: Patient is fully independent in comprehensive HEP. 11/12/19  MET                      Patient will report no pain greater than 3-4/10 with overhead activities to aid in completion of ADLs.                  Status at IE: pain left shoulder 5-10/10.                   Current: Left UE pain persisting in 6-8/10. 11/12/19  Not Progressing                      Patient will increase left shoulder flexion and abduction strength to 3+/5  to aid in completion of ADLs.                    Status at IE: left shoulder abduction and flexion 1/5                   Current: left shoulder flexion and abduction strength slight improvement to 2/5  11/12/19 Slight Improvement                      Patient will increase FOTO score to 53 points overall to demonstrate improvement in functional status.                  Status at IE: FOTO 36                   Current: FOTO 32 slight regression   10/15/19    PLAN  []  Upgrade activities as tolerated     []  Continue plan of care  []  Update interventions per flow sheet       [x]  Discharge due to: Patient not progressing, further evaluation by chronic pain specialist is warranted.   []  Other:_      Ann Marie Thorpe, PT, DPT 11/12/2019  9:45 AM    Future Appointments   Date Time Provider Casey Morales   11/13/2019 11:00 AM Shira Reina NP [facility] None   11/19/2019 10:10 AM MD Ricardo Leiva 69   11/29/2019  2:40 PM Abi Quintana  [facility]

## 2019-11-12 NOTE — PROGRESS NOTES
[facility] at [address] [place] [address] Drive: [phone]   Fax: [phone]  Discharge Summary  Patient Name: Kev Bull : 1965   Medical   Diagnosis: Left shoulder pain [M25.512] Treatment Diagnosis: Left Shoulder Pain   Onset Date:      Referral Source: Vikki Beaver NP Start of Care [facility]): 19   Prior Hospitalization: See medical history Provider #: [id number]   Prior Level of Function: working full time with no pain or restrictions left shoulder   Comorbidities: HTN, depression   Medications: Verified on Patient Summary List      ===========================================================================================  Assessment / Summary of Care:  Kev Bull is a 49 y.o.  female who  has been very well motivated, cooperative and consistent with all Physical Therapy exercises and instructions. Slight improvements in left UE AROM and strength are noted. But, no reductions in severity of pain intensity have been noted. Patient is discharged from PT due to insufficient progress with PT interventions alone. Patient may benefit from coordination of PT within a more comprehensive approach (e.g consultation with CRPS/chronic pain specialist, nerve blocks, behavioral modification/cousneling) to reduce disabling pain and improve function.    ===========================================================================================    Plan: Discharge to independent HEP.      Goals:   Short Term Goals: To be accomplished in 4 weeks:                   BARRYJAMY will report compliance with HEP at least 1x/day to aid in rehabilitation program.                   Status at IE: Reviewed patient current HEP and provided recommendations for modifications.                   Current: Patient is fully independent in comprehensive HEP. 19  MET                      Patient will display increase in left shoulder flexion and abduction AROM to 75 degrees  to aid in completion of ADLs.                  Status at IE: flexion 45, abduction 21                   Current: flexion 55, abduction 24  10/8/19                   Current: flexion 58, abduction 30, unchanged from 10/15/19,  11/12/19  Not Progressing     Long Term Goals: To be accomplished in 12 weeks:                   Patient will report compliance with HEP a least 3-4x/week to aid in rehabilitation/strengthening program.                   Status at IE: Reviewed patient current HEP and provided recommendations for modifications.                   Current: Patient is fully independent in comprehensive HEP. 11/12/19  MET                      Patient will report no pain greater than 3-4/10 with overhead activities to aid in completion of ADLs.                  Status at IE: pain left shoulder 5-10/10.                   Current: Left UE pain persisting in 6-8/10. 11/12/19  Not Progressing                       Patient will increase left shoulder flexion and abduction strength to 3+/5  to aid in completion of ADLs.                  Status at IE: left shoulder abduction and flexion 1/5                   Current: left shoulder flexion and abduction strength slight improvement to 2/5  11/12/19 Slight Improvement                      Patient will increase FOTO score to 53 points overall to demonstrate improvement in functional status.                  Status at IE: FOTO 36                   Current: FOTO 32 slight regression   10/15/19    ===========================================================================================  Subjective:  Patient reports: \"I have an appointment with a CRPS specialist late next week. I will keep doing the exercises on my own. \"      Therapist Signature: Melisa Blackwood PT, DPT Date: 11/12/2019     Time: 10:07 AM     NOTE TO PHYSICIAN:  Please complete the following and fax to:    [facility] at THE [facility] at [phone]  If you are unable to process this request in   24 hours, please contact our office.        [de-identified] Signature:____________________ Date:_________ TIME:________    Shyann Cole, Date and Time must be completed for valid certification **

## 2019-11-13 ENCOUNTER — PATIENT OUTREACH (OUTPATIENT)
Dept: FAMILY MEDICINE CLINIC | Age: 54
End: 2019-11-13

## 2019-11-13 NOTE — PROGRESS NOTES
[facility] health screening:    Negative pap and colonoscopy are now updated in . As stated before Ms Rama Pathak declines to return my phone calls to f/u on mammogram. Closed episode of care.

## 2019-11-19 ENCOUNTER — OFFICE VISIT (OUTPATIENT)
Dept: ORTHOPEDIC SURGERY | Age: 54
End: 2019-11-19

## 2019-11-19 VITALS
BODY MASS INDEX: 32.64 KG/M2 | RESPIRATION RATE: 14 BRPM | HEART RATE: 91 BPM | WEIGHT: 191.2 LBS | SYSTOLIC BLOOD PRESSURE: 168 MMHG | HEIGHT: 64 IN | TEMPERATURE: 98.2 F | DIASTOLIC BLOOD PRESSURE: 113 MMHG | OXYGEN SATURATION: 87 %

## 2019-11-19 DIAGNOSIS — M75.02 ADHESIVE CAPSULITIS OF LEFT SHOULDER: Primary | ICD-10-CM

## 2019-11-19 NOTE — PROGRESS NOTES
Ralph Ascencio  1965   Chief Complaint   Patient presents with    Shoulder Pain     left shoulder pain        HISTORY OF PRESENT ILLNESS  Ralph Ascencio is a 49 y.o. female who presents today for evaluation of left shoulder pain. She rates her pain 8/10 today. Pain has been present since 2017 after picking up a heavy object while at work. Pt has hx of a left rotator cuff tear and had rotator cuff surgery in 2017 by Dr. Joshua Cortez. Pt reports she had another surgery in 2017 due to adhesive capsulitis. She reports she still has limited ROM and pain. Pt is currently in pain management. Pt has been going to PT but still has significantly decreased mobility. Patient describes the pain as aching and sharp that is Constant in nature. Symptoms are worse with certain movements of the arm, stretching, Activity and is better with  Rest, Heat. Associated symptoms include tightness. Since problem started, it: is unchanged. Pain does not wake patient up at night. Has taken no meds for the problem. Has tried following treatments: Injections:NO; Brace:NO; Therapy:YES; Cane/Crutch:NO       Allergies   Allergen Reactions    Prednisone Other (comments)     Coughs up blood          History reviewed. No pertinent past medical history.    Social History     Socioeconomic History    Marital status: UNKNOWN     Spouse name: Not on file    Number of children: Not on file    Years of education: Not on file    Highest education level: Not on file   Occupational History    Not on file   Social Needs    Financial resource strain: Not on file    Food insecurity:     Worry: Not on file     Inability: Not on file    Transportation needs:     Medical: Not on file     Non-medical: Not on file   Tobacco Use    Smoking status: Never Smoker    Smokeless tobacco: Never Used   Substance and Sexual Activity    Alcohol use: No     Frequency: Never    Drug use: No    Sexual activity: Yes     Partners: Male   Lifestyle    Physical activity:     Days per week: Not on file     Minutes per session: Not on file    Stress: Not on file   Relationships    Social connections:     Talks on phone: Not on file     Gets together: Not on file     Attends Congregational service: Not on file     Active member of club or organization: Not on file     Attends meetings of clubs or organizations: Not on file     Relationship status: Not on file    Intimate partner violence:     Fear of current or ex partner: Not on file     Emotionally abused: Not on file     Physically abused: Not on file     Forced sexual activity: Not on file   Other Topics Concern    Not on file   Social History Narrative    Not on file      Past Surgical History:   Procedure Laterality Date    HX SHOULDER ARTHROSCOPY Left 2017 and oct 2018      Family History   Problem Relation Age of Onset    Diabetes Mother     Heart Attack Mother     Diabetes Father     Lupus Sister     Depression Other     Depression Paternal Uncle       Current Outpatient Medications   Medication Sig    gabapentin (NEURONTIN) 300 mg capsule TAKE 1 CAPSULE BY MOUTH AT DINNER TIME AND 1 CAPSULE BY MOUTH AT BEDTIME    DULoxetine (CYMBALTA) 60 mg capsule duloxetine 60 mg capsule,delayed release   TAKE 1 CAPSULE(S) EVERY DAY BY ORAL ROUTE FOR 15 DAYS.  celecoxib (CELEBREX) 200 mg capsule Take 200 mg by mouth.  cyclobenzaprine (FLEXERIL) 5 mg tablet Take 1 Tab by mouth three (3) times daily as needed for Muscle Spasm(s).  escitalopram oxalate (LEXAPRO) 20 mg tablet Take 1 Tab by mouth daily.  amLODIPine (NORVASC) 10 mg tablet Take 1 Tab by mouth daily. No current facility-administered medications for this visit. REVIEW OF SYSTEM   Patient denies: Weight loss, Fever/Chills, HA, Visual changes, Fatigue, Chest pain, SOB, Abdominal pain, N/V/D/C, Blood in stool or urine, Edema. Pertinent positive as above in HPI.  All others were negative    PHYSICAL EXAM:   Visit Vitals  BP (!) 168/113   Pulse 91   Temp 98.2 °F (36.8 °C) (Oral)   Resp 14   Ht 5' 4\" (1.626 m)   Wt 191 lb 3.2 oz (86.7 kg)   SpO2 (!) 87%   BMI 32.82 kg/m²     The patient is a well-developed, well-nourished female   in no acute distress. The patient is alert and oriented times three. The patient is alert and oriented times three. Mood and affect are normal.  LYMPHATIC: lymph nodes are not enlarged and are within normal limits  SKIN: normal in color and non tender to palpation. There are no bruises or abrasions noted. NEUROLOGICAL: Motor sensory exam is within normal limits. Reflexes are equal bilaterally. There is normal sensation to pinprick and light touch  MUSCULOSKELETAL:  Examination Left shoulder   Skin Intact   AC joint tenderness -   Biceps tenderness -   Forward flexion/Elevation    Active abduction    Glenohumeral abduction 90   External rotation ROM 90   Internal rotation ROM 70   Apprehension -   Cas Relocation -   Jerk -   Load and Shift -   Obriens -   Speeds -   Impingement sign -   Supraspinatus/Empty Can -, 5/5   External Rotation Strength -, 5/5   Lift Off/Belly Press -, 5/5   Neurovascular Intact       IMAGING: MRI of left shoulder with contrast dated 1/28/19 was reviewed and read by Dr. Ponce Vasquez:   IMPRESSION:  1. Motion limited examination of the left shoulder. 2. Within the limitations of motion artifact, no discrete rotator cuff tendon  tear is demonstrated. Although no discrete tendon tear is demonstrated,  arthrographic contrast is present within the subacromial-subdeltoid bursa. This  finding may reflect sequela of prior cuff repair, which can be a normal finding  relating to postoperative cuff permeability. Correlation with surgical history  recommended. Normal rotator cuff muscle bulk and signal  3. Prior biceps tenodesis and likely acromioplasty with residual type I acromial  Morphology.     XR of left shoulder dated 10/17/18 was reviewed and read by Dr. Ponce Vasquez: No acute fracture or dislocation. Joint spaces are preserved. Osseous changes of prior biceps tenodesis. IMPRESSION:      ICD-10-CM ICD-9-CM    1. Adhesive capsulitis of left shoulder M75.02 726.0         PLAN:  1. Pt presents today with left shoulder pain due to adhesive capsulitis. Due to her history of multiple surgeries on her left shoulder, I would like to refer her back to Dr. Orvil Cogan for further treatment. I also advised her that seeking treatment at a more specialized surgical center such as [facility] [place] another option. Risk factors include: BMI>30  2. No ultrasound exam indicated today  3. No cortisone injection indicated today   4. No Physical/Occupational Therapy indicated today  5. No diagnostic test indicated today:   6. No durable medical equipment indicated today  7. No referral indicated today   8. No medications indicated today:   9. No Narcotic indicated today       RTC prn      Scribed by Mary Viveros) as dictated by Charu Gross MD    I, Dr. Charu Gross, confirm that all documentation is accurate.     Charu Gross M.D.   [facility]

## 2019-11-19 NOTE — PROGRESS NOTES
1. Have you been to the ER, urgent care clinic since your last visit? Hospitalized since your last visit? No    2. Have you seen or consulted any other health care providers outside of the [address] [place] [address] since your last visit? Include any pap smears or colon screening.  No

## 2019-11-26 ENCOUNTER — HOSPITAL ENCOUNTER (OUTPATIENT)
Dept: LAB | Age: 54
Discharge: HOME | End: 2019-11-26
Payer: MEDICAID

## 2019-11-26 ENCOUNTER — OFFICE VISIT (OUTPATIENT)
Dept: FAMILY MEDICINE CLINIC | Age: 54
End: 2019-11-26

## 2019-11-26 VITALS
WEIGHT: 191 LBS | TEMPERATURE: 97.5 F | OXYGEN SATURATION: 97 % | SYSTOLIC BLOOD PRESSURE: 142 MMHG | DIASTOLIC BLOOD PRESSURE: 92 MMHG | BODY MASS INDEX: 32.61 KG/M2 | HEART RATE: 83 BPM | HEIGHT: 64 IN | RESPIRATION RATE: 16 BRPM

## 2019-11-26 DIAGNOSIS — E78.2 MIXED HYPERLIPIDEMIA: ICD-10-CM

## 2019-11-26 DIAGNOSIS — I10 ESSENTIAL HYPERTENSION: ICD-10-CM

## 2019-11-26 DIAGNOSIS — M25.512 CHRONIC LEFT SHOULDER PAIN: ICD-10-CM

## 2019-11-26 DIAGNOSIS — D50.8 OTHER IRON DEFICIENCY ANEMIA: ICD-10-CM

## 2019-11-26 DIAGNOSIS — E66.9 OBESITY (BMI 30-39.9): ICD-10-CM

## 2019-11-26 DIAGNOSIS — M25.512 CHRONIC LEFT SHOULDER PAIN: Primary | ICD-10-CM

## 2019-11-26 DIAGNOSIS — E55.9 VITAMIN D DEFICIENCY: ICD-10-CM

## 2019-11-26 DIAGNOSIS — F33.2 SEVERE EPISODE OF RECURRENT MAJOR DEPRESSIVE DISORDER, WITHOUT PSYCHOTIC FEATURES (HCC): ICD-10-CM

## 2019-11-26 DIAGNOSIS — G89.29 CHRONIC LEFT SHOULDER PAIN: ICD-10-CM

## 2019-11-26 DIAGNOSIS — G89.29 CHRONIC LEFT SHOULDER PAIN: Primary | ICD-10-CM

## 2019-11-26 DIAGNOSIS — R07.89 OTHER CHEST PAIN: ICD-10-CM

## 2019-11-26 DIAGNOSIS — R73.01 IMPAIRED FASTING BLOOD SUGAR: ICD-10-CM

## 2019-11-26 DIAGNOSIS — R20.2 PARESTHESIA: ICD-10-CM

## 2019-11-26 PROBLEM — G25.81 RLS (RESTLESS LEGS SYNDROME): Status: ACTIVE | Noted: 2019-09-12

## 2019-11-26 LAB — ERYTHROCYTE [SEDIMENTATION RATE] IN BLOOD: 14 MM/HR (ref 0–30)

## 2019-11-26 PROCEDURE — 80053 COMPREHEN METABOLIC PANEL: CPT

## 2019-11-26 PROCEDURE — 36415 COLL VENOUS BLD VENIPUNCTURE: CPT

## 2019-11-26 PROCEDURE — 82607 VITAMIN B-12: CPT

## 2019-11-26 PROCEDURE — 80061 LIPID PANEL: CPT

## 2019-11-26 PROCEDURE — 83540 ASSAY OF IRON: CPT

## 2019-11-26 PROCEDURE — 85025 COMPLETE CBC W/AUTO DIFF WBC: CPT

## 2019-11-26 PROCEDURE — 85652 RBC SED RATE AUTOMATED: CPT

## 2019-11-26 PROCEDURE — 84439 ASSAY OF FREE THYROXINE: CPT

## 2019-11-26 PROCEDURE — 82306 VITAMIN D 25 HYDROXY: CPT

## 2019-11-26 PROCEDURE — 83036 HEMOGLOBIN GLYCOSYLATED A1C: CPT

## 2019-11-26 PROCEDURE — 82728 ASSAY OF FERRITIN: CPT

## 2019-11-26 NOTE — PROGRESS NOTES
Shivani Villafuerte presents today for   Chief Complaint   Patient presents with    Hypertension     follow up    Depression     follow up       Is someone accompanying this pt? No    Is the patient using any DME equipment during OV? No    Depression Screening:  3 most recent PHQ Screens 8/14/2019   PHQ Not Done -   Little interest or pleasure in doing things Nearly every day   Feeling down, depressed, irritable, or hopeless Nearly every day   Total Score PHQ 2 6   Trouble falling or staying asleep, or sleeping too much Nearly every day   Feeling tired or having little energy Nearly every day   Poor appetite, weight loss, or overeating Nearly every day   Feeling bad about yourself - or that you are a failure or have let yourself or your family down More than half the days   Trouble concentrating on things such as school, work, reading, or watching TV Nearly every day   Moving or speaking so slowly that other people could have noticed; or the opposite being so fidgety that others notice More than half the days   Thoughts of being better off dead, or hurting yourself in some way More than half the days   PHQ 9 Score 24   How difficult have these problems made it for you to do your work, take care of your home and get along with others Somewhat difficult       Learning Assessment:  Learning Assessment 1/3/2019   PRIMARY LEARNER Patient   HIGHEST LEVEL OF EDUCATION - PRIMARY LEARNER  GRADUATED HIGH SCHOOL OR GED   BARRIERS PRIMARY LEARNER NONE   CO-LEARNER CAREGIVER No   PRIMARY LANGUAGE ENGLISH   LEARNER PREFERENCE PRIMARY LISTENING   ANSWERED BY self   RELATIONSHIP SELF       Abuse Screening:  Abuse Screening Questionnaire 1/22/2019   Do you ever feel afraid of your partner? N   Are you in a relationship with someone who physically or mentally threatens you? N   Is it safe for you to go home?  Y         Health Maintenance Due   Topic Date Due    Shingrix Vaccine Age 49> (1 of 2) 02/01/2016    BREAST CANCER SCRN MAMMOGRAM 02/01/2016    Influenza Age 9 to Adult  08/01/2019   . Health Maintenance reviewed and discussed and ordered per Provider. Mariola Taylor is updated on all     Coordination of Care  1. Have you been to the ER, urgent care clinic since your last visit? Hospitalized since your last visit? No    2. Have you seen or consulted any other health care providers outside of the [address] [place] [address] since your last visit? Include any pap smears or colon screening. No        Advance Directive:  1. Do you have an advance directive in place? Patient Reply:No    2. If not, would you like material regarding how to put one in place?  Patient Reply: No

## 2019-11-26 NOTE — PROGRESS NOTES
HPI  Pt presents for follow up    Continues to have left shoulder pain with little relief. Saw Dr Victoriano Simental as second opinion. Was told to consider going to [facility]. Still going to pain management. BP elevated. Says she is taking her medication and feels that blood pressure may be related to stress or pain. Has an appointment on Friday with cardiology. Said that she started having chest pain again over the past week. Last night was having pain- lasts a few seconds and goes away, and then comes back again. Overall pain lasts for a minute. Describes it as a sharp pain. goes away if she holds her breath. Denies palpitations or shortness of breath    Has not had labs drawn since January. Agreeable to getting labs drawn today    Past Medical History  No past medical history on file. Surgical History  Past Surgical History:   Procedure Laterality Date    HX SHOULDER ARTHROSCOPY Left 2017 and oct 2018        Medications  Current Outpatient Medications   Medication Sig Dispense Refill    gabapentin (NEURONTIN) 300 mg capsule TAKE 1 CAPSULE BY MOUTH AT DINNER TIME AND 1 CAPSULE BY MOUTH AT BEDTIME  0    DULoxetine (CYMBALTA) 60 mg capsule duloxetine 60 mg capsule,delayed release   TAKE 1 CAPSULE(S) EVERY DAY BY ORAL ROUTE FOR 15 DAYS.  celecoxib (CELEBREX) 200 mg capsule Take 200 mg by mouth.  cyclobenzaprine (FLEXERIL) 5 mg tablet Take 1 Tab by mouth three (3) times daily as needed for Muscle Spasm(s). 30 Tab 0    escitalopram oxalate (LEXAPRO) 20 mg tablet Take 1 Tab by mouth daily. 90 Tab 3    amLODIPine (NORVASC) 10 mg tablet Take 1 Tab by mouth daily.  90 Tab 3       Allergies  Allergies   Allergen Reactions    Prednisone Other (comments)     Coughs up blood         Family History  Family History   Problem Relation Age of Onset    Diabetes Mother     Heart Attack Mother     Diabetes Father     Lupus Sister     Depression Other     Depression Paternal Uncle        Social History  Social History     Socioeconomic History    Marital status: UNKNOWN     Spouse name: Not on file    Number of children: Not on file    Years of education: Not on file    Highest education level: Not on file   Occupational History    Not on file   Social Needs    Financial resource strain: Not on file    Food insecurity:     Worry: Not on file     Inability: Not on file    Transportation needs:     Medical: Not on file     Non-medical: Not on file   Tobacco Use    Smoking status: Never Smoker    Smokeless tobacco: Never Used   Substance and Sexual Activity    Alcohol use: No     Frequency: Never    Drug use: No    Sexual activity: Yes     Partners: Male   Lifestyle    Physical activity:     Days per week: Not on file     Minutes per session: Not on file    Stress: Not on file   Relationships    Social connections:     Talks on phone: Not on file     Gets together: Not on file     Attends Spiritism service: Not on file     Active member of club or organization: Not on file     Attends meetings of clubs or organizations: Not on file     Relationship status: Not on file    Intimate partner violence:     Fear of current or ex partner: Not on file     Emotionally abused: Not on file     Physically abused: Not on file     Forced sexual activity: Not on file   Other Topics Concern    Not on file   Social History Narrative    Not on file       Problem List  Patient Active Problem List   Diagnosis Code    [facility] F32.1    Depression F32.9    HTN (hypertension) I10    Mixed hyperlipidemia E78.2    Uterine leiomyoma D25.9    Vitamin D deficiency E55.9    GRETCHEN (iron deficiency anemia) D50.9    Other chronic pain G89.29    Chronic left shoulder pain M25.512, G89.29    Other chest pain R07.89    RLS (restless legs syndrome) G25.81    Obesity (BMI 30-39. 9) E66.9       Review of Systems  Review of Systems   Constitutional: Negative. Respiratory: Negative. Cardiovascular: Positive for chest pain. Negative for palpitations and leg swelling. Musculoskeletal:        Shoulder pain   Neurological: Negative. Vital Signs  Vitals:    11/26/19 1136 11/26/19 1138   BP: 142/90 (!) 142/92   Pulse: 83    Resp: 16    Temp: 97.5 °F (36.4 °C)    TempSrc: Oral    SpO2: 97%    Weight: 191 lb (86.6 kg)    Height: 5' 4\" (1.626 m)    PainSc:   8    PainLoc: Shoulder        Physical Exam  Physical Exam  Vitals signs and nursing note reviewed. Constitutional:       Appearance: Normal appearance. Neck:      Musculoskeletal: Normal range of motion and neck supple. Cardiovascular:      Rate and Rhythm: Normal rate and regular rhythm. Heart sounds: Normal heart sounds. Pulmonary:      Effort: Pulmonary effort is normal.      Breath sounds: Normal breath sounds. Skin:     General: Skin is warm and dry. Neurological:      Mental Status: She is alert and oriented to person, place, and time. Psychiatric:         Mood and Affect: Mood normal.         Diagnostics  No orders of the defined types were placed in this encounter.       Results  Results for orders placed or performed during the hospital encounter of 10/29/19   ECHO ADULT COMPLETE   Result Value Ref Range    AO ASC D 2.49 cm    Aortic Valve Systolic Peak Velocity 765.02 cm/s    AoV VTI 25.59 cm    Aortic Valve Area by Continuity of Peak Velocity 2.8 cm2    Aortic Valve Area by Continuity of VTI 2.4 cm2    AoV PG 4.5 mmHg    LVIDd 4.38 3.9 - 5.3 cm    LVPWd 0.97 (A) 0.6 - 0.9 cm    LVIDs 2.91 cm    IVSd 1.17 (A) 0.6 - 0.9 cm    LV ED Vol A2C 88.9 mL    LV ES Vol A4C 32.1 mL    LV ES Vol BP 24.6 19 - 49 mL    LVOT d 2.01 cm    LVOT Peak Velocity 94.07 cm/s    LVOT Peak Gradient 3.5 mmHg    LVOT VTI 19.38 cm    MVA (PHT) 2.0 cm2    MV A George 52.53 cm/s    MV E George 50.91 cm/s    MV E/A 0.97     RVIDd 3.30 cm    Aortic Valve Systolic Mean Gradient 2.9 mmHg    BP EF 70.4 55 - 100 %    LV Ejection Fraction MOD 4C 57 % LV Ejection Fraction MOD 2C 79 %    LA Vol 4C 39.39 22 - 52 mL    LA Vol 2C 83.28 (A) 22 - 52 mL    LA Area 2C 22.34 cm2    LA Area 4C 15.2 cm2    LV Mass .2 (A) 67 - 162 g    LV Mass AL Index 100.2 43 - 95 g/m2    LVPWs 0.00 cm    LV ES Vol A2C 19.1 mL    LVES Vol Index BP 13.1 mL/m2    LV ED Vol A4C 75.2 mL    LVED Vol Index BP 44.2 mL/m2    Mitral Valve E Wave Deceleration Time 372.5 ms    Mitral Valve Pressure Half-time 108.0 ms    Left Atrium Major Axis 3.59 cm    Triscuspid Valve Regurgitation Peak Gradient 14.9 mmHg    LV ED Vol BP 83.1 56 - 104 ml    TR Max Velocity 1.93 cm/s    LA Vol Index 44.32 16 - 28 ml/m2    LA Vol Index 20.96 16 - 28 ml/m2    LVED Vol Index A4C 40.0 mL/m2    LVED Vol Index A2C 47.3 mL/m2    LVES Vol Index A4C 17.1 mL/m2    LVES Vol Index A2C 10.2 mL/m2    PATEL/BSA Pk George 1.5 cm2/m2    PATEL/BSA VTI 1.2 cm2/m2    Left Ventricular Fractional Shortening by 2D 51.92540846 %    Left Ventricular Outflow Tract Mean Gradient 1.25383019602791 mmHg    Left Ventricular Outflow Tract Mean Velocity 1.78187786876948 cm/s    PV End Diastolic Velocity 1.2 mmHg    Mitral Valve Deceleration [place] 3.98233833819257     AV Velocity Ratio 0.89     AV VTI Ratio 0.8     Aortic valve mean velocity 9.63405781721766 m/s    Left Ventricular End Diastolic Volume by Teichholz Method 2.46789160028 mL    Left Ventricular End Systolic Volume by Teichholz Method 2.95205625369 mL    Left Ventricular Stroke Volume by 2-D Biplane-MOD 93.596886557 mL    Left Ventricular Stroke Volume by 2-D Single Plane- MOD 65.6603699 mL    Left Ventricular Stroke Volume by Teichholz Method 38.815266365 mL    Left Ventricular Stroke Volume by 2-D Single Plane- MOD 92.229970512 mL    IVC proximal 1.50 cm    LA Volume 66.93 22 - 52 ml    PASP 20.0 mmHg         Assessment and Plan  Diagnoses and all orders for this visit:    1.  Chronic left shoulder pain  Patient was encouraged to explore the option of seeking treatment orthopedic doctor in [place]. Continue with pain management    2. Severe episode of recurrent major depressive disorder, without psychotic features ([address]  Continue with present plan of care    3. Other chest pain  Discussed options for treatment including going to the ER, doing an EKG in the office, calling cardiology to see if there is a sooner appointment, or following up with cardiology as scheduled. Patient declines any chest pain since last night. Said that she has an appointment to get to shortly and does not feel that she needs to get EKG at this time. Said that she will call cardiologist office about sooner appointment. Reviewed emergency precautions    4. Essential hypertension  Continue with present plan of care. Monitor blood pressure at home. Discussed that we may need to increase dosage of medication if she continues to have elevated blood pressure    5. Mixed hyperlipidemia  We will recheck labs    6. Obesity (BMI 30-39. 9)        After care summary printed and reviewed with patient. Plan reviewed with patient. Questions answered. Patient verbalized understanding of plan and is in agreement with plan. Patient to follow up in one month or earlier if needed. Encouraged the use of my chart.     MAGI Lopez

## 2019-11-27 LAB
25(OH)D3 SERPL-MCNC: 26.4 NG/ML (ref 30–100)
ALBUMIN SERPL-MCNC: 3.6 G/DL (ref 3.4–5)
ALBUMIN/GLOB SERPL: 1.1 {RATIO} (ref 0.8–1.7)
ALP SERPL-CCNC: 75 U/L (ref 45–117)
ALT SERPL-CCNC: 35 U/L (ref 13–56)
ANION GAP SERPL CALC-SCNC: 3 MMOL/L (ref 3–18)
AST SERPL-CCNC: 26 U/L (ref 10–38)
BASOPHILS # BLD: 0 K/UL (ref 0–0.1)
BASOPHILS NFR BLD: 0 % (ref 0–2)
BILIRUB SERPL-MCNC: 0.6 MG/DL (ref 0.2–1)
BUN SERPL-MCNC: 11 MG/DL (ref 7–18)
BUN/CREAT SERPL: 12 (ref 12–20)
CALCIUM SERPL-MCNC: 9.3 MG/DL (ref 8.5–10.1)
CHLORIDE SERPL-SCNC: 109 MMOL/L (ref 100–111)
CHOLEST SERPL-MCNC: 194 MG/DL
CO2 SERPL-SCNC: 29 MMOL/L (ref 21–32)
CREAT SERPL-MCNC: 0.94 MG/DL (ref 0.6–1.3)
DIFFERENTIAL METHOD BLD: ABNORMAL
EOSINOPHIL # BLD: 0.1 K/UL (ref 0–0.4)
EOSINOPHIL NFR BLD: 2 % (ref 0–5)
ERYTHROCYTE [DISTWIDTH] IN BLOOD BY AUTOMATED COUNT: 15.9 % (ref 11.6–14.5)
FERRITIN SERPL-MCNC: 5 NG/ML (ref 8–388)
FOLATE SERPL-MCNC: 19.9 NG/ML (ref 3.1–17.5)
GLOBULIN SER CALC-MCNC: 3.3 G/DL (ref 2–4)
GLUCOSE SERPL-MCNC: 96 MG/DL (ref 74–99)
HBA1C MFR BLD: 5.7 % (ref 4.2–5.6)
HCT VFR BLD AUTO: 35.1 % (ref 35–45)
HDLC SERPL-MCNC: 72 MG/DL (ref 40–60)
HDLC SERPL: 2.7 {RATIO} (ref 0–5)
HGB BLD-MCNC: 12.4 G/DL (ref 12–16)
IRON SATN MFR SERPL: 13 %
IRON SERPL-MCNC: 52 UG/DL (ref 50–175)
LDLC SERPL CALC-MCNC: 103.8 MG/DL (ref 0–100)
LIPID PROFILE,FLP: ABNORMAL
LYMPHOCYTES # BLD: 2 K/UL (ref 0.9–3.6)
LYMPHOCYTES NFR BLD: 36 % (ref 21–52)
MCH RBC QN AUTO: 26.7 PG (ref 24–34)
MCHC RBC AUTO-ENTMCNC: 35.3 G/DL (ref 31–37)
MCV RBC AUTO: 75.5 FL (ref 74–97)
MONOCYTES # BLD: 0.5 K/UL (ref 0.05–1.2)
MONOCYTES NFR BLD: 8 % (ref 3–10)
NEUTS SEG # BLD: 2.9 K/UL (ref 1.8–8)
NEUTS SEG NFR BLD: 54 % (ref 40–73)
PLATELET # BLD AUTO: 219 K/UL (ref 135–420)
PMV BLD AUTO: 10.9 FL (ref 9.2–11.8)
POTASSIUM SERPL-SCNC: 3.5 MMOL/L (ref 3.5–5.5)
PROT SERPL-MCNC: 6.9 G/DL (ref 6.4–8.2)
RBC # BLD AUTO: 4.65 M/UL (ref 4.2–5.3)
SODIUM SERPL-SCNC: 141 MMOL/L (ref 136–145)
T4 FREE SERPL-MCNC: 1.1 NG/DL (ref 0.7–1.5)
TIBC SERPL-MCNC: 412 UG/DL (ref 250–450)
TRIGL SERPL-MCNC: 91 MG/DL (ref ?–150)
TSH SERPL DL<=0.05 MIU/L-ACNC: 0.7 UIU/ML (ref 0.36–3.74)
VIT B12 SERPL-MCNC: 343 PG/ML (ref 211–911)
VLDLC SERPL CALC-MCNC: 18.2 MG/DL
WBC # BLD AUTO: 5.5 K/UL (ref 4.6–13.2)

## 2019-11-29 ENCOUNTER — OFFICE VISIT (OUTPATIENT)
Dept: CARDIOLOGY CLINIC | Age: 54
End: 2019-11-29

## 2019-11-29 VITALS
DIASTOLIC BLOOD PRESSURE: 100 MMHG | HEIGHT: 64 IN | BODY MASS INDEX: 33.29 KG/M2 | WEIGHT: 195 LBS | HEART RATE: 106 BPM | OXYGEN SATURATION: 97 % | SYSTOLIC BLOOD PRESSURE: 150 MMHG

## 2019-11-29 DIAGNOSIS — R07.9 CHEST PAIN, UNSPECIFIED TYPE: Primary | ICD-10-CM

## 2019-11-29 DIAGNOSIS — I10 HYPERTENSION, UNSPECIFIED TYPE: ICD-10-CM

## 2019-11-29 RX ORDER — METOPROLOL SUCCINATE 50 MG/1
50 TABLET, EXTENDED RELEASE ORAL DAILY
Qty: 30 TAB | Refills: 4 | Status: SHIPPED | OUTPATIENT
Start: 2019-11-29 | End: 2020-04-28

## 2019-11-29 NOTE — PROGRESS NOTES
Robin Cummings    CC: CP    HPI    Robin Cummings is a 49 y.o. extremely pleasant  female with no known heart disease here for evaluation of persistent chest discomfort and shortness of breath. As you know patient has noticed pressure in her chest off and on for almost a year now. Describes a heavy pressure in the center and she tends to take a deep breath and hold it in and can make the pain go away. She also has some swelling in her legs and complains of dizziness. She might feel some shortness of breath but is not really with exertion. Actually she does not believe any of her symptoms are really with exertion but admits she is really not been able to be active due to issues with her shoulder. Takes quite a bit of medications for musculoskeletal problems but wanted to make sure that her heart was okay as her mother had premature coronary disease in her 46s. Otherwise she has not passed out and denies palpitations. It should be noted that she has been on Norvasc for many years but was recently increased to 10 mg daily is unclear when this medication change was made and patient herself was unable to tell me. She thinks it contributes to LE edema. I sent her for an echocardiogram and pharm nuc due to her persistent symptoms. While her nuclear stress test was read as abnormal, I personally reviewed the images myself and disagree with this. Very poor image acquisition with what looks like processing errors and attenuation artifact. CV RFs: HTN, +FH premature CAD    No past medical history on file.     Past Surgical History:   Procedure Laterality Date    HX SHOULDER ARTHROSCOPY Left 2017 and oct 2018       Current Outpatient Medications   Medication Sig Dispense Refill    gabapentin (NEURONTIN) 300 mg capsule TAKE 1 CAPSULE BY MOUTH AT DINNER TIME AND 1 CAPSULE BY MOUTH AT BEDTIME  0    DULoxetine (CYMBALTA) 60 mg capsule duloxetine 60 mg capsule,delayed release   TAKE 1 CAPSULE(S) EVERY DAY BY ORAL ROUTE FOR 15 DAYS.  celecoxib (CELEBREX) 200 mg capsule Take 200 mg by mouth.  cyclobenzaprine (FLEXERIL) 5 mg tablet Take 1 Tab by mouth three (3) times daily as needed for Muscle Spasm(s). 30 Tab 0    escitalopram oxalate (LEXAPRO) 20 mg tablet Take 1 Tab by mouth daily. 90 Tab 3    amLODIPine (NORVASC) 10 mg tablet Take 1 Tab by mouth daily.  90 Tab 3       Allergies   Allergen Reactions    Prednisone Other (comments)     Coughs up blood         Social History     Socioeconomic History    Marital status: SINGLE     Spouse name: Not on file    Number of children: Not on file    Years of education: Not on file    Highest education level: Not on file   Occupational History    Not on file   Social Needs    Financial resource strain: Not on file    Food insecurity:     Worry: Not on file     Inability: Not on file    Transportation needs:     Medical: Not on file     Non-medical: Not on file   Tobacco Use    Smoking status: Never Smoker    Smokeless tobacco: Never Used   Substance and Sexual Activity    Alcohol use: No     Frequency: Never    Drug use: No    Sexual activity: Yes     Partners: Male   Lifestyle    Physical activity:     Days per week: Not on file     Minutes per session: Not on file    Stress: Not on file   Relationships    Social connections:     Talks on phone: Not on file     Gets together: Not on file     Attends [employer] service: Not on file     Active member of club or organization: Not on file     Attends meetings of clubs or organizations: Not on file     Relationship status: Not on file    Intimate partner violence:     Fear of current or ex partner: Not on file     Emotionally abused: Not on file     Physically abused: Not on file     Forced sexual activity: Not on file   Other Topics Concern    Not on file   Social History Narrative    Not on file        FH: + premature CAD, no SCD    Review of Systems    14 pt Review of Systems is negative unless otherwise mentioned in the HPI. Wt Readings from Last 3 Encounters:   11/29/19 88.5 kg (195 lb)   11/26/19 86.6 kg (191 lb)   11/19/19 86.7 kg (191 lb 3.2 oz)     Temp Readings from Last 3 Encounters:   11/26/19 97.5 °F (36.4 °C) (Oral)   11/19/19 98.2 °F (36.8 °C) (Oral)   08/14/19 98.4 °F (36.9 °C) (Oral)     BP Readings from Last 3 Encounters:   11/26/19 (!) 142/92   11/19/19 (!) 168/113   10/29/19 (!) 172/116     Pulse Readings from Last 3 Encounters:   11/26/19 83   11/19/19 91   09/24/19 84     Physical Exam:    Visit Vitals  Ht 5' 4\" (1.626 m)   Wt 88.5 kg (195 lb)   BMI 33.47 kg/m²      Physical Exam  Constitutional:       General: She is not in acute distress. Appearance: She is well-developed. She is not diaphoretic. HENT:      Head: Normocephalic and atraumatic. Eyes:      Pupils: Pupils are equal, round, and reactive to light. Cardiovascular:      Rate and Rhythm: Normal rate and regular rhythm. Heart sounds: Normal heart sounds. No murmur. No friction rub. No gallop. Comments: ++some tenderness to palpation anterior chest wall  Pulmonary:      Effort: Pulmonary effort is normal. No respiratory distress. Breath sounds: Normal breath sounds. No wheezing or rales. Chest:      Chest wall: No tenderness. Abdominal:      General: Bowel sounds are normal.      Palpations: Abdomen is soft. Musculoskeletal:         General: No tenderness. Skin:     General: Skin is warm and dry. Neurological:      Mental Status: She is alert and oriented to person, place, and time. EKG today shows: NSR, normal axis and intervals, no ST segment abnormalities    10/29/19   NUCLEAR CARDIAC STRESS TEST 10/30/2019 10/30/2019    Narrative · Baseline ECG: Normal sinus rhythm. · Inconclusive stress test.  · Gated SPECT: Left ventricular function post-stress was normal.   Calculated ejection fraction is 66%. Evidence of transient ischemic   dilation (TID). The TID ratio is 1.64.   · Left ventricular perfusion is abnormal.  · Myocardial perfusion imaging defect 1: There is a defect that is small   in size with a moderate reduction in uptake present in the apical lateral   location(s) that is reversible. There is normal wall motion in the defect   area. The defect appears to be ischemia. · Myocardial perfusion imaging defect 2: There is a defect that is   moderate in size with a moderate reduction in uptake affecting the mid   inferolateral location(s) that is reversible. There is normal wall motion   in the defect area. The defect appears to be ischemia. Signed by: Leigh Walker MD     10/29/19   ECHO ADULT COMPLETE 10/29/2019 10/29/2019    Narrative · Left Ventricle: Normal cavity size and systolic function (ejection   fraction normal). Upper normal wall thickness. The estimated ejection   fraction is 61 - 65%. There is mild (grade 1) left ventricular diastolic   dysfunction. · Tricuspid Valve: Normal valve structure and no stenosis. Trace tricuspid   valve regurgitation. Pulmonary arterial systolic pressure is 67.2 mmHg. There is no evidence of pulmonary hypertension.         Signed by: Leigh Walker MD         Impression and Plan:  eTrry Pope is a 49 y.o. with:    1.) Atypical but persistent CP  2.) HTN, uncontrolled  3.) +FH premature CAD  4.) Severe MSK shoulder pain s/p surgery with decreased ROM, very well could be contributing/ cause of #1  5.) Normal echo   6.) suspect false positive nuc/ processing error    1.) Start Metoprolol 50 SR for better BP control, if element of angina should also improve  2.) I personally think her nuc is a false positive but due to her continued symptoms, I did offer her LHC- she preferred trying medical therapy which is very reasonable as her symptoms are quite atypical for ACS  3.) RTC 3 months, call me sooner if problems    Thank you for allowing me to participate in the care of your patient, please do not hesitate to call with questions or concerns.     Kindest Regards,    Joni Mealing, DO

## 2019-11-29 NOTE — PROGRESS NOTES
Ramo Matta presents today for   Chief Complaint   Patient presents with    Chest Pain (Angina)     pressure in the center of chest for the past 3 days lasts 10 minutes     Hypertension    Palpitations     racing     Dizziness    Leg Swelling     both feet        Ramo Matta preferred language for health care discussion is english/other. Is someone accompanying this pt? no    Is the patient using any DME equipment during [address]? no    Depression Screening:  3 most recent PHQ Screens 8/14/2019   PHQ Not Done -   Little interest or pleasure in doing things Nearly every day   Feeling down, depressed, irritable, or hopeless Nearly every day   Total Score PHQ 2 6   Trouble falling or staying asleep, or sleeping too much Nearly every day   Feeling tired or having little energy Nearly every day   Poor appetite, weight loss, or overeating Nearly every day   Feeling bad about yourself - or that you are a failure or have let yourself or your family down More than half the days   Trouble concentrating on things such as school, work, reading, or watching TV Nearly every day   Moving or speaking so slowly that other people could have noticed; or the opposite being so fidgety that others notice More than half the days   Thoughts of being better off dead, or hurting yourself in some way More than half the days   PHQ 9 Score 24   How difficult have these problems made it for you to do your work, take care of your home and get along with others Somewhat difficult       Learning Assessment:  Learning Assessment 1/3/2019   PRIMARY LEARNER Patient   HIGHEST LEVEL OF EDUCATION - PRIMARY LEARNER  GRADUATED HIGH SCHOOL OR GED   BARRIERS PRIMARY LEARNER NONE   CO-LEARNER CAREGIVER No   PRIMARY LANGUAGE ENGLISH   LEARNER PREFERENCE PRIMARY LISTENING   ANSWERED BY self   RELATIONSHIP SELF       Abuse Screening:  Abuse Screening Questionnaire 1/22/2019   Do you ever feel afraid of your partner?  N   Are you in a relationship with someone who physically or mentally threatens you? N   Is it safe for you to go home? Y       Fall Risk  Fall Risk Assessment, last 12 mths 1/22/2019   Able to walk? Yes   Fall in past 12 months? No       Pt currently taking Anticoagulant therapy? no    Coordination of Care:  1. Have you been to the ER, urgent care clinic since your last visit? Hospitalized since your last visit? no    2. Have you seen or consulted any other health care providers outside of the [address] [place] [address] since your last visit? Include any pap smears or colon screening.  no

## 2020-02-27 ENCOUNTER — OFFICE VISIT (OUTPATIENT)
Dept: FAMILY MEDICINE CLINIC | Age: 55
End: 2020-02-27

## 2020-02-27 VITALS
OXYGEN SATURATION: 97 % | HEIGHT: 64 IN | BODY MASS INDEX: 32.95 KG/M2 | HEART RATE: 83 BPM | WEIGHT: 193 LBS | TEMPERATURE: 97.6 F | SYSTOLIC BLOOD PRESSURE: 124 MMHG | DIASTOLIC BLOOD PRESSURE: 84 MMHG | RESPIRATION RATE: 20 BRPM

## 2020-02-27 DIAGNOSIS — G89.29 CHRONIC LEFT SHOULDER PAIN: ICD-10-CM

## 2020-02-27 DIAGNOSIS — M25.512 CHRONIC LEFT SHOULDER PAIN: ICD-10-CM

## 2020-02-27 DIAGNOSIS — F32.1 MODERATE MAJOR DEPRESSION (HCC): ICD-10-CM

## 2020-02-27 DIAGNOSIS — D50.8 OTHER IRON DEFICIENCY ANEMIA: ICD-10-CM

## 2020-02-27 DIAGNOSIS — B07.8 OTHER VIRAL WARTS: Primary | ICD-10-CM

## 2020-02-27 RX ORDER — TIZANIDINE 4 MG/1
4 TABLET ORAL
COMMUNITY

## 2020-02-27 RX ORDER — ASCORBIC ACID 250 MG
250 TABLET ORAL DAILY
Qty: 90 TAB | Refills: 1 | Status: SHIPPED | OUTPATIENT
Start: 2020-02-27 | End: 2020-09-04 | Stop reason: SDUPTHER

## 2020-02-27 RX ORDER — FERROUS SULFATE 325(65) MG
325 TABLET, DELAYED RELEASE (ENTERIC COATED) ORAL
Qty: 90 TAB | Refills: 1 | Status: SHIPPED | OUTPATIENT
Start: 2020-02-27 | End: 2020-09-04 | Stop reason: SDUPTHER

## 2020-02-27 NOTE — PROGRESS NOTES
Cedric Rape presents today for   Chief Complaint   Patient presents with    Skin Problem     States she has moles on her abdomen(Umbilicus)       Is someone accompanying this pt? No    Is the patient using any DME equipment during OV? No    Depression Screening:  3 most recent PHQ Screens 2/27/2020   PHQ Not Done -   Little interest or pleasure in doing things More than half the days   Feeling down, depressed, irritable, or hopeless Nearly every day   Total Score PHQ 2 5   Trouble falling or staying asleep, or sleeping too much Several days   Feeling tired or having little energy Several days   Poor appetite, weight loss, or overeating -   Feeling bad about yourself - or that you are a failure or have let yourself or your family down Several days   Trouble concentrating on things such as school, work, reading, or watching TV Several days   Moving or speaking so slowly that other people could have noticed; or the opposite being so fidgety that others notice Not at all   Thoughts of being better off dead, or hurting yourself in some way Several days   PHQ 9 Score -   How difficult have these problems made it for you to do your work, take care of your home and get along with others Somewhat difficult       Learning Assessment:  Learning Assessment 1/3/2019   PRIMARY LEARNER Patient   HIGHEST LEVEL OF EDUCATION - PRIMARY LEARNER  GRADUATED HIGH SCHOOL OR GED   BARRIERS PRIMARY LEARNER NONE   CO-LEARNER CAREGIVER No   PRIMARY LANGUAGE ENGLISH   LEARNER PREFERENCE PRIMARY LISTENING   ANSWERED BY self   RELATIONSHIP SELF       Abuse Screening:  Abuse Screening Questionnaire 2/27/2020   Do you ever feel afraid of your partner? N   Are you in a relationship with someone who physically or mentally threatens you? N   Is it safe for you to go home?  Y         Health Maintenance Due   Topic Date Due    DTaP/Tdap/Td series (1 - Tdap) 02/01/1977    Shingrix Vaccine Age 50> (1 of 2) 02/01/2016    Breast Cancer Screen Mammogram 02/01/2016    Influenza Age 9 to Adult  08/01/2019   . Health Maintenance reviewed and discussed and ordered per Provider. Coordination of Care  1. Have you been to the ER, urgent care clinic since your last visit? Hospitalized since your last visit? No    2. Have you seen or consulted any other health care providers outside of the [address] [place] [address] since your last visit? Include any pap smears or colon screening. No        Advance Directive:  1. Do you have an advance directive in place? Patient Reply:No    2. If not, would you like material regarding how to put one in place?  Patient Reply: No

## 2020-02-27 NOTE — PROGRESS NOTES
HPI  Pt presents with complaint of \"moles\" in umbilicus that have been there for a while and are irritating her. Feels that they are getting larger and multiplying. Itching. Asking about referral to dermatology    Says that she has an appointment for evaluation of her left shoulder coming up soon with a provider in [facility]    Recently saw specialist for restless leg and was told that she should be taking iron and vitamin C supplements but she wasn't prescribed any    Continues to verbalize that she is very depressed. Denies thoughts of self harm. Says that she is taking medication as prescribed    Past Medical History  No past medical history on file. Surgical History  Past Surgical History:   Procedure Laterality Date    HX SHOULDER ARTHROSCOPY Left 2017 and oct 2018        Medications  Current Outpatient Medications   Medication Sig Dispense Refill    tiZANidine (ZANAFLEX) 4 mg tablet Take 4 mg by mouth two (2) times daily as needed.  ferrous sulfate (IRON) 325 mg (65 mg iron) EC tablet Take 1 Tab by mouth Daily (before breakfast). 90 Tab 1    ascorbic acid, vitamin C, (VITAMIN C) 250 mg tablet Take 1 Tab by mouth daily. 90 Tab 1    metoprolol succinate (TOPROL-XL) 50 mg XL tablet Take 1 Tab by mouth daily. 30 Tab 4    gabapentin (NEURONTIN) 300 mg capsule TAKE 1 CAPSULE BY MOUTH AT DINNER TIME AND 1 CAPSULE BY MOUTH AT BEDTIME  0    DULoxetine (CYMBALTA) 60 mg capsule duloxetine 60 mg capsule,delayed release   TAKE 1 CAPSULE(S) EVERY DAY BY ORAL ROUTE FOR 15 DAYS.  celecoxib (CELEBREX) 200 mg capsule Take 200 mg by mouth.  escitalopram oxalate (LEXAPRO) 20 mg tablet Take 1 Tab by mouth daily.  90 Tab 3       Allergies  Allergies   Allergen Reactions    Prednisone Other (comments)     Coughs up blood         Family History  Family History   Problem Relation Age of Onset    Diabetes Mother     Heart Attack Mother     Diabetes Father     Lupus Sister     Depression Other     Depression Paternal Uncle        Social History  Social History     Socioeconomic History    Marital status: SINGLE     Spouse name: Not on file    Number of children: Not on file    Years of education: Not on file    Highest education level: Not on file   Occupational History    Not on file   Social Needs    Financial resource strain: Not on file    Food insecurity:     Worry: Not on file     Inability: Not on file    Transportation needs:     Medical: Not on file     Non-medical: Not on file   Tobacco Use    Smoking status: Never Smoker    Smokeless tobacco: Never Used   Substance and Sexual Activity    Alcohol use: No     Frequency: Never    Drug use: No    Sexual activity: Yes     Partners: Male   Lifestyle    Physical activity:     Days per week: Not on file     Minutes per session: Not on file    Stress: Not on file   Relationships    Social connections:     Talks on phone: Not on file     Gets together: Not on file     Attends Anglican service: Not on file     Active member of club or organization: Not on file     Attends meetings of clubs or organizations: Not on file     Relationship status: Not on file    Intimate partner violence:     Fear of current or ex partner: Not on file     Emotionally abused: Not on file     Physically abused: Not on file     Forced sexual activity: Not on file   Other Topics Concern    Not on file   Social History Narrative    Not on file       Problem List  Patient Active Problem List   Diagnosis Code    Moderate major depression ([facility] [address] F32.1    Depression F32.9    HTN (hypertension) I10    Mixed hyperlipidemia E78.2    Uterine leiomyoma D25.9    Vitamin D deficiency E55.9    GRETCHEN (iron deficiency anemia) D50.9    Other chronic pain G89.29    Chronic left shoulder pain M25.512, G89.29    Other chest pain R07.89    RLS (restless legs syndrome) G25.81    Obesity (BMI 30-39. 9) E66.9    Other viral warts B07.8       Review of Systems  Review of Systems Respiratory: Negative. Cardiovascular: Negative. Musculoskeletal: Positive for joint pain. Skin: Positive for rash. Psychiatric/Behavioral: Positive for depression. Negative for suicidal ideas. Vital Signs  Vitals:    02/27/20 1405 02/27/20 1409   BP: (!) 130/100 124/84   Pulse: 83    Resp: 20    Temp: 97.6 °F (36.4 °C)    TempSrc: Oral    SpO2: 97%    Weight: 193 lb (87.5 kg)    Height: 5' 4\" (1.626 m)    PainSc:   8    PainLoc: Shoulder        Physical Exam  Physical Exam  Vitals signs and nursing note reviewed. Constitutional:       Appearance: Normal appearance. She is not ill-appearing. Cardiovascular:      Rate and Rhythm: Normal rate and regular rhythm. Heart sounds: Normal heart sounds. Pulmonary:      Effort: Pulmonary effort is normal.      Breath sounds: Normal breath sounds. Skin:     General: Skin is warm and dry. Comments: Several brown raised papules noted in umbilicus. No drainage or redness noted   Neurological:      Mental Status: She is alert and oriented to person, place, and time. Psychiatric:         Behavior: Behavior normal.         Diagnostics  Orders Placed This Encounter    REFERRAL TO DERMATOLOGY     Referral Priority:   Routine     Referral Type:   Consultation     Referral Reason:   Specialty Services Required     Referred to Provider:   Juan Bowman MD     Requested Specialty:   Dermatology     Number of Visits Requested:   20    tiZANidine (ZANAFLEX) 4 mg tablet     Sig: Take 4 mg by mouth two (2) times daily as needed.  ferrous sulfate (IRON) 325 mg (65 mg iron) EC tablet     Sig: Take 1 Tab by mouth Daily (before breakfast). Dispense:  90 Tab     Refill:  1    ascorbic acid, vitamin C, (VITAMIN C) 250 mg tablet     Sig: Take 1 Tab by mouth daily.      Dispense:  90 Tab     Refill:  1       Results  Results for orders placed or performed during the hospital encounter of 11/26/19   CBC WITH AUTOMATED DIFF   Result Value Ref Range    WBC 5.5 4.6 - 13.2 K/uL    RBC 4.65 4.20 - 5.30 M/uL    HGB 12.4 12.0 - 16.0 g/dL    HCT 35.1 35.0 - 45.0 %    MCV 75.5 74.0 - 97.0 FL    MCH 26.7 24.0 - 34.0 PG    MCHC 35.3 31.0 - 37.0 g/dL    RDW 15.9 (H) 11.6 - 14.5 %    PLATELET 962 310 - 672 K/uL    MPV 10.9 9.2 - 11.8 FL    NEUTROPHILS 54 40 - 73 %    LYMPHOCYTES 36 21 - 52 %    MONOCYTES 8 3 - 10 %    EOSINOPHILS 2 0 - 5 %    BASOPHILS 0 0 - 2 %    ABS. NEUTROPHILS 2.9 1.8 - 8.0 K/UL    ABS. LYMPHOCYTES 2.0 0.9 - 3.6 K/UL    ABS. MONOCYTES 0.5 0.05 - 1.2 K/UL    ABS. EOSINOPHILS 0.1 0.0 - 0.4 K/UL    ABS. BASOPHILS 0.0 0.0 - 0.1 K/UL    DF AUTOMATED     IRON PROFILE   Result Value Ref Range    Iron 52 50 - 175 ug/dL    TIBC 412 250 - 450 ug/dL    Iron % saturation 13 %   FERRITIN   Result Value Ref Range    Ferritin 5 (L) 8 - 161 NG/ML   METABOLIC PANEL, COMPREHENSIVE   Result Value Ref Range    Sodium 141 136 - 145 mmol/L    Potassium 3.5 3.5 - 5.5 mmol/L    Chloride 109 100 - 111 mmol/L    CO2 29 21 - 32 mmol/L    Anion gap 3 3.0 - 18 mmol/L    Glucose 96 74 - 99 mg/dL    BUN 11 7.0 - 18 MG/DL    Creatinine 0.94 0.6 - 1.3 MG/DL    BUN/Creatinine ratio 12 12 - 20      GFR est AA >60 >60 ml/min/1.73m2    GFR est non-AA >60 >60 ml/min/1.73m2    Calcium 9.3 8.5 - 10.1 MG/DL    Bilirubin, total 0.6 0.2 - 1.0 MG/DL    ALT (SGPT) 35 13 - 56 U/L    AST (SGOT) 26 10 - 38 U/L    Alk.  phosphatase 75 45 - 117 U/L    Protein, total 6.9 6.4 - 8.2 g/dL    Albumin 3.6 3.4 - 5.0 g/dL    Globulin 3.3 2.0 - 4.0 g/dL    A-G Ratio 1.1 0.8 - 1.7     LIPID PANEL   Result Value Ref Range    LIPID PROFILE          Cholesterol, total 194 <200 MG/DL    Triglyceride 91 <150 MG/DL    HDL Cholesterol 72 (H) 40 - 60 MG/DL    LDL, calculated 103.8 (H) 0 - 100 MG/DL    VLDL, calculated 18.2 MG/DL    CHOL/HDL Ratio 2.7 0 - 5.0     VITAMIN D, 25 HYDROXY   Result Value Ref Range    Vitamin D 25-Hydroxy 26.4 (L) 30 - 100 ng/mL   TSH AND FREE T4   Result Value Ref Range    TSH 0.70 0.36 - 3.74 uIU/mL    T4, Free 1.1 0.7 - 1.5 NG/DL   HEMOGLOBIN A1C W/O EAG   Result Value Ref Range    Hemoglobin A1c 5.7 (H) 4.2 - 5.6 %   SED RATE (ESR)   Result Value Ref Range    Sed rate, automated 14 0 - 30 mm/hr   VITAMIN B12 & FOLATE   Result Value Ref Range    Vitamin B12 343 211 - 911 pg/mL    Folate 19.9 (H) 3.10 - 17.50 ng/mL     Assessment and Plan  Diagnoses and all orders for this visit:    1. Other viral warts  -     REFERRAL TO DERMATOLOGY    2. Chronic left shoulder pain  Ortho and pain management as planned    3. Moderate major depression ([address]  Continue with present plan of care    4. Other iron deficiency anemia  -     ferrous sulfate (IRON) 325 mg (65 mg iron) EC tablet; Take 1 Tab by mouth Daily (before breakfast). -     ascorbic acid, vitamin C, (VITAMIN C) 250 mg tablet; Take 1 Tab by mouth daily. After care summary printed and reviewed with patient. Plan reviewed with patient. Questions answered. Patient verbalized understanding of plan and is in agreement with plan. Patient to follow up in one month or earlier if symptoms worsen. Return to work letter given. Encouraged the use of my chart.     OFELIA Lim-C

## 2020-03-11 DIAGNOSIS — G89.29 CHRONIC LEFT SHOULDER PAIN: ICD-10-CM

## 2020-03-11 DIAGNOSIS — M25.512 CHRONIC LEFT SHOULDER PAIN: ICD-10-CM

## 2020-03-13 ENCOUNTER — OFFICE VISIT (OUTPATIENT)
Dept: CARDIOLOGY CLINIC | Age: 55
End: 2020-03-13

## 2020-03-13 VITALS
SYSTOLIC BLOOD PRESSURE: 150 MMHG | BODY MASS INDEX: 32.44 KG/M2 | DIASTOLIC BLOOD PRESSURE: 110 MMHG | HEART RATE: 88 BPM | WEIGHT: 190 LBS | HEIGHT: 64 IN | OXYGEN SATURATION: 97 %

## 2020-03-13 DIAGNOSIS — R07.9 CHEST PAIN, UNSPECIFIED TYPE: Primary | ICD-10-CM

## 2020-03-13 DIAGNOSIS — I10 HYPERTENSION, UNSPECIFIED TYPE: ICD-10-CM

## 2020-03-13 NOTE — PROGRESS NOTES
Alva Parson    CC: CP    HPI    Alva Parson is a 48 y.o. extremely pleasant  female with no known heart disease here for evaluation of persistent chest discomfort and shortness of breath. As you know patient has noticed pressure in her chest off and on for almost a year now. Describes a heavy pressure in the center and she tends to take a deep breath and hold it in and can make the pain go away. She also has some swelling in her legs and complains of dizziness. She might feel some shortness of breath but is not really with exertion. Actually she does not believe any of her symptoms are really with exertion but admits she is really not been able to be active due to issues with her shoulder. Takes quite a bit of medications for musculoskeletal problems but wanted to make sure that her heart was okay as her mother had premature coronary disease in her 46s. Otherwise she has not passed out and denies palpitations. It should be noted that she has been on Norvasc for many years but was recently increased to 10 mg daily is unclear when this medication change was made and patient herself was unable to tell me. She thinks it contributes to LE edema. I sent her for an echocardiogram and pharm nuc due to her persistent symptoms. While her nuclear stress test was read as abnormal, I personally reviewed the images myself and disagree with this. Very poor image acquisition with what looks like processing errors and attenuation artifact. CV RFs: HTN, +FH premature CAD    No past medical history on file. Past Surgical History:   Procedure Laterality Date    HX SHOULDER ARTHROSCOPY Left 2017 and oct 2018       Current Outpatient Medications   Medication Sig Dispense Refill    tiZANidine (ZANAFLEX) 4 mg tablet Take 4 mg by mouth two (2) times daily as needed.  ferrous sulfate (IRON) 325 mg (65 mg iron) EC tablet Take 1 Tab by mouth Daily (before breakfast).  90 Tab 1    ascorbic acid, vitamin C, (VITAMIN C) 250 mg tablet Take 1 Tab by mouth daily. 90 Tab 1    metoprolol succinate (TOPROL-XL) 50 mg XL tablet Take 1 Tab by mouth daily. 30 Tab 4    gabapentin (NEURONTIN) 300 mg capsule TAKE 1 CAPSULE BY MOUTH AT DINNER TIME AND 1 CAPSULE BY MOUTH AT BEDTIME  0    DULoxetine (CYMBALTA) 60 mg capsule duloxetine 60 mg capsule,delayed release   TAKE 1 CAPSULE(S) EVERY DAY BY ORAL ROUTE FOR 15 DAYS.  celecoxib (CELEBREX) 200 mg capsule Take 200 mg by mouth.  escitalopram oxalate (LEXAPRO) 20 mg tablet Take 1 Tab by mouth daily.  90 Tab 3       Allergies   Allergen Reactions    Prednisone Other (comments)     Coughs up blood         Social History     Socioeconomic History    Marital status: SINGLE     Spouse name: Not on file    Number of children: Not on file    Years of education: Not on file    Highest education level: Not on file   Occupational History    Not on file   Social Needs    Financial resource strain: Not on file    Food insecurity     Worry: Not on file     Inability: Not on file    Transportation needs     Medical: Not on file     Non-medical: Not on file   Tobacco Use    Smoking status: Never Smoker    Smokeless tobacco: Never Used   Substance and Sexual Activity    Alcohol use: No     Frequency: Never    Drug use: No    Sexual activity: Yes     Partners: Male   Lifestyle    Physical activity     Days per week: Not on file     Minutes per session: Not on file    Stress: Not on file   Relationships    Social connections     Talks on phone: Not on file     Gets together: Not on file     Attends Jain service: Not on file     Active member of club or organization: Not on file     Attends meetings of clubs or organizations: Not on file     Relationship status: Not on file    Intimate partner violence     Fear of current or ex partner: Not on file     Emotionally abused: Not on file     Physically abused: Not on file     Forced sexual activity: Not on file Other Topics Concern    Not on file   Social History Narrative    Not on file        FH: + premature CAD, no SCD    Review of Systems    14 pt Review of Systems is negative unless otherwise mentioned in the HPI. Wt Readings from Last 3 Encounters:   03/13/20 86.2 kg (190 lb)   02/27/20 87.5 kg (193 lb)   11/29/19 88.5 kg (195 lb)     Temp Readings from Last 3 Encounters:   02/27/20 97.6 °F (36.4 °C) (Oral)   11/26/19 97.5 °F (36.4 °C) (Oral)   11/19/19 98.2 °F (36.8 °C) (Oral)     BP Readings from Last 3 Encounters:   03/13/20 (!) 150/110   02/27/20 124/84   11/29/19 (!) 150/100     Pulse Readings from Last 3 Encounters:   03/13/20 88   02/27/20 83   11/29/19 (!) 106     Physical Exam:    Visit Vitals  BP (!) 150/110   Pulse 88   Ht 5' 4\" (1.626 m)   Wt 86.2 kg (190 lb)   SpO2 97%   BMI 32.61 kg/m²      Physical Exam  Constitutional:       General: She is not in acute distress. Appearance: She is well-developed. She is not diaphoretic. HENT:      Head: Normocephalic and atraumatic. Eyes:      Pupils: Pupils are equal, round, and reactive to light. Cardiovascular:      Rate and Rhythm: Normal rate and regular rhythm. Heart sounds: Normal heart sounds. No murmur. No friction rub. No gallop. Comments: ++some tenderness to palpation anterior chest wall  Pulmonary:      Effort: Pulmonary effort is normal. No respiratory distress. Breath sounds: Normal breath sounds. No wheezing or rales. Chest:      Chest wall: No tenderness. Abdominal:      General: Bowel sounds are normal.      Palpations: Abdomen is soft. Musculoskeletal:         General: No tenderness. Skin:     General: Skin is warm and dry. Neurological:      Mental Status: She is alert and oriented to person, place, and time.          EKG today shows: NSR, normal axis and intervals, no ST segment abnormalities    10/29/19   NUCLEAR CARDIAC STRESS TEST 10/30/2019 10/30/2019    Narrative · Baseline ECG: Normal sinus rhythm. · Inconclusive stress test.  · Gated SPECT: Left ventricular function post-stress was normal.   Calculated ejection fraction is 66%. Evidence of transient ischemic   dilation (TID). The TID ratio is 1.64. · Left ventricular perfusion is abnormal.  · Myocardial perfusion imaging defect 1: There is a defect that is small   in size with a moderate reduction in uptake present in the apical lateral   location(s) that is reversible. There is normal wall motion in the defect   area. The defect appears to be ischemia. · Myocardial perfusion imaging defect 2: There is a defect that is   moderate in size with a moderate reduction in uptake affecting the mid   inferolateral location(s) that is reversible. There is normal wall motion   in the defect area. The defect appears to be ischemia. Signed by: Neena Chiang MD     10/29/19   ECHO ADULT COMPLETE 10/29/2019 10/29/2019    Narrative · Left Ventricle: Normal cavity size and systolic function (ejection   fraction normal). Upper normal wall thickness. The estimated ejection   fraction is 61 - 65%. There is mild (grade 1) left ventricular diastolic   dysfunction. · Tricuspid Valve: Normal valve structure and no stenosis. Trace tricuspid   valve regurgitation. Pulmonary arterial systolic pressure is 40.7 mmHg. There is no evidence of pulmonary hypertension. Signed by: Neena Chiang MD         Impression and Plan:  Letha Vazquez is a 48 y. o. with:    1.) Atypical but persistent CP  2.) HTN, uncontrolled  3.) +FH premature CAD  4.) Severe MSK shoulder pain s/p surgery with decreased ROM, very well could be contributing/ cause of #1  5.) Normal echo   6.) suspect false positive nuc/ processing error    1.) Started Metoprolol 50 SR for better BP control last time, if element of angina should also improve  2.) I personally think her nuc is a false positive but due to her continued symptoms, I did offer her [facility]- she preferred trying medical therapy which is very reasonable as her symptoms are quite atypical for ACS  3.) Overall she is much improved on BB, will continue current therapy- we can try increasing it in future and I asked her to call me sooner if she'd like to try that (we have room with her BP and HR today) but id be worried about side effects for her  4.) RTC 2 months recheck CP    Thank you for allowing me to participate in the care of your patient, please do not hesitate to call with questions or concerns.     Kindest Regards,    Michelle Poon, DO

## 2020-03-13 NOTE — PROGRESS NOTES
Franklyn Yousif presents today for   Chief Complaint   Patient presents with    Chest Pain (Angina)     3 MONTH F/U -     Hypertension       Franklyn Yousif preferred language for health care discussion is english/other. Is someone accompanying this pt? no    Is the patient using any DME equipment during [address]? no    Depression Screening:  3 most recent PHQ Screens 2/27/2020   PHQ Not Done -   Little interest or pleasure in doing things More than half the days   Feeling down, depressed, irritable, or hopeless Nearly every day   Total Score PHQ 2 5   Trouble falling or staying asleep, or sleeping too much Several days   Feeling tired or having little energy Several days   Poor appetite, weight loss, or overeating -   Feeling bad about yourself - or that you are a failure or have let yourself or your family down Several days   Trouble concentrating on things such as school, work, reading, or watching TV Several days   Moving or speaking so slowly that other people could have noticed; or the opposite being so fidgety that others notice Not at all   Thoughts of being better off dead, or hurting yourself in some way Several days   PHQ 9 Score -   How difficult have these problems made it for you to do your work, take care of your home and get along with others Somewhat difficult       Learning Assessment:  Learning Assessment 1/3/2019   PRIMARY LEARNER Patient   HIGHEST LEVEL OF EDUCATION - PRIMARY LEARNER  GRADUATED HIGH SCHOOL OR GED   BARRIERS PRIMARY LEARNER NONE   CO-LEARNER CAREGIVER No   PRIMARY LANGUAGE ENGLISH   LEARNER PREFERENCE PRIMARY LISTENING   ANSWERED BY self   RELATIONSHIP SELF       Abuse Screening:  Abuse Screening Questionnaire 2/27/2020   Do you ever feel afraid of your partner? N   Are you in a relationship with someone who physically or mentally threatens you? N   Is it safe for you to go home? Y       Fall Risk  Fall Risk Assessment, last 12 mths 1/22/2019   Able to walk?  Yes   Fall in past 15 months? No       Pt currently taking Anticoagulant therapy? no    Coordination of Care:  1. Have you been to the ER, urgent care clinic since your last visit? Hospitalized since your last visit? no    2. Have you seen or consulted any other health care providers outside of the [facility] since your last visit? Include any pap smears or colon screening.  no

## 2020-03-16 ENCOUNTER — TELEPHONE (OUTPATIENT)
Dept: FAMILY MEDICINE CLINIC | Age: 55
End: 2020-03-16

## 2020-03-16 RX ORDER — CYCLOBENZAPRINE HCL 5 MG
5 TABLET ORAL
Qty: 30 TAB | Refills: 0 | OUTPATIENT
Start: 2020-03-16

## 2020-03-16 NOTE — TELEPHONE ENCOUNTER
Called pt to inquire if she was take Flexeril or Tinzanidine. Vandana Joni that she thinks she was taking both. Discussed with her that she was advised to not do that. Said she will continue to take Tinzanidine.

## 2020-04-28 RX ORDER — METOPROLOL SUCCINATE 50 MG/1
TABLET, EXTENDED RELEASE ORAL
Qty: 30 TAB | Refills: 5 | Status: SHIPPED | OUTPATIENT
Start: 2020-04-28 | End: 2020-08-21

## 2020-05-20 ENCOUNTER — VIRTUAL VISIT (OUTPATIENT)
Dept: FAMILY MEDICINE CLINIC | Age: 55
End: 2020-05-20

## 2020-05-20 DIAGNOSIS — Z13.0 SCREENING FOR ENDOCRINE, METABOLIC AND IMMUNITY DISORDER: ICD-10-CM

## 2020-05-20 DIAGNOSIS — G89.29 CHRONIC LEFT SHOULDER PAIN: Primary | ICD-10-CM

## 2020-05-20 DIAGNOSIS — F33.2 SEVERE EPISODE OF RECURRENT MAJOR DEPRESSIVE DISORDER, WITHOUT PSYCHOTIC FEATURES (HCC): ICD-10-CM

## 2020-05-20 DIAGNOSIS — Z13.29 SCREENING FOR ENDOCRINE, METABOLIC AND IMMUNITY DISORDER: ICD-10-CM

## 2020-05-20 DIAGNOSIS — E55.9 VITAMIN D DEFICIENCY: ICD-10-CM

## 2020-05-20 DIAGNOSIS — I10 HYPERTENSION, UNSPECIFIED TYPE: ICD-10-CM

## 2020-05-20 DIAGNOSIS — R73.03 PREDIABETES: ICD-10-CM

## 2020-05-20 DIAGNOSIS — Z13.228 SCREENING FOR ENDOCRINE, METABOLIC AND IMMUNITY DISORDER: ICD-10-CM

## 2020-05-20 DIAGNOSIS — E78.2 MIXED HYPERLIPIDEMIA: ICD-10-CM

## 2020-05-20 DIAGNOSIS — M25.512 CHRONIC LEFT SHOULDER PAIN: Primary | ICD-10-CM

## 2020-05-20 RX ORDER — ERGOCALCIFEROL 1.25 MG/1
50000 CAPSULE ORAL
Qty: 8 CAP | Refills: 0 | Status: SHIPPED | OUTPATIENT
Start: 2020-05-20 | End: 2021-02-02 | Stop reason: ALTCHOICE

## 2020-05-20 RX ORDER — NAPROXEN 500 MG/1
TABLET, DELAYED RELEASE ORAL
COMMUNITY
End: 2022-07-08

## 2020-05-20 NOTE — PROGRESS NOTES
Jesi [place] presents today for   Chief Complaint   Patient presents with    Hypertension     follow up    Depression     follow up       Is someone accompanying this pt? No    Is the patient using any DME equipment during [facility]? No    Depression Screening:  3 most recent PHQ Screens 5/20/2020   PHQ Not Done -   Little interest or pleasure in doing things Several days   Feeling down, depressed, irritable, or hopeless Nearly every day   Total Score PHQ 2 4   Trouble falling or staying asleep, or sleeping too much Several days   Feeling tired or having little energy Several days   Poor appetite, weight loss, or overeating Several days   Feeling bad about yourself - or that you are a failure or have let yourself or your family down Several days   Trouble concentrating on things such as school, work, reading, or watching TV Several days   Moving or speaking so slowly that other people could have noticed; or the opposite being so fidgety that others notice Not at all   Thoughts of being better off dead, or hurting yourself in some way Several days   PHQ 9 Score 10   How difficult have these problems made it for you to do your work, take care of your home and get along with others Somewhat difficult       Learning Assessment:  Learning Assessment 1/3/2019   PRIMARY LEARNER Patient   HIGHEST LEVEL OF EDUCATION - PRIMARY LEARNER  GRADUATED HIGH SCHOOL OR GED   BARRIERS PRIMARY LEARNER NONE   CO-LEARNER CAREGIVER No   PRIMARY LANGUAGE ENGLISH   LEARNER PREFERENCE PRIMARY LISTENING   ANSWERED BY self   RELATIONSHIP SELF       Abuse Screening:  Abuse Screening Questionnaire 2/27/2020   Do you ever feel afraid of your partner? N   Are you in a relationship with someone who physically or mentally threatens you? N   Is it safe for you to go home?  Y         Health Maintenance Due   Topic Date Due    DTaP/Tdap/Td series (1 - Tdap) 02/01/1987    Shingrix Vaccine Age 50> (1 of 2) 02/01/2016    Breast Cancer Screen Mammogram 02/01/2016    PAP AKA CERVICAL CYTOLOGY  09/12/2020   . Coordination of Care  1. Have you been to the ER, urgent care clinic since your last visit? Hospitalized since your last visit? No    2. Have you seen or consulted any other health care providers outside of the [address] [place] [address] since your last visit? Include any pap smears or colon screening. No      Advance Directive:  1. Do you have an advance directive in place? Patient Reply:No     2. If not, would you like material regarding how to put one in place?  Patient Reply: No

## 2020-05-20 NOTE — PROGRESS NOTES
Jenn Jackson is a 48 y.o. female evaluated via audio only technology on 5/20/2020. Consent: She and/or her health care decision maker is aware that she may receive a bill for this audio only encounter, depending on her insurance coverage, and has provided verbal consent to proceed: Yes    I communicated with the patient and/or health care decision maker about the nature and details of the following:  Assessment & Plan:   Diagnoses and all orders for this visit:    1. Chronic left shoulder pain  Continue with present plan of care    2. Severe episode of recurrent major depressive disorder, without psychotic features ([facility] [address]  Continue with present plan of care. Encouraged pt to continue to work on finding a therapist.  Reviewed emergency precautions for thoughts of self harm including reaching out to support system, crisis line, 911    3. Hypertension, unspecified type  -     CBC WITH AUTOMATED DIFF; Future  -     METABOLIC PANEL, COMPREHENSIVE; Future  Continue with present plan of care    4. Vitamin D deficiency  -     ergocalciferol (ERGOCALCIFEROL) 1,250 mcg (50,000 unit) capsule; Take 1 Cap by mouth every seven (7) days. -     VITAMIN D, 25 HYDROXY; Future  Discussed with pt that she needs to have lab rechecked soon    5. Prediabetes  -     HEMOGLOBIN A1C WITH EAG; Future    6. Mixed hyperlipidemia  -     LIPID PANEL; Future    7. Screening for endocrine, metabolic and immunity disorder  -     TSH 3RD GENERATION; Future        12  Subjective:   Jenn Jackson is a 48 y.o. female who was seen for Hypertension (follow up) and Depression (follow up)    Pt presents for a virtual telephone visit follow up appointment. Not able to get ipad to work for this visit. Pt reports that she is basically in a holding pattern with the COVID-19 pandemic situation. Is supposed to get an appointment with an orthopedic doctor in [facility] but not able to presently. Reports that shoulder is no worse/ no better.   Continues to received tx through pain management    Remains very depressed. Says she has good and bad days. Is able to reach out to her children and friends for support. Does have thoughts of self harm at times, but no definite plan and says that she feels that she can reach out to someone for assistance before acting on any thoughts of self harm. Said that she just was talking with a friend who told her to go take a walk to get some fresh air, and she intends to do so. Was also in the midst of finding a new therapist before the COVID-19 pandemic situation started. Says that she feels that she will be able to find a new therapist    Has not been able to check her blood pressure as she doesn't own a cuff    Asking for a refill on Vitamin D. Last lab work was done in November. Vitamin D slightly low at that time        Prior to Admission medications    Medication Sig Start Date End Date Taking? Authorizing Provider   naproxen EC (EC-Naproxen) 500 mg EC tablet naproxen 500 mg tablet   Take 1 tablet twice a day by oral route as needed for 60 days. Yes Provider, Historical   ergocalciferol (ERGOCALCIFEROL) 1,250 mcg (50,000 unit) capsule Take 1 Cap by mouth every seven (7) days. 5/20/20  Yes Mark Anthony NP   metoprolol succinate (TOPROL-XL) 50 mg XL tablet TAKE 1 TABLET BY MOUTH EVERY DAY 4/28/20  Yes Chintan Bond, NP   tiZANidine (ZANAFLEX) 4 mg tablet Take 4 mg by mouth two (2) times daily as needed. Yes Provider, Historical   ferrous sulfate (IRON) 325 mg (65 mg iron) EC tablet Take 1 Tab by mouth Daily (before breakfast). 2/27/20  Yes Mark Anthony NP   ascorbic acid, vitamin C, (VITAMIN C) 250 mg tablet Take 1 Tab by mouth daily.  2/27/20  Yes Mark Anthony NP   gabapentin (NEURONTIN) 300 mg capsule TAKE 1 CAPSULE BY MOUTH AT DINNER TIME AND 1 CAPSULE BY MOUTH AT BEDTIME 9/11/19  Yes Provider, Historical   DULoxetine (CYMBALTA) 60 mg capsule duloxetine 60 mg capsule,delayed release   TAKE 1 CAPSULE(S) EVERY DAY BY ORAL ROUTE FOR 15 DAYS. Yes Provider, Historical   escitalopram oxalate (LEXAPRO) 20 mg tablet Take 1 Tab by mouth daily. 1/22/19  Yes Sadi Luque MD   celecoxib (CELEBREX) 200 mg capsule Take 200 mg by mouth. 3/29/16   Provider, Historical     Allergies   Allergen Reactions    Prednisone Other (comments)     Coughs up blood         Patient Active Problem List   Diagnosis Code    Moderate major depression ([facility] [address] F32.1    Depression F32.9    HTN (hypertension) I10    Mixed hyperlipidemia E78.2    Uterine leiomyoma D25.9    Vitamin D deficiency E55.9    GRETCHEN (iron deficiency anemia) D50.9    Other chronic pain G89.29    Chronic left shoulder pain M25.512, G89.29    Other chest pain R07.89    RLS (restless legs syndrome) G25.81    Obesity (BMI 30-39. 9) E66.9    Other viral warts B07.8    Prediabetes R73.03    Screening for endocrine, metabolic and immunity disorder Z13.29, Z13.228, Z13.0     Patient Active Problem List    Diagnosis Date Noted    Prediabetes 05/20/2020    Screening for endocrine, metabolic and immunity disorder 05/20/2020    Other viral warts 02/27/2020    Obesity (BMI 30-39.9) 11/26/2019    RLS (restless legs syndrome) 09/12/2019    Other chest pain 08/14/2019    Other chronic pain 04/25/2019    Chronic left shoulder pain 04/25/2019    Moderate major depression ([facility] [address] 01/03/2019    Mixed hyperlipidemia 03/20/2018    Vitamin D deficiency 03/20/2018    Uterine leiomyoma 09/12/2017    Depression 06/15/2017    HTN (hypertension) 06/15/2017    GRETCHEN (iron deficiency anemia) 06/15/2017     Current Outpatient Medications   Medication Sig Dispense Refill    naproxen EC (EC-Naproxen) 500 mg EC tablet naproxen 500 mg tablet   Take 1 tablet twice a day by oral route as needed for 60 days.  ergocalciferol (ERGOCALCIFEROL) 1,250 mcg (50,000 unit) capsule Take 1 Cap by mouth every seven (7) days.  8 Cap 0    metoprolol succinate (TOPROL-XL) 50 mg XL tablet TAKE 1 TABLET BY MOUTH EVERY DAY 30 Tab 5    tiZANidine (ZANAFLEX) 4 mg tablet Take 4 mg by mouth two (2) times daily as needed.  ferrous sulfate (IRON) 325 mg (65 mg iron) EC tablet Take 1 Tab by mouth Daily (before breakfast). 90 Tab 1    ascorbic acid, vitamin C, (VITAMIN C) 250 mg tablet Take 1 Tab by mouth daily. 90 Tab 1    gabapentin (NEURONTIN) 300 mg capsule TAKE 1 CAPSULE BY MOUTH AT DINNER TIME AND 1 CAPSULE BY MOUTH AT BEDTIME  0    DULoxetine (CYMBALTA) 60 mg capsule duloxetine 60 mg capsule,delayed release   TAKE 1 CAPSULE(S) EVERY DAY BY ORAL ROUTE FOR 15 DAYS.  escitalopram oxalate (LEXAPRO) 20 mg tablet Take 1 Tab by mouth daily. 90 Tab 3    celecoxib (CELEBREX) 200 mg capsule Take 200 mg by mouth. Allergies   Allergen Reactions    Prednisone Other (comments)     Coughs up blood       No past medical history on file. Past Surgical History:   Procedure Laterality Date    HX SHOULDER ARTHROSCOPY Left 2017 and oct 2018     Family History   Problem Relation Age of Onset    Diabetes Mother     Heart Attack Mother     Diabetes Father     Lupus Sister     Depression Other     Depression Paternal Uncle      Social History     Tobacco Use    Smoking status: Never Smoker    Smokeless tobacco: Never Used   Substance Use Topics    Alcohol use: No     Frequency: Never       Review of Systems   Constitutional: Negative. Respiratory: Negative. Cardiovascular: Negative. Musculoskeletal:        Left shoulder pain- chronic   Neurological: Negative. Psychiatric/Behavioral: Positive for depression. Negative for suicidal ideas. I affirm this is a Patient-Initiated Episode with a Patient who has not had a related appointment within my department in the past 7 days or scheduled within the next 24 hours.     Total Time: minutes: 11-20 minutes    Note: not billable if this call serves to triage the patient into an appointment for the relevant concern      Carlos Teague, SALVADOR

## 2020-06-01 LAB
STRESS BASELINE HR: 68 BPM
STRESS ESTIMATED WORKLOAD: 1 METS
STRESS EXERCISE DUR MIN: NORMAL
STRESS PEAK DIAS BP: 101 MMHG
STRESS PEAK SYS BP: 171 MMHG
STRESS PERCENT HR ACHIEVED: 67 %
STRESS POST PEAK HR: 112 BPM
STRESS RATE PRESSURE PRODUCT: NORMAL BPM*MMHG
STRESS ST DEPRESSION: 0 MM
STRESS ST ELEVATION: 0 MM
STRESS TARGET HR: 167 BPM

## 2020-06-19 PROBLEM — Z13.29 SCREENING FOR ENDOCRINE, METABOLIC AND IMMUNITY DISORDER: Status: RESOLVED | Noted: 2020-05-20 | Resolved: 2020-06-19

## 2020-06-19 PROBLEM — Z13.228 SCREENING FOR ENDOCRINE, METABOLIC AND IMMUNITY DISORDER: Status: RESOLVED | Noted: 2020-05-20 | Resolved: 2020-06-19

## 2020-06-19 PROBLEM — Z13.0 SCREENING FOR ENDOCRINE, METABOLIC AND IMMUNITY DISORDER: Status: RESOLVED | Noted: 2020-05-20 | Resolved: 2020-06-19

## 2020-06-26 ENCOUNTER — TELEPHONE (OUTPATIENT)
Dept: FAMILY MEDICINE CLINIC | Age: 55
End: 2020-06-26

## 2020-06-26 NOTE — TELEPHONE ENCOUNTER
Pt called and stated that she would like to make an appointment, when patient was asked what she is being seen for patient stated that she has been having headaches and chest pains, pt also had a slight cough during questioning.  No mention of fever or being in contact with COVID patient

## 2020-06-26 NOTE — TELEPHONE ENCOUNTER
Patient stated she was having a constant headache that started 2 days ago with a pain level of 7. She c/o chest pains off and on which also started 2 days ago with pain level of 7. After obtaining a cardiac triage from patient she was advised to go to an urgent care or ER. She stated she understood and would go.

## 2020-07-28 ENCOUNTER — VIRTUAL VISIT (OUTPATIENT)
Dept: FAMILY MEDICINE CLINIC | Age: 55
End: 2020-07-28

## 2020-07-28 DIAGNOSIS — R51.9 INTRACTABLE EPISODIC HEADACHE, UNSPECIFIED HEADACHE TYPE: Primary | ICD-10-CM

## 2020-07-28 NOTE — PROGRESS NOTES
Lilli Amin is a 48 y.o. female, evaluated via audio-only technology on 7/28/2020 for Headache (was constant but has gotten better)  . Reports she does still have headaches from time to time and she had to go to [facility]. She states [facility] told her that she was fine. Reports she was taking some Aleve but she stopped taking as this was not helping plus she was taking other prescribed pain medication. She does admit that tylenol did help some. Headache is on the left side of her head. The headache is achy and has improved but is still mildly present. Reports she stopped taking the Toprol-XL 2-3 days ago and she feels better. She has an appointment with cardiology tomorrow. She still has some random palpitations. Assessment & Plan:   Diagnoses and all orders for this visit:    1. Intractable episodic headache, unspecified headache type  -     REFERRAL TO NEUROLOGY    Follow up with cardiology. OTC tylenol for headache. 12  Subjective:       Prior to Admission medications    Medication Sig Start Date End Date Taking? Authorizing Provider   naproxen EC (EC-Naproxen) 500 mg EC tablet naproxen 500 mg tablet   Take 1 tablet twice a day by oral route as needed for 60 days. Yes Provider, Historical   ergocalciferol (ERGOCALCIFEROL) 1,250 mcg (50,000 unit) capsule Take 1 Cap by mouth every seven (7) days. 5/20/20  Yes [facility] Michel, NP   metoprolol succinate (TOPROL-XL) 50 mg XL tablet TAKE 1 TABLET BY MOUTH EVERY DAY 4/28/20  Yes [place] Ven[place] John P, NP   tiZANidine (ZANAFLEX) 4 mg tablet Take 4 mg by mouth two (2) times daily as needed. Yes Provider, Historical   ferrous sulfate (IRON) 325 mg (65 mg iron) EC tablet Take 1 Tab by mouth Daily (before breakfast). 2/27/20  Yes Jeffry Michel, NP   ascorbic acid, vitamin C, (VITAMIN C) 250 mg tablet Take 1 Tab by mouth daily.  2/27/20  Yes Maria Luisa Galaviz, NP   gabapentin (NEURONTIN) 300 mg capsule TAKE 1 CAPSULE BY MOUTH AT [facility] TIME AND 1 CAPSULE BY MOUTH AT BEDTIME 9/11/19  Yes Provider, Historical   DULoxetine (CYMBALTA) 60 mg capsule duloxetine 60 mg capsule,delayed release   TAKE 1 CAPSULE(S) EVERY DAY BY ORAL ROUTE FOR 15 DAYS. Yes Provider, Historical   celecoxib (CELEBREX) 200 mg capsule Take 200 mg by mouth. 3/29/16  Yes Provider, Historical   escitalopram oxalate (LEXAPRO) 20 mg tablet Take 1 Tab by mouth daily. 1/22/19  Yes Doron Alex MD     Patient Active Problem List   Diagnosis Code    Moderate major depression ([facility] [address] F32.1    Depression F32.9    HTN (hypertension) I10    Mixed hyperlipidemia E78.2    Uterine leiomyoma D25.9    Vitamin D deficiency E55.9    GRETCHEN (iron deficiency anemia) D50.9    Other chronic pain G89.29    Chronic left shoulder pain M25.512, G89.29    Other chest pain R07.89    RLS (restless legs syndrome) G25.81    Obesity (BMI 30-39. 9) E66.9    Other viral warts B07.8    Prediabetes R73.03     Patient Active Problem List    Diagnosis Date Noted    Prediabetes 05/20/2020    Other viral warts 02/27/2020    Obesity (BMI 30-39.9) 11/26/2019    RLS (restless legs syndrome) 09/12/2019    Other chest pain 08/14/2019    Other chronic pain 04/25/2019    Chronic left shoulder pain 04/25/2019    Moderate major depression ([facility] [address] 01/03/2019    Mixed hyperlipidemia 03/20/2018    Vitamin D deficiency 03/20/2018    Uterine leiomyoma 09/12/2017    Depression 06/15/2017    HTN (hypertension) 06/15/2017    GRETCHEN (iron deficiency anemia) 06/15/2017     Current Outpatient Medications   Medication Sig Dispense Refill    naproxen EC (EC-Naproxen) 500 mg EC tablet naproxen 500 mg tablet   Take 1 tablet twice a day by oral route as needed for 60 days.  ergocalciferol (ERGOCALCIFEROL) 1,250 mcg (50,000 unit) capsule Take 1 Cap by mouth every seven (7) days.  8 Cap 0    metoprolol succinate (TOPROL-XL) 50 mg XL tablet TAKE 1 TABLET BY MOUTH EVERY DAY 30 Tab 5    tiZANidine (ZANAFLEX) 4 mg tablet Take 4 mg by mouth two (2) times daily as needed.  ferrous sulfate (IRON) 325 mg (65 mg iron) EC tablet Take 1 Tab by mouth Daily (before breakfast). 90 Tab 1    ascorbic acid, vitamin C, (VITAMIN C) 250 mg tablet Take 1 Tab by mouth daily. 90 Tab 1    gabapentin (NEURONTIN) 300 mg capsule TAKE 1 CAPSULE BY MOUTH AT DINNER TIME AND 1 CAPSULE BY MOUTH AT BEDTIME  0    DULoxetine (CYMBALTA) 60 mg capsule duloxetine 60 mg capsule,delayed release   TAKE 1 CAPSULE(S) EVERY DAY BY ORAL ROUTE FOR 15 DAYS.  celecoxib (CELEBREX) 200 mg capsule Take 200 mg by mouth.  escitalopram oxalate (LEXAPRO) 20 mg tablet Take 1 Tab by mouth daily. 90 Tab 3     Allergies   Allergen Reactions    Prednisone Other (comments)     Coughs up blood       No past medical history on file. Past Surgical History:   Procedure Laterality Date    HX SHOULDER ARTHROSCOPY Left 2017 and oct 2018     Family History   Problem Relation Age of Onset    Diabetes Mother     Heart Attack Mother     Diabetes Father     Lupus Sister     Depression Other     Depression Paternal Uncle      Social History     Tobacco Use    Smoking status: Never Smoker    Smokeless tobacco: Never Used   Substance Use Topics    Alcohol use: No     Frequency: Never       Review of Systems   Eyes: Negative for blurred vision. Respiratory: Negative for shortness of breath. Cardiovascular: Positive for palpitations. Negative for chest pain. Gastrointestinal: Negative for nausea and vomiting. Musculoskeletal: Negative for falls. Neurological: Positive for headaches. Negative for dizziness. No flowsheet data found. Yoly Cabral, who was evaluated through a patient-initiated, synchronous (real-time) audio only encounter, and/or her healthcare decision maker, is aware that it is a billable service, with coverage as determined by her insurance carrier. She provided verbal consent to proceed: Yes.  She has not had a related appointment within my department in the past 7 days or scheduled within the next 24 hours.       Total Time: minutes: 5-10 minutes    Yazmin Powell NP

## 2020-07-28 NOTE — PROGRESS NOTES
Telma Romero presents today for   Chief Complaint   Patient presents with    Headache     was constant but has gotten better       Telmalesly Romero preferred language for health care discussion is english/other. Is someone accompanying this pt? no    Is the patient using any DME equipment during [address]? no    Depression Screening:  3 most recent PHQ Screens 5/20/2020   PHQ Not Done -   Little interest or pleasure in doing things Several days   Feeling down, depressed, irritable, or hopeless Nearly every day   Total Score PHQ 2 4   Trouble falling or staying asleep, or sleeping too much Several days   Feeling tired or having little energy Several days   Poor appetite, weight loss, or overeating Several days   Feeling bad about yourself - or that you are a failure or have let yourself or your family down Several days   Trouble concentrating on things such as school, work, reading, or watching TV Several days   Moving or speaking so slowly that other people could have noticed; or the opposite being so fidgety that others notice Not at all   Thoughts of being better off dead, or hurting yourself in some way Several days   PHQ 9 Score 10   How difficult have these problems made it for you to do your work, take care of your home and get along with others Somewhat difficult       Learning Assessment:  Learning Assessment 1/3/2019   PRIMARY LEARNER Patient   HIGHEST LEVEL OF EDUCATION - PRIMARY LEARNER  GRADUATED HIGH SCHOOL OR GED   BARRIERS PRIMARY LEARNER NONE   CO-LEARNER CAREGIVER No   PRIMARY LANGUAGE ENGLISH   LEARNER PREFERENCE PRIMARY LISTENING   ANSWERED BY self   RELATIONSHIP SELF       Abuse Screening:  Abuse Screening Questionnaire 2/27/2020   Do you ever feel afraid of your partner? N   Are you in a relationship with someone who physically or mentally threatens you? N   Is it safe for you to go home? Y       Generalized Anxiety  No flowsheet data found.       Health Maintenance Due   Topic Date Due    DTaP/Tdap/Td series (1 - Tdap) 02/01/1987    Shingrix Vaccine Age 50> (1 of 2) 02/01/2016    Breast Cancer Screen Mammogram  02/01/2016    PAP AKA CERVICAL CYTOLOGY  09/12/2020   . Health Maintenance reviewed and discussed and ordered per Provider. Coordination of Care:  1. Have you been to the ER, urgent care clinic since your last visit? Hospitalized since your last visit? Yes, urgent care    2. Have you seen or consulted any other health care providers outside of the [address] [place] [address] since your last visit? Include any pap smears or colon screening.  no      Advance Directive:  Discussed 5/20/20

## 2020-08-21 ENCOUNTER — OFFICE VISIT (OUTPATIENT)
Dept: CARDIOLOGY CLINIC | Age: 55
End: 2020-08-21

## 2020-08-21 VITALS
BODY MASS INDEX: 33.46 KG/M2 | HEIGHT: 64 IN | WEIGHT: 196 LBS | HEART RATE: 85 BPM | OXYGEN SATURATION: 98 % | DIASTOLIC BLOOD PRESSURE: 90 MMHG | SYSTOLIC BLOOD PRESSURE: 142 MMHG

## 2020-08-21 DIAGNOSIS — I10 HYPERTENSION, UNSPECIFIED TYPE: ICD-10-CM

## 2020-08-21 DIAGNOSIS — R07.9 CHEST PAIN, UNSPECIFIED TYPE: Primary | ICD-10-CM

## 2020-08-21 RX ORDER — PROPRANOLOL HYDROCHLORIDE 40 MG/1
TABLET ORAL
Qty: 90 TAB | Refills: 5 | Status: SHIPPED | OUTPATIENT
Start: 2020-08-21 | End: 2021-09-15

## 2020-08-21 NOTE — PROGRESS NOTES
Grace Marie presents today for   Chief Complaint   Patient presents with    Follow-up     1 year follow up        Grace Marie preferred language for health care discussion is english/other. Is someone accompanying this pt? no    Is the patient using any DME equipment during [address]? no    Depression Screening:  3 most recent PHQ Screens 8/21/2020   PHQ Not Done -   Little interest or pleasure in doing things Not at all   Feeling down, depressed, irritable, or hopeless Not at all   Total Score PHQ 2 0   Trouble falling or staying asleep, or sleeping too much -   Feeling tired or having little energy -   Poor appetite, weight loss, or overeating -   Feeling bad about yourself - or that you are a failure or have let yourself or your family down -   Trouble concentrating on things such as school, work, reading, or watching TV -   Moving or speaking so slowly that other people could have noticed; or the opposite being so fidgety that others notice -   Thoughts of being better off dead, or hurting yourself in some way -   PHQ 9 Score -   How difficult have these problems made it for you to do your work, take care of your home and get along with others -       Learning Assessment:  Learning Assessment 7/28/2020   PRIMARY LEARNER Patient   HIGHEST LEVEL OF EDUCATION - PRIMARY LEARNER  GRADUATED HIGH SCHOOL OR GED   BARRIERS PRIMARY LEARNER NONE   CO-LEARNER CAREGIVER No   PRIMARY LANGUAGE ENGLISH    NEED No   LEARNER PREFERENCE PRIMARY DEMONSTRATION   ANSWERED BY patient   RELATIONSHIP SELF       Abuse Screening:  Abuse Screening Questionnaire 8/21/2020   Do you ever feel afraid of your partner? N   Are you in a relationship with someone who physically or mentally threatens you? N   Is it safe for you to go home? Y       Fall Risk  Fall Risk Assessment, last 12 mths 8/21/2020   Able to walk? Yes   Fall in past 12 months? No       Pt currently taking Anticoagulant therapy? no    Coordination of Care:  1.  Have you been to the ER, urgent care clinic since your last visit? Hospitalized since your last visit? no    2. Have you seen or consulted any other health care providers outside of the [address] [place] [address] since your last visit? Include any pap smears or colon screening.  no

## 2020-08-24 DIAGNOSIS — D50.8 OTHER IRON DEFICIENCY ANEMIA: ICD-10-CM

## 2020-08-24 NOTE — TELEPHONE ENCOUNTER
Last appt 7/28/20    Requested Prescriptions     Pending Prescriptions Disp Refills    ascorbic acid, vitamin C, (VITAMIN C) 250 mg tablet 90 Tab 1     Sig: Take 1 Tab by mouth daily.

## 2020-08-26 RX ORDER — ASCORBIC ACID 250 MG
250 TABLET ORAL DAILY
Qty: 90 TAB | Refills: 1 | OUTPATIENT
Start: 2020-08-26

## 2020-09-03 ENCOUNTER — OFFICE VISIT (OUTPATIENT)
Dept: ORTHOPEDIC SURGERY | Age: 55
End: 2020-09-03

## 2020-09-03 VITALS
DIASTOLIC BLOOD PRESSURE: 95 MMHG | RESPIRATION RATE: 16 BRPM | SYSTOLIC BLOOD PRESSURE: 152 MMHG | BODY MASS INDEX: 33.64 KG/M2 | OXYGEN SATURATION: 97 % | HEART RATE: 82 BPM | TEMPERATURE: 96.9 F | HEIGHT: 64 IN

## 2020-09-03 DIAGNOSIS — M65.341 TRIGGER RING FINGER OF RIGHT HAND: ICD-10-CM

## 2020-09-03 DIAGNOSIS — D50.8 OTHER IRON DEFICIENCY ANEMIA: ICD-10-CM

## 2020-09-03 DIAGNOSIS — M65.331 TRIGGER MIDDLE FINGER OF RIGHT HAND: ICD-10-CM

## 2020-09-03 DIAGNOSIS — M20.011 MALLET DEFORMITY OF RIGHT INDEX FINGER: Primary | ICD-10-CM

## 2020-09-03 RX ORDER — TRIAMCINOLONE ACETONIDE 10 MG/ML
10 INJECTION, SUSPENSION INTRA-ARTICULAR; INTRALESIONAL ONCE
Qty: 1 VIAL | Refills: 0
Start: 2020-09-03 | End: 2020-09-03

## 2020-09-03 NOTE — PROGRESS NOTES
Iker Keller is a 48 y.o. female left handed unspecified employment. Worker's Compensation and legal considerations: none filed. Vitals:    09/03/20 1408   BP: (!) 152/95   Pulse: 82   Resp: 16   Temp: 96.9 °F (36.1 °C)   TempSrc: Temporal   SpO2: 97%   Height: 5' 4\" (1.626 m)   PainSc:   8   PainLoc: Hand           Chief Complaint   Patient presents with    Hand Pain     right hand          HPI: Patient comes in today with complaints of right index finger deformity as well as right middle finger and ring finger pain and locking. Date of onset: March 2020    Injury: No    Prior Treatment:  No    Numbness/ Tingling: No      ROS: Review of Systems - General ROS: negative  Psychological ROS: negative  ENT ROS: negative  Allergy and Immunology ROS: negative  Hematological and Lymphatic ROS: negative  Respiratory ROS: no cough, shortness of breath, or wheezing  Cardiovascular ROS: no chest pain or dyspnea on exertion  Gastrointestinal ROS: no abdominal pain, change in bowel habits, or black or bloody stools  Musculoskeletal ROS: positive for - pain in finger - right  Neurological ROS: negative  Dermatological ROS: negative    No past medical history on file. Past Surgical History:   Procedure Laterality Date    HX SHOULDER ARTHROSCOPY Left 2017 and oct 2018       Current Outpatient Medications   Medication Sig Dispense Refill    triamcinolone acetonide (Kenalog) 10 mg/mL injection 1 mL by IntraMUSCular route once for 1 dose. 1 Vial 0    propranoloL (INDERAL) 40 mg tablet Take 1 tablet by mouth up to three times a day as needed. 90 Tab 5    naproxen EC (EC-Naproxen) 500 mg EC tablet naproxen 500 mg tablet   Take 1 tablet twice a day by oral route as needed for 60 days.  ergocalciferol (ERGOCALCIFEROL) 1,250 mcg (50,000 unit) capsule Take 1 Cap by mouth every seven (7) days. 8 Cap 0    tiZANidine (ZANAFLEX) 4 mg tablet Take 4 mg by mouth two (2) times daily as needed.       ferrous sulfate (IRON) 325 mg (65 mg iron) EC tablet Take 1 Tab by mouth Daily (before breakfast). 90 Tab 1    ascorbic acid, vitamin C, (VITAMIN C) 250 mg tablet Take 1 Tab by mouth daily. 90 Tab 1    gabapentin (NEURONTIN) 300 mg capsule TAKE 1 CAPSULE BY MOUTH AT DINNER TIME AND 1 CAPSULE BY MOUTH AT BEDTIME  0    DULoxetine (CYMBALTA) 60 mg capsule duloxetine 60 mg capsule,delayed release   TAKE 1 CAPSULE(S) EVERY DAY BY ORAL ROUTE FOR 15 DAYS.  celecoxib (CELEBREX) 200 mg capsule Take 200 mg by mouth.  escitalopram oxalate (LEXAPRO) 20 mg tablet Take 1 Tab by mouth daily. 90 Tab 3       Allergies   Allergen Reactions    Prednisone Other (comments)     Coughs up blood             PE:     Physical Exam  Vitals signs and nursing note reviewed. Constitutional:       General: She is not in acute distress. Appearance: Normal appearance. She is not ill-appearing. Eyes:      Pupils: Pupils are equal, round, and reactive to light. Neck:      Musculoskeletal: Normal range of motion. Cardiovascular:      Pulses: Normal pulses. Pulmonary:      Effort: Pulmonary effort is normal. No respiratory distress. Abdominal:      General: Abdomen is flat. Musculoskeletal: Normal range of motion. General: Tenderness present. No swelling, deformity or signs of injury. Right lower leg: No edema. Left lower leg: No edema. Skin:     General: Skin is warm and dry. Capillary Refill: Capillary refill takes less than 2 seconds. Findings: No bruising or erythema. Neurological:      General: No focal deficit present. Mental Status: She is alert and oriented to person, place, and time.    Psychiatric:         Mood and Affect: Mood normal.         Behavior: Behavior normal.            Hand: Mild deformity of right index finger with a fixed flexion position of approximately 45 to 50 degrees    Examination L Digit(s) R Digit(s)   1st CMC Tenderness -  -    1st CMC Grind -  -    [place] Nodes -  - [place] Nodes -  -    A1 Pulley Tenderness -  + LF, RF   Triggering -  + LF,RF   UCL Instability -  -    RCL Instability -  -    Lateral Stress Pain -  -    Palmar Cords -  -    Tabletop test -  -    Garrod's Pads -  -     Strength       Pinch Strength         ROM: Full        Imagin/3/2020 plain films of right index finger does not show any acute fracture or dislocation. There is a fixed flexion deformity of the DIP joint. ICD-10-CM ICD-9-CM    1. Mallet deformity of right index finger  M20.011 736.1 AMB POC XRAY, FINGER(S), 2+ VIEWS      REFERRAL TO OCCUPATIONAL THERAPY   2. Trigger middle finger of right hand  M65.331 727.03 TRIAMCINOLONE ACETONIDE INJ      triamcinolone acetonide (Kenalog) 10 mg/mL injection      INJECT TENDON SHEATH/LIGAMENT   3. Trigger ring finger of right hand  M65.341 727.03 TRIAMCINOLONE ACETONIDE INJ      INJECT TENDON SHEATH/LIGAMENT      triamcinolone acetonide (Kenalog) 10 mg/mL injection         Plan:     Right middle and ring finger A1 pulley injections. OT for splint of right index finger mallet deformity. Follow-up and Dispositions    · Return in about 4 weeks (around 10/1/2020) for Reevaluation splint check.           Plan was reviewed with patient, who verbalized agreement and understanding of the plan     [facility] NOTE        Chart reviewed for the following:   IGilles DO, have reviewed the History, Physical and updated the Allergic reactions for [address] performed immediately prior to start of procedure:   Gilles MENDEZ DO, have performed the following reviews on Hermila Shorten prior to the start of the procedure:            * Patient was identified by name and date of birth   * Agreement on procedure being performed was verified  * Risks and Benefits explained to the patient  * Procedure site verified and marked as necessary  * Patient was positioned for comfort  * Consent was signed and verified     Time: 14:30      Date of procedure: 9/3/2020    Procedure performed by: Elaina Wick DO    Provider assisted by: Benedict Gurrola LPN    Patient assisted by: self    How tolerated by patient: tolerated the procedure well with no complications    Post Procedural Pain Scale: 0 - No Hurt    Comments: none    Procedure:  After consent was obtained, using sterile technique the tendon was prepped. Local anesthetic used: 1% lidocaine. Kenalog 5 mg X2 and was then injected and the needle withdrawn. The procedure was well tolerated. The patient is asked to continue to rest the area for a few more days before resuming regular activities. It may be more painful for the first 1-2 days. Watch for fever, or increased swelling or persistent pain in the joint. Call or return to clinic prn if such symptoms occur or there is failure to improve as anticipated.

## 2020-09-04 ENCOUNTER — OFFICE VISIT (OUTPATIENT)
Dept: NEUROLOGY | Age: 55
End: 2020-09-04

## 2020-09-04 VITALS
WEIGHT: 195.2 LBS | TEMPERATURE: 98.1 F | HEART RATE: 85 BPM | BODY MASS INDEX: 33.32 KG/M2 | SYSTOLIC BLOOD PRESSURE: 124 MMHG | HEIGHT: 64 IN | DIASTOLIC BLOOD PRESSURE: 88 MMHG | RESPIRATION RATE: 20 BRPM | OXYGEN SATURATION: 97 %

## 2020-09-04 DIAGNOSIS — R51.9 NEW ONSET HEADACHE: Primary | ICD-10-CM

## 2020-09-04 RX ORDER — FERROUS SULFATE 325(65) MG
325 TABLET, DELAYED RELEASE (ENTERIC COATED) ORAL
Qty: 90 TAB | Refills: 0 | Status: SHIPPED | OUTPATIENT
Start: 2020-09-04 | End: 2020-11-17

## 2020-09-04 RX ORDER — ASCORBIC ACID 250 MG
250 TABLET ORAL DAILY
Qty: 30 TAB | Refills: 0 | Status: SHIPPED | OUTPATIENT
Start: 2020-09-04 | End: 2020-10-04 | Stop reason: SDUPTHER

## 2020-09-04 NOTE — PROGRESS NOTES
Tony Sanchez is a 48 y.o. female . presents for New Patient Raz Collet, NP) and Headache   . A 48years old male patient with medical history of left shoulder pain from rotator cuff injury, hypertension,  and depression here for evaluation of headache since April 2020. Headache is on the right side, throbbing, 10 out of 10 in severity, gets worse with movement, lasting for about 1 day. No associated nausea or vomiting. No photophobia or phonophobia. No aura. No problem walking. No falls. No difficulty with her balance. No changes in her vision or diplopia. Used to have 2 3 headaches per week. No change with over-the-counter pain medications. She claims that she has a new stress. Was not on any prophylactic medications. But headache spontaneously improved. No headaches over the past 2 weeks. She takes naproxen twice a week for her shoulder pain. Denied having any recent head trauma. She currently takes duloxetine 60 mg p.o. per day and Lexapro 20 mg p.o. per day. Review of Systems   Constitutional: Negative for chills, fever and weight loss. HENT: Negative for hearing loss and tinnitus. Eyes: Negative for blurred vision and double vision. Respiratory: Negative for cough and shortness of breath. Cardiovascular: Positive for chest pain. Negative for leg swelling. Gastrointestinal: Negative for heartburn, nausea and vomiting. Genitourinary: Negative for dysuria, frequency and urgency. Musculoskeletal: Positive for joint pain (left shoulder). Negative for back pain and neck pain. Skin: Negative for itching and rash. Neurological: Positive for dizziness (sometimes) and headaches (no headaches for 2 weeks). Negative for tingling, tremors, speech change, focal weakness and seizures. Endo/Heme/Allergies: Does not bruise/bleed easily. Psychiatric/Behavioral: Positive for depression and memory loss. Negative for hallucinations and suicidal ideas. The patient has insomnia. No past medical history on file.     Past Surgical History:   Procedure Laterality Date    HX SHOULDER ARTHROSCOPY Left 2017 and oct 2018        Family History   Problem Relation Age of Onset    Diabetes Mother     Heart Attack Mother     Diabetes Father     Lupus Sister     Depression Other     Depression Paternal Uncle         Social History     Socioeconomic History    Marital status: SINGLE     Spouse name: Not on file    Number of children: Not on file    Years of education: Not on file    Highest education level: Not on file   Occupational History    Not on file   Social Needs    Financial resource strain: Not on file    Food insecurity     Worry: Not on file     Inability: Not on file    Transportation needs     Medical: Not on file     Non-medical: Not on file   Tobacco Use    Smoking status: Never Smoker    Smokeless tobacco: Never Used   Substance and Sexual Activity    Alcohol use: No     Frequency: Never    Drug use: No    Sexual activity: Yes     Partners: Male   Lifestyle    Physical activity     Days per week: Not on file     Minutes per session: Not on file    Stress: Not on file   Relationships    Social connections     Talks on phone: Not on file     Gets together: Not on file     Attends Latter day service: Not on file     Active member of club or organization: Not on file     Attends meetings of clubs or organizations: Not on file     Relationship status: Not on file    Intimate partner violence     Fear of current or ex partner: Not on file     Emotionally abused: Not on file     Physically abused: Not on file     Forced sexual activity: Not on file   Other Topics Concern    Not on file   Social History Narrative    Not on file        Allergies   Allergen Reactions    Prednisone Other (comments)     Coughs up blood           Current Outpatient Medications   Medication Sig Dispense Refill    ascorbic acid, vitamin C, (VITAMIN C) 250 mg tablet Take 1 Tab by mouth daily. 30 Tab 0    ferrous sulfate (IRON) 325 mg (65 mg iron) EC tablet Take 1 Tab by mouth Daily (before breakfast). 90 Tab 0    propranoloL (INDERAL) 40 mg tablet Take 1 tablet by mouth up to three times a day as needed. 90 Tab 5    naproxen EC (EC-Naproxen) 500 mg EC tablet naproxen 500 mg tablet   Take 1 tablet twice a day by oral route as needed for 60 days.  ergocalciferol (ERGOCALCIFEROL) 1,250 mcg (50,000 unit) capsule Take 1 Cap by mouth every seven (7) days. 8 Cap 0    tiZANidine (ZANAFLEX) 4 mg tablet Take 4 mg by mouth two (2) times daily as needed.  gabapentin (NEURONTIN) 300 mg capsule TAKE 1 CAPSULE BY MOUTH AT DINNER TIME AND 1 CAPSULE BY MOUTH AT BEDTIME  0    DULoxetine (CYMBALTA) 60 mg capsule duloxetine 60 mg capsule,delayed release   TAKE 1 CAPSULE(S) EVERY DAY BY ORAL ROUTE FOR 15 DAYS.  celecoxib (CELEBREX) 200 mg capsule Take 200 mg by mouth.  escitalopram oxalate (LEXAPRO) 20 mg tablet Take 1 Tab by mouth daily. 90 Tab 3         Physical Exam  Constitutional:       Appearance: Normal appearance. HENT:      Head: Normocephalic and atraumatic. Mouth/Throat:      Mouth: Mucous membranes are moist.      Pharynx: Oropharynx is clear. No oropharyngeal exudate. Eyes:      Extraocular Movements: Extraocular movements intact. Pupils: Pupils are equal, round, and reactive to light. Neck:      Musculoskeletal: Normal range of motion and neck supple. Pulmonary:      Effort: Pulmonary effort is normal. No respiratory distress. Musculoskeletal:      Right lower leg: No edema. Left lower leg: No edema. Comments: Limited left shoulder movement and has difficulty lifting her left upper extremity up. Neurological:      Mental Status: She is alert. Comments: Mental status: Awake, alert, oriented, follows simple and complex commands.   Speech and languge: fluent, coherent, and comprehension intact  CN: VFF, EOMI, PERRLA, face sensation intact , no facial asymmetry noted, palate elevation symmetric bilat, SS+SCM 5/5 bilat, tongue midline  Motor: no pronator drift, tone normal throughout, strength 5/5 throughout except for left upper extremity: Limited movement of the left shoulder joint. Sensory: intact to light touch and pinprick throughout  Coordination: FNF, HS accurate w/o dysmetria  DTR: 2+ throughout  Gait: Is normal          No visits with results within 3 Month(s) from this visit. Latest known visit with results is:   Hospital Outpatient Visit on 11/26/2019   Component Date Value Ref Range Status    WBC 11/26/2019 5.5  4.6 - 13.2 K/uL Final    RBC 11/26/2019 4.65  4.20 - 5.30 M/uL Final    HGB 11/26/2019 12.4  12.0 - 16.0 g/dL Final    HCT 11/26/2019 35.1  35.0 - 45.0 % Final    MCV 11/26/2019 75.5  74.0 - 97.0 FL Final    MCH 11/26/2019 26.7  24.0 - 34.0 PG Final    MCHC 11/26/2019 35.3  31.0 - 37.0 g/dL Final    RDW 11/26/2019 15.9* 11.6 - 14.5 % Final    PLATELET 48/69/5192 290  135 - 420 K/uL Final    MPV 11/26/2019 10.9  9.2 - 11.8 FL Final    NEUTROPHILS 11/26/2019 54  40 - 73 % Final    LYMPHOCYTES 11/26/2019 36  21 - 52 % Final    MONOCYTES 11/26/2019 8  3 - 10 % Final    EOSINOPHILS 11/26/2019 2  0 - 5 % Final    BASOPHILS 11/26/2019 0  0 - 2 % Final    ABS. NEUTROPHILS 11/26/2019 2.9  1.8 - 8.0 K/UL Final    ABS. LYMPHOCYTES 11/26/2019 2.0  0.9 - 3.6 K/UL Final    ABS. MONOCYTES 11/26/2019 0.5  0.05 - 1.2 K/UL Final    ABS. EOSINOPHILS 11/26/2019 0.1  0.0 - 0.4 K/UL Final    ABS. BASOPHILS 11/26/2019 0.0  0.0 - 0.1 K/UL Final    DF 11/26/2019 AUTOMATED    Final    Iron 11/26/2019 52  50 - 175 ug/dL Final    Patients receiving metal-binding drugs (e.g. deferoxamine) may show spuriously depressed iron values, as chelated iron may not properly react in the iron assay.     TIBC 11/26/2019 412  250 - 450 ug/dL Final    Iron % saturation 11/26/2019 13  % Final    Ferritin 11/26/2019 5* 8 - 388 NG/ML Final    Sodium 11/26/2019 141  136 - 145 mmol/L Final    Potassium 11/26/2019 3.5  3.5 - 5.5 mmol/L Final    Chloride 11/26/2019 109  100 - 111 mmol/L Final    CO2 11/26/2019 29  21 - 32 mmol/L Final    Anion gap 11/26/2019 3  3.0 - 18 mmol/L Final    Glucose 11/26/2019 96  74 - 99 mg/dL Final    BUN 11/26/2019 11  7.0 - 18 MG/DL Final    Creatinine 11/26/2019 0.94  0.6 - 1.3 MG/DL Final    BUN/Creatinine ratio 11/26/2019 12  12 - 20   Final    GFR est AA 11/26/2019 >60  >60 ml/min/1.73m2 Final    GFR est non-AA 11/26/2019 >60  >60 ml/min/1.73m2 Final    Comment: (NOTE)  Estimated GFR is calculated using the Modification of Diet in Renal   Disease (MDRD) Study equation, reported for both  Americans   (GFRAA) and non- Americans (GFRNA), and normalized to 1.73m2   body surface area. The physician must decide which value applies to   the patient. The MDRD study equation should only be used in   individuals age 25 or older. It has not been validated for the   following: pregnant women, patients with serious comorbid conditions,   or on certain medications, or persons with extremes of body size,   muscle mass, or nutritional status.  Calcium 11/26/2019 9.3  8.5 - 10.1 MG/DL Final    Bilirubin, total 11/26/2019 0.6  0.2 - 1.0 MG/DL Final    ALT (SGPT) 11/26/2019 35  13 - 56 U/L Final    AST (SGOT) 11/26/2019 26  10 - 38 U/L Final    Alk. phosphatase 11/26/2019 75  45 - 117 U/L Final    Protein, total 11/26/2019 6.9  6.4 - 8.2 g/dL Final    Albumin 11/26/2019 3.6  3.4 - 5.0 g/dL Final    Globulin 11/26/2019 3.3  2.0 - 4.0 g/dL Final    A-G Ratio 11/26/2019 1.1  0.8 - 1.7   Final    LIPID PROFILE 11/26/2019        Final    Cholesterol, total 11/26/2019 194  <200 MG/DL Final    Triglyceride 11/26/2019 91  <150 MG/DL Final    Comment: The drugs N-acetylcysteine (NAC) and  Metamiszole have been found to cause falsely  low results in this chemical assay.  Please  be sure to submit blood samples obtained  BEFORE administration of either of these  drugs to assure correct results.  HDL Cholesterol 11/26/2019 72* 40 - 60 MG/DL Final    LDL, calculated 11/26/2019 103.8* 0 - 100 MG/DL Final    VLDL, calculated 11/26/2019 18.2  MG/DL Final    CHOL/HDL Ratio 11/26/2019 2.7  0 - 5.0   Final    Vitamin D 25-Hydroxy 11/26/2019 26.4* 30 - 100 ng/mL Final    Comment: (NOTE)  Deficiency               <20 ng/mL  Insufficiency          20-30 ng/mL  Sufficient             ng/mL  Possible toxicity       >100 ng/mL    The Method used is Siemens Advia Centaur currently standardized to a   Center of Disease Control and Prevention (CDC) certified reference   [address]. Samples containing fluorescein dye can produce falsely   elevated values when tested with the ADVIA Centaur Vitamin D Assay. It is recommended that results in the toxic range, >100 ng/mL, be   retested 72 hours post fluorescein exposure.  TSH 11/26/2019 0.70  0.36 - 3.74 uIU/mL Final    T4, Free 11/26/2019 1.1  0.7 - 1.5 NG/DL Final    Hemoglobin A1c 11/26/2019 5.7* 4.2 - 5.6 % Final    Comment: (NOTE)  HbA1C Interpretive Ranges  <5.7              Normal  5.7 - 6.4         Consider Prediabetes  >6.5              Consider Diabetes      Sed rate, automated 11/26/2019 14  0 - 30 mm/hr Final    Vitamin B12 11/26/2019 343  211 - 911 pg/mL Final    Folate 11/26/2019 19.9* 3.10 - 17.50 ng/mL Final             ICD-10-CM ICD-9-CM    1. New onset headache  R51 784.0 CT HEAD WO CONT     A 50-year-old female patient with the above medical problems here for evaluation of headache of 4 months duration. Denied any prior headaches. Initially it was very severe and more frequent. But the headache stopped spontaneously about 2 weeks ago. Patient endorsed stress. No focal neuro deficits. She is on Lexapro and duloxetine for depression. For new onset headache in her age group, will do CT scan of the head.   Since the headache is much better now, we will hold any prophylactic medications. We will see her in 4 months time.

## 2020-09-04 NOTE — PROGRESS NOTES
Kayla Navarrete is a 48 y.o. female new patient in office today to discuss HAs; as referred by Johanny Plascencia.

## 2020-09-25 ENCOUNTER — HOSPITAL ENCOUNTER (OUTPATIENT)
Dept: PHYSICAL THERAPY | Age: 55
Discharge: HOME | End: 2020-09-25
Payer: MEDICAID

## 2020-09-25 PROCEDURE — 97165 OT EVAL LOW COMPLEX 30 MIN: CPT

## 2020-09-25 NOTE — PROGRESS NOTES
[facility]  [address] [facility]  [facility], [address]  Ph [phone]  Fx [phone]    Plan of Care/Statement of Necessity for Occupational Therapy Services    Patient name: Shayna Li Start of Care: 2020   Referral source: Jose Alberto Kc DO : 1965    Medical Diagnosis: Mallet Finger   Onset Date:2020    Treatment Diagnosis: Mallet finger of right finger(s) [M20.011]   Prior Hospitalization: see medical history Provider#: [id number]   Medications: Verified on Patient summary List    Comorbidities: depression, hypertension   Prior Level of Function: previously limited in daily activities due to limited range of motion of left shoulder; pt is a left hand dominant     The Plan of Care and following information is based on the information from the initial evaluation. Assessment/ key information: 47 yo female [facility] to [facility] with c/o inability to extend DIP of index finger, pain in right hand, and difficulty carrying items. Patient presents with fixed finger flexion at DIP. Patient reports burning pain in right hand when carrying items. Patient demonstrates decreased right finger and thumb range of motion and decreased coordination that limits function with daily activities. Patient would benefit from skilled OT intervention to address the above deficits.       Patient will continue to benefit from skilled OT services to modify and progress therapeutic interventions, address ROM deficits, analyze and address soft tissue restrictions, analyze and modify body mechanics/ergonomics, instruct in home and community integration and instruct in splint wear to attain remaining goals.     Evaluation Complexity: History LOW Complexity : Brief history review  Examination LOW Complexity : 1-3 performance deficits relating to physical, cognitive , or psychosocial skils that result in activity limitations and / or participation restrictions  Clinical Decision Making MEDIUM Complexity : Patient may present with comorbidities that affect occupational performnce. Miniml to moderate modification of tasks or assistance (eg, physical or verbal ) with assesment(s) is necessary to enable patient to complete evaluation  Clinical Decision Making MEDIUM Complexity : FOTO score of 26-74 : FOTO score = an established functional score where 100 = no disability  Overall Complexity Rating: LOW     Problem List: Pain effecting function, Decreased range of motion, Decreased coordination/prehension, Decreased ADL/functional abilities  and Decreased flexibility/joint mobility   Treatment Plan may include any combination of the following: Therapeutic exercise, Therapeutic activities, Neuromuscular re-education, Splinting/orthoses, Patient education and ADLs/IADLs  Patient / Family readiness to learn indicated by: asking questions  Persons(s) to be included in education:   patient (P)  Barriers to Learning/Limitations: None  Measures taken if barriers to learning: n/a  Patient Goal (s): to be able to straighten finger  Patient Self Reported Health Status: fair  Rehabilitation Potential: good    Short Term Goals: To be accomplished in 2 weeks:  1. Patient will be independent and compliant with splint wear to progress toward goals and restore functional independence with activities of daily living. Eval Status: issued at Eval     2. Patient will improve  FOTO score by 4 points to increase functional tolerance for exercise. Eval Status: 57 Points     3. Patient will improve pain in right hand to 6/10 at worst to improve activity tolerance and restore prior level of function for activities of daily living. Eval Status: 10/10 at worst  Long Term Goals: To be accomplished in 6 weeks:   1. Patient will improve  FOTO score by 7 points to increase functional tolerance for exercise.   Eval Status: 57 Points     2. Patient will improve pain in right hand to 2/10 at worst to improve activity tolerance and restore prior level of function for activities of daily living. Eval Status: 10/10 at worst     3. Patient will improve right index finger extension by 20 degrees to increase functional independence of ADLs. Eval Status: -25 extension      4. Patient will oppose right thumb to all digits without difficulty to increase ability to carry items. Eval Status: opposes digits 2-4 with difficulty; cannot oppose digit 5    Frequency / Duration: Patient to be seen 2 times per week for 6 weeks:    Patient/ Caregiver education and instruction: Diagnosis, prognosis, brace/ splint application  [x]  Plan of care has been reviewed with Lucy Screen 9/25/2020 11:45 AM    ________________________________________________________________________    I certify that the above Therapy Services are being furnished while the patient is under my care. I agree with the treatment plan and certify that this therapy is necessary.     Physician's Signature:_____________________Date:____________TIME:________    [employer], Date and Time must be completed for valid certification **  Please sign and return to [facility]  [address], [facility] [place]  Ph [phone]  Fx [phone]

## 2020-09-25 NOTE — PROGRESS NOTES
OT DAILY TREATMENT NOTE/HAND EVAL 10-18    Patient Name: Noe Pink  Date:2020  : 1965  [x]  Patient  Verified  Payor: [facility] MEDICAID / Plan: Zeke 46 / Product Type: Managed Care Medicaid /    In time:1020  Out time:1105  Total Treatment Time (min): 45  Visit #: 1 of 12    Treatment Area:  Mallet finger of right finger(s) [M20.011]    SUBJECTIVE  Pain Level (0-10 scale): 5/10; patient reports burning pain along palmar surface of right hand  [x]constant []intermittent []improving []worsening []no change since onset    Any medication changes, allergies to medications, adverse drug reactions, diagnosis change, or new procedure performed?: [x] No    [] Yes (see summary sheet for update)  Subjective functional status/changes:     PLOF: previously limited in daily activities due to limited range of motion of left shoulder; pt is a left hand dominant  Limitations to PLOF: pain in right hand and fingers, decreased range of motion  Mechanism of Injury: 2020 sudden onset of pain and inability to extend DIP of 2nd digit on right hand  Current symptoms/Complaints: 10/10 pain at worst; 5/10 pain at best; pain alleviated with heat; pain exacerbated by carrying heavy items; pain interrupts sleep, lack of extension of right index finger  Previous Treatment/Compliance: splinted at [address] [place] [address], worn for 1.5 months, referred to orthopedics unable to schedule appointment due to pandemic; Received Cortizone shot with temporary relief of pain  PMHx/Surgical Hx: PMHx: hypertension, depression, restless leg syndrome; Surgical Hx: left rotatory cuff surgery  Work Hx: on worker's comp;    Living Situation: lives alone; 2 story townhouse;   Pt Goals: \"to be able to straighten finger\"  Barriers: [x]pain []financial []time []transportation []other  Motivation: good  Cognition: A & O x 3    Other:    OBJECTIVE    45 min [x]Eval                  []Re-Eval             With   [] TE   [] TA   [] neuro   [x] other: Patient Education: [x] Review HEP    [] Progressed/Changed HEP based on:   [] positioning   [] body mechanics   [] transfers   [] heat/ice application    [x] other: splint wear       General Evaluation      Objective:  Sensation:  Light Touch [x]WFL          [] Impaired   Sharp/Dull [x]WFL          [] Impaired   Pain/Temperature [x]WFL          [] Impaired    Range of Motion:     Active     Norms Right Left   Wrist Flex 0-80 60 60    Ext 0-70 60 80    Ulnar Dev 0-30 [facility] WFL    Radial Dev 0-20 [facility] WFL     Strength:    Right Left   Wrist Flex 4+ 4+    Ext 4+ 4+    Ulnar Dev 4+ 4+    Radial Dev 4+ 4+     Hand ROM    Index Middle Ring Small Thumb    MP 85 85 80 80 WFL CMC   PIP 95 95 95 95 WFL MP   DIP 25-80 80 80 80 WFL IP         Singh Abd         Radial Abd     Nine-Hole Peg Test:  Left= 28 seconds  Right= 43 seconds  Finger Opposition: Left: oppose digits 2-5 and base of 5th; Right: oppose digits 2-4 with difficulty    Palpation: tenderness of tip of DIP of 2nd digit    ADLs  Feeding:        []MaxA   []ModA   [x]Treasure   [] CGA   []SBA   []Pauline   []Independent  UE Dressing:       []MaxA   []ModA   [x]Treasure   [] CGA   []SBA   []Pauline   []Independent  LE Dressing:       []MaxA   []ModA   [x]Treasure   [] CGA   []SBA   []Pauline   []Independent  Grooming:       []MaxA   []ModA   [x]Treasure   [] CGA   []SBA   []Pauline   []Independent  Toileting:       []MaxA   []ModA   []Treasure   [] CGA   []SBA   []Pauline   [x]Independent  Bathing:       []MaxA   []ModA   []Treasure   [] CGA   []SBA   []Pauline   [x]Independent  Light Meal Prep:    []MaxA   []ModA   []Treasure   [] CGA   []SBA   []Pauline   [x]Independent  Household/Other: []MaxA   []ModA   [x]Treasure   [] CGA   []SBA   []Pauline   []Independent  Adaptive Equip:     []MaxA   []ModA   []Treasure   [] CGA   []SBA   []Pauline   [x]Independent  Driving:       []MaxA   []ModA   []Treasure   [] CGA   []SBA   []Pauline   [x]Independent  Money Mgmt:        []MaxA   []ModA   []Treasure   [] CGA   []SBA   []Pauline [x]Independent     Pain Level (0-10 scale) post treatment: 3    ASSESSMENT/Changes in Function: 47 yo female presents to [facility] with c/o inability to extend DIP of index finger, pain in right hand, and difficulty carrying items. Patient presents with fixed finger flexion at DIP. Patient reports burning pain in right hand when carrying items. Patient demonstrates decreased right finger and thumb range of motion and decreased coordination that limits function with daily activities. Patient would benefit from skilled OT intervention to address the above deficits. Patient will continue to benefit from skilled OT services to modify and progress therapeutic interventions, address ROM deficits, analyze and address soft tissue restrictions, analyze and modify body mechanics/ergonomics, instruct in home and community integration and instruct in splint wear to attain remaining goals. [x]  See Plan of Care  []  See progress note/recertification  []  See Discharge Summary          Progress towards goals / Updated goals:  Short Term Goals: To be accomplished in 2 weeks:  1. Patient will be independent and compliant with splint wear to progress toward goals and restore functional independence with activities of daily living. Eval Status: issued at [facility]     2. Patient will improve  FOTO score by 4 points to increase functional tolerance for exercise. Eval Status: 57 Points    3. Patient will improve pain in right hand to 6/10 at worst to improve activity tolerance and restore prior level of function for activities of daily living. Eval Status: 10/10 at worst        Long Term Goals: to be accomplished in 6 weeks:  1. Patient will improve  FOTO score by 7 points to increase functional tolerance for exercise. Eval Status: 57 Points    2. Patient will improve pain in right hand to 2/10 at worst to improve activity tolerance and restore prior level of function for activities of daily living.    Eval Status: 10/10 at worst     3. Patient will improve right index finger extension by 20 degrees to increase functional independence of ADLs. Eval Status: -25 extension      4. Patient will oppose right thumb to all digits without difficulty to increase ability to carry items.   Eval Status: opposes digits 2-4 with difficulty; cannot oppose digit 5      PLAN  []  Upgrade activities as tolerated     [x]  Continue plan of care  []  Update interventions per flow sheet       []  Discharge due to:_  []  Other:_      Kaia Watt 9/25/2020  10:25 AM

## 2020-10-02 ENCOUNTER — HOSPITAL ENCOUNTER (OUTPATIENT)
Dept: PHYSICAL THERAPY | Age: 55
Discharge: HOME | End: 2020-10-02
Payer: MEDICAID

## 2020-10-02 PROCEDURE — 97760 ORTHOTIC MGMT&TRAING 1ST ENC: CPT

## 2020-10-02 NOTE — PROGRESS NOTES
OT DAILY TREATMENT NOTE - [facility]     Patient Name: Cedric Guajardo  Date:10/2/2020  : 1965  [x]  Patient  Verified  Payor: [facility] MEDICAID / Plan: Aprilkganandjanet 46 / Product Type: Managed Care Medicaid /    In time:1020  Out time:1100  Total Treatment Time (min): 40  Total Timed Codes (min): 40  Visit #: 2 of 12    Treatment Area: Mallet finger of right finger(s) [M20.011]    SUBJECTIVE  Pain Level (0-10 scale): 0  Any medication changes, allergies to medications, adverse drug reactions, diagnosis change, or new procedure performed?: [x] No    [] Yes (see summary sheet for update)  Subjective functional status/changes:   [x] No changes reported      OBJECTIVE      40 min Orthotic/Splinting: custom mallet finger splint to place DIP in hyperextension   Rationale: immobilize DIP  to improve the patients ability to regain extension in DIP    With   [] TE   [] TA   [] neuro   [x] other: Patient Education: [x] Review HEP    [] Progressed/Changed HEP based on:   [x] positioning   [] body mechanics   [] transfers   [] heat/ice application   [x] Splint wear/care   [] Sensory re-education   [] scar management      [] other:              Pain Level (0-10 scale) post treatment: 7/10 aching with positioning of DIP in extension    ASSESSMENT/Changes in Function: Patient brought prefabricated splint to clinic. After examining positioning of DIP in splint, a custom orthotic was created. Patient reports mild aching with positioning of DIP in hyperextension to allow healing of extensor tendon per Dr. Becky Lou order. Patient educated on wear and care of splint. Patient follows up with Dr. Idalia Silvestre in 3 weeks.     Patient being placed on hold for therapy until follow-up with Dr. Idalia Silvestre to allow time to heal.     [x]  See Plan of Care  []  See progress note/recertification  []  See Discharge Summary         Progress towards goals / Updated goals:  1. Patient will be independent and compliant with splint wear to progress toward goals and restore functional independence with activities of daily living. Eval Status: issued at Eval  Status: custom splint created, splint wear and care HEP provided     2. Patient will improve  FOTO score by 4 points to increase functional tolerance for exercise. Eval Status: 57 Points     3. Patient will improve pain in right hand to 6/10 at worst to improve activity tolerance and restore prior level of function for activities of daily living. Eval Status: 10/10 at worst  Eval Status: 7/10      PLAN  []  Upgrade activities as tolerated     []  Continue plan of care  []  Update interventions per flow sheet       []  Discharge due to:_  [x]  Other:  On hold until follow up with Dr. Cole Jackson OTR/L 10/2/2020  10:53 AM    Future Appointments   Date Time Provider Casey Morales   10/13/2020 11:45 AM Edward Chong, OTR/L [facility]   10/16/2020 11:00 AM Edward Chong, OTR/L [facility]   10/20/2020 11:45 AM Edward Chong, OTR/L [facility]   10/23/2020  9:30 AM Edward Chong, OTR/L [facility]   10/27/2020 10:15 AM Edward Chong, OTR/L [facility]   10/29/2020 10:15 AM Sukhdev CARRASCO DO [facility] BS AMB   10/30/2020 11:00 AM Edward Chong, OTR/L [facility]   1/15/2021 11:20 AM Gil Newman DO [facility] BS AMB

## 2020-10-04 DIAGNOSIS — D50.8 OTHER IRON DEFICIENCY ANEMIA: ICD-10-CM

## 2020-10-04 RX ORDER — ASCORBIC ACID 250 MG
TABLET ORAL
Qty: 30 TAB | Refills: 0 | Status: SHIPPED | OUTPATIENT
Start: 2020-10-04 | End: 2020-11-15 | Stop reason: SDUPTHER

## 2020-10-13 ENCOUNTER — APPOINTMENT (OUTPATIENT)
Dept: PHYSICAL THERAPY | Age: 55
End: 2020-10-13
Payer: MEDICAID

## 2020-10-16 ENCOUNTER — APPOINTMENT (OUTPATIENT)
Dept: PHYSICAL THERAPY | Age: 55
End: 2020-10-16
Payer: MEDICAID

## 2020-10-20 ENCOUNTER — APPOINTMENT (OUTPATIENT)
Dept: PHYSICAL THERAPY | Age: 55
End: 2020-10-20
Payer: MEDICAID

## 2020-10-21 ENCOUNTER — TELEPHONE (OUTPATIENT)
Dept: FAMILY MEDICINE CLINIC | Age: 55
End: 2020-10-21

## 2020-10-23 ENCOUNTER — APPOINTMENT (OUTPATIENT)
Dept: PHYSICAL THERAPY | Age: 55
End: 2020-10-23
Payer: MEDICAID

## 2020-10-27 ENCOUNTER — APPOINTMENT (OUTPATIENT)
Dept: PHYSICAL THERAPY | Age: 55
End: 2020-10-27
Payer: MEDICAID

## 2020-10-29 ENCOUNTER — OFFICE VISIT (OUTPATIENT)
Dept: ORTHOPEDIC SURGERY | Age: 55
End: 2020-10-29
Payer: COMMERCIAL

## 2020-10-29 VITALS
OXYGEN SATURATION: 98 % | HEART RATE: 98 BPM | RESPIRATION RATE: 17 BRPM | SYSTOLIC BLOOD PRESSURE: 145 MMHG | WEIGHT: 200 LBS | TEMPERATURE: 97.1 F | DIASTOLIC BLOOD PRESSURE: 93 MMHG | HEIGHT: 64 IN | BODY MASS INDEX: 34.15 KG/M2

## 2020-10-29 DIAGNOSIS — M20.011 MALLET DEFORMITY OF RIGHT INDEX FINGER: Primary | ICD-10-CM

## 2020-10-29 PROCEDURE — 99213 OFFICE O/P EST LOW 20 MIN: CPT | Performed by: ORTHOPAEDIC SURGERY

## 2020-10-29 NOTE — PROGRESS NOTES
Tahir Reid is a 48 y.o. female left handed unspecified employment. Worker's Compensation and legal considerations: none filed. Vitals:    10/29/20 1023   BP: (!) 145/93   Pulse: 98   Resp: 17   Temp: 97.1 °F (36.2 °C)   SpO2: 98%   Weight: 200 lb (90.7 kg)   Height: 5' 4\" (1.626 m)   PainSc:   2           Chief Complaint   Patient presents with    Hand Pain     right       HPI: Patient comes in today for follow-up of her right index mallet finger. At her last visit she received right middle and ring finger A1 pulley injections and she has since gotten a splint for her mallet finger that is awkwardly loose fitting and not keeping her finger straight. Initial HPI: Patient comes in today with complaints of right index finger deformity as well as right middle finger and ring finger pain and locking. Date of onset: March 2020    Injury: No    Prior Treatment:  Yes: Comment: right middle and ring finger A1 pulley injections, mallet splint    Numbness/ Tingling: No      ROS: Review of Systems - General ROS: negative  Psychological ROS: negative  ENT ROS: negative  Allergy and Immunology ROS: negative  Hematological and Lymphatic ROS: negative  Respiratory ROS: no cough, shortness of breath, or wheezing  Cardiovascular ROS: no chest pain or dyspnea on exertion  Gastrointestinal ROS: no abdominal pain, change in bowel habits, or black or bloody stools  Musculoskeletal ROS: positive for - pain in finger - right  Neurological ROS: negative  Dermatological ROS: negative    History reviewed. No pertinent past medical history. Past Surgical History:   Procedure Laterality Date    HX SHOULDER ARTHROSCOPY Left 2017 and oct 2018       Current Outpatient Medications   Medication Sig Dispense Refill    ascorbic acid, vitamin C, (VITAMIN C) 250 mg tablet TAKE 1 TABLET BY MOUTH EVERY DAY 30 Tab 0    ferrous sulfate (IRON) 325 mg (65 mg iron) EC tablet Take 1 Tab by mouth Daily (before breakfast).  90 Tab 0    propranoloL (INDERAL) 40 mg tablet Take 1 tablet by mouth up to three times a day as needed. 90 Tab 5    naproxen EC (EC-Naproxen) 500 mg EC tablet naproxen 500 mg tablet   Take 1 tablet twice a day by oral route as needed for 60 days.  ergocalciferol (ERGOCALCIFEROL) 1,250 mcg (50,000 unit) capsule Take 1 Cap by mouth every seven (7) days. 8 Cap 0    tiZANidine (ZANAFLEX) 4 mg tablet Take 4 mg by mouth two (2) times daily as needed.  gabapentin (NEURONTIN) 300 mg capsule TAKE 1 CAPSULE BY MOUTH AT DINNER TIME AND 1 CAPSULE BY MOUTH AT BEDTIME  0    DULoxetine (CYMBALTA) 60 mg capsule duloxetine 60 mg capsule,delayed release   TAKE 1 CAPSULE(S) EVERY DAY BY ORAL ROUTE FOR 15 DAYS.  escitalopram oxalate (LEXAPRO) 20 mg tablet Take 1 Tab by mouth daily. 90 Tab 3       Allergies   Allergen Reactions    Prednisone Other (comments)     Coughs up blood             PE:     Physical Exam  Vitals signs and nursing note reviewed. Constitutional:       General: She is not in acute distress. Appearance: Normal appearance. She is not ill-appearing. Eyes:      Pupils: Pupils are equal, round, and reactive to light. Neck:      Musculoskeletal: Normal range of motion. Cardiovascular:      Pulses: Normal pulses. Pulmonary:      Effort: Pulmonary effort is normal. No respiratory distress. Abdominal:      General: Abdomen is flat. Musculoskeletal: Normal range of motion. General: Tenderness present. No swelling, deformity or signs of injury. Right lower leg: No edema. Left lower leg: No edema. Skin:     General: Skin is warm and dry. Capillary Refill: Capillary refill takes less than 2 seconds. Findings: No bruising or erythema. Neurological:      General: No focal deficit present. Mental Status: She is alert and oriented to person, place, and time.    Psychiatric:         Mood and Affect: Mood normal.         Behavior: Behavior normal.            Hand: Continued deformity of right index finger DIP joint of approximately 40 degrees of fixed flexion that is passively correctable. On exam the mallet splint is too loose and does not appear to keep the finger straight. Examination L Digit(s) R Digit(s)   1st CMC Tenderness -  -    1st CMC Grind -  -    [place] Nodes -  -    [place] Nodes -  -    A1 Pulley Tenderness -  - LF, RF   Triggering -  - LF,RF   UCL Instability -  -    RCL Instability -  -    Lateral Stress Pain -  -    Palmar Cords -  -    Tabletop test -  -    Garrod's Pads -  -     Strength       Pinch Strength         ROM: Full        Imagin/3/2020 plain films of right index finger does not show any acute fracture or dislocation. There is a fixed flexion deformity of the DIP joint. ICD-10-CM ICD-9-CM    1. Mallet deformity of right index finger  M20.011 736.1 REFERRAL TO OCCUPATIONAL THERAPY         Plan: We will reorder an OT referral for revision mallet splint. Follow-up and Dispositions    · Return in about 4 weeks (around 2020) for Reevaluation and splint check.           Plan was reviewed with patient, who verbalized agreement and understanding of the plan

## 2020-10-30 ENCOUNTER — APPOINTMENT (OUTPATIENT)
Dept: PHYSICAL THERAPY | Age: 55
End: 2020-10-30
Payer: MEDICAID

## 2020-11-04 ENCOUNTER — VIRTUAL VISIT (OUTPATIENT)
Dept: FAMILY MEDICINE CLINIC | Age: 55
End: 2020-11-04
Payer: COMMERCIAL

## 2020-11-04 DIAGNOSIS — Z12.31 ENCOUNTER FOR SCREENING MAMMOGRAM FOR MALIGNANT NEOPLASM OF BREAST: ICD-10-CM

## 2020-11-04 DIAGNOSIS — Z71.89 COUNSELING ON HEALTH PROMOTION AND DISEASE PREVENTION: ICD-10-CM

## 2020-11-04 DIAGNOSIS — M25.512 CHRONIC LEFT SHOULDER PAIN: ICD-10-CM

## 2020-11-04 DIAGNOSIS — D50.8 OTHER IRON DEFICIENCY ANEMIA: ICD-10-CM

## 2020-11-04 DIAGNOSIS — I10 HYPERTENSION, UNSPECIFIED TYPE: Primary | ICD-10-CM

## 2020-11-04 DIAGNOSIS — G89.29 CHRONIC LEFT SHOULDER PAIN: ICD-10-CM

## 2020-11-04 DIAGNOSIS — Z28.21 INFLUENZA VACCINATION DECLINED: ICD-10-CM

## 2020-11-04 PROCEDURE — 99214 OFFICE O/P EST MOD 30 MIN: CPT | Performed by: NURSE PRACTITIONER

## 2020-11-04 NOTE — PROGRESS NOTES
Nadja Velazquez presents today for   Chief Complaint   Patient presents with   Aren.Jaida Elevated Blood Pressure       Nadja Velazquez preferred language for health care discussion is english/other. Is someone accompanying this pt? no    Is the patient using any DME equipment during [address]? no    Depression Screening:  3 most recent PHQ Screens 8/21/2020   PHQ Not Done -   Little interest or pleasure in doing things Not at all   Feeling down, depressed, irritable, or hopeless Not at all   Total Score PHQ 2 0   Trouble falling or staying asleep, or sleeping too much -   Feeling tired or having little energy -   Poor appetite, weight loss, or overeating -   Feeling bad about yourself - or that you are a failure or have let yourself or your family down -   Trouble concentrating on things such as school, work, reading, or watching TV -   Moving or speaking so slowly that other people could have noticed; or the opposite being so fidgety that others notice -   Thoughts of being better off dead, or hurting yourself in some way -   PHQ 9 Score -   How difficult have these problems made it for you to do your work, take care of your home and get along with others -       Learning Assessment:  Learning Assessment 7/28/2020   PRIMARY LEARNER Patient   HIGHEST LEVEL OF EDUCATION - PRIMARY LEARNER  GRADUATED HIGH SCHOOL OR GED   BARRIERS PRIMARY LEARNER NONE   CO-LEARNER CAREGIVER No   PRIMARY LANGUAGE ENGLISH    NEED No   LEARNER PREFERENCE PRIMARY DEMONSTRATION   ANSWERED BY patient   RELATIONSHIP SELF       Abuse Screening:  Abuse Screening Questionnaire 8/21/2020   Do you ever feel afraid of your partner? N   Are you in a relationship with someone who physically or mentally threatens you? N   Is it safe for you to go home? Y       Generalized Anxiety  No flowsheet data found.       Health Maintenance Due   Topic Date Due    DTaP/Tdap/Td series (1 - Tdap) 02/01/1987    Shingrix Vaccine Age 50> (1 of 2) 02/01/2016    Breast Cancer Screen Mammogram  02/01/2016    Flu Vaccine (1) 09/01/2020    PAP AKA CERVICAL CYTOLOGY  09/12/2020   . Health Maintenance reviewed and discussed and ordered per Provider. Coordination of Care:  1. Have you been to the ER, urgent care clinic since your last visit? Hospitalized since your last visit? no    2. Have you seen or consulted any other health care providers outside of the [address] [place] [address] since your last visit? Include any pap smears or colon screening.  no      Advance Directive:  Discussed 5/20/20

## 2020-11-04 NOTE — PROGRESS NOTES
Kenton Cuevas is a 48 y.o. female who was seen by synchronous (real-time) audio-video technology on 11/4/2020 for Elevated Blood Pressure  Reports she went for her iron infusion and her blood pressure was high for 5 days as they gave her treatments for 5 days and her blood pressure was high every day. She has had blood pressure issues in the past but she has a lot of shoulder pain and that is what she thinks her blood pressure elevation is from. She has been on Norvasc but her cardiologist took her off and put her on the metoprolol and then switched her to propranolol. She reports her blood pressure remains elevated even on the propranolol and was elevated the last time she saw the cardiologist one month ago. She reports she only takes the propranolol twice a day. She has not had her flu shot and she is not interested in having one. She states she has never been for a mammogram screening because of her left shoulder pain. She is aware that she is due for Pap smear. Assessment & Plan:   Diagnoses and all orders for this visit:    1. Hypertension, unspecified type  -     METABOLIC PANEL, COMPREHENSIVE; Future  -     LIPID PANEL; Future  -     CBC WITH AUTOMATED DIFF; Future  -     TSH 3RD GENERATION; Future  -     T4, FREE; Future    2. Counseling on health promotion and disease prevention    3. Influenza vaccination declined    4. Encounter for screening mammogram for malignant neoplasm of breast  -     RAQUEL MAMMO BI SCREENING INCL CAD; Future    5. Other iron deficiency anemia  -     METABOLIC PANEL, COMPREHENSIVE; Future  -     CBC WITH AUTOMATED DIFF; Future    6. Chronic left shoulder pain  -     METABOLIC PANEL, COMPREHENSIVE; Future      I have reviewed with patient her health maintenance and her care gaps. She is in agreement with me ordering a mammogram.  Have asked her to schedule a Pap smear. She is to follow-up with her pharmacy regarding her shingles vaccine and her Tdap.   She is aware of her risk of influenza. I have educated patient on her propranolol and I have recommended that she takes this 3 times a day as opposed to twice a day as the order from her cardiologist indicates that she can take this 3 times a day as needed. She is to follow-up with cardiology. She is to go for her fasting labs. Subjective:       Prior to Admission medications    Medication Sig Start Date End Date Taking? Authorizing Provider   ascorbic acid, vitamin C, (VITAMIN C) 250 mg tablet TAKE 1 TABLET BY MOUTH EVERY DAY 10/4/20  Yes Lily ESCUDERO NP   ferrous sulfate (IRON) 325 mg (65 mg iron) EC tablet Take 1 Tab by mouth Daily (before breakfast). 9/4/20  Yes Lily ESCUDERO NP   propranoloL (INDERAL) 40 mg tablet Take 1 tablet by mouth up to three times a day as needed. 8/21/20  Yes Delilah Aguilar DO   naproxen EC (EC-Naproxen) 500 mg EC tablet naproxen 500 mg tablet   Take 1 tablet twice a day by oral route as needed for 60 days. Yes Provider, Historical   ergocalciferol (ERGOCALCIFEROL) 1,250 mcg (50,000 unit) capsule Take 1 Cap by mouth every seven (7) days. 5/20/20  Yes Michael Hannah NP   tiZANidine (ZANAFLEX) 4 mg tablet Take 4 mg by mouth two (2) times daily as needed. Yes Provider, Historical   gabapentin (NEURONTIN) 300 mg capsule TAKE 1 CAPSULE BY MOUTH AT DINNER TIME AND 1 CAPSULE BY MOUTH AT BEDTIME 9/11/19  Yes Provider, Historical   DULoxetine (CYMBALTA) 60 mg capsule duloxetine 60 mg capsule,delayed release   TAKE 1 CAPSULE(S) EVERY DAY BY ORAL ROUTE FOR 15 DAYS. Yes Provider, Historical   escitalopram oxalate (LEXAPRO) 20 mg tablet Take 1 Tab by mouth daily.  1/22/19  Yes Deepak Galindo MD     Patient Active Problem List   Diagnosis Code    Moderate major depression ([facility] [address] F32.1    Depression F32.9    HTN (hypertension) I10    Mixed hyperlipidemia E78.2    Uterine leiomyoma D25.9    Vitamin D deficiency E55.9    GRETCHEN (iron deficiency anemia) D50.9    Other chronic pain G89.29    Chronic left shoulder pain M25.512, G89.29    Other chest pain R07.89    RLS (restless legs syndrome) G25.81    Obesity (BMI 30-39. 9) E66.9    Other viral warts B07.8    Prediabetes R73.03     Patient Active Problem List    Diagnosis Date Noted    Prediabetes 05/20/2020    Other viral warts 02/27/2020    Obesity (BMI 30-39.9) 11/26/2019    RLS (restless legs syndrome) 09/12/2019    Other chest pain 08/14/2019    Other chronic pain 04/25/2019    Chronic left shoulder pain 04/25/2019    Moderate major depression ([address] 01/03/2019    Mixed hyperlipidemia 03/20/2018    Vitamin D deficiency 03/20/2018    Uterine leiomyoma 09/12/2017    Depression 06/15/2017    HTN (hypertension) 06/15/2017    GRETCHEN (iron deficiency anemia) 06/15/2017     Current Outpatient Medications   Medication Sig Dispense Refill    ascorbic acid, vitamin C, (VITAMIN C) 250 mg tablet TAKE 1 TABLET BY MOUTH EVERY DAY 30 Tab 0    ferrous sulfate (IRON) 325 mg (65 mg iron) EC tablet Take 1 Tab by mouth Daily (before breakfast). 90 Tab 0    propranoloL (INDERAL) 40 mg tablet Take 1 tablet by mouth up to three times a day as needed. 90 Tab 5    naproxen EC (EC-Naproxen) 500 mg EC tablet naproxen 500 mg tablet   Take 1 tablet twice a day by oral route as needed for 60 days.  ergocalciferol (ERGOCALCIFEROL) 1,250 mcg (50,000 unit) capsule Take 1 Cap by mouth every seven (7) days. 8 Cap 0    tiZANidine (ZANAFLEX) 4 mg tablet Take 4 mg by mouth two (2) times daily as needed.  gabapentin (NEURONTIN) 300 mg capsule TAKE 1 CAPSULE BY MOUTH AT DINNER TIME AND 1 CAPSULE BY MOUTH AT BEDTIME  0    DULoxetine (CYMBALTA) 60 mg capsule duloxetine 60 mg capsule,delayed release   TAKE 1 CAPSULE(S) EVERY DAY BY ORAL ROUTE FOR 15 DAYS.  escitalopram oxalate (LEXAPRO) 20 mg tablet Take 1 Tab by mouth daily. 90 Tab 3     Allergies   Allergen Reactions    Prednisone Other (comments)     Coughs up blood       History reviewed. No pertinent past medical history. Past Surgical History:   Procedure Laterality Date    HX SHOULDER ARTHROSCOPY Left 2017 and oct 2018     Family History   Problem Relation Age of Onset    Diabetes Mother     Heart Attack Mother     Diabetes Father     Lupus Sister     Depression Other     Depression Paternal Uncle      Social History     Tobacco Use    Smoking status: Never Smoker    Smokeless tobacco: Never Used   Substance Use Topics    Alcohol use: No     Frequency: Never       Review of Systems   Respiratory: Negative for shortness of breath. Cardiovascular: Negative for chest pain and leg swelling. Musculoskeletal: Positive for joint pain (left shoulder chronic). Neurological: Negative for dizziness. Objective:   No flowsheet data found.      [INSTRUCTIONS:  \"[x]\" Indicates a positive item  \"[]\" Indicates a negative item  -- DELETE ALL ITEMS NOT EXAMINED]    Constitutional: [x] Appears well-developed and well-nourished [x] No apparent distress      [] Abnormal -     Mental status: [x] Alert and awake  [x] Oriented to person/place/time [x] Able to follow commands    [] Abnormal -     Eyes:   EOM    [x]  Normal    [] Abnormal -   Sclera  [x]  Normal    [] Abnormal -          Discharge [x]  None visible   [] Abnormal -     HENT: [x] Normocephalic, atraumatic  [] Abnormal -   [x] Mouth/Throat: Mucous membranes are moist    External Ears [x] Normal  [] Abnormal -    Neck: [x] No visualized mass [] Abnormal -     Pulmonary/Chest: [x] Respiratory effort normal   [x] No visualized signs of difficulty breathing or respiratory distress        [] Abnormal -      Musculoskeletal:   [x] Normal gait with no signs of ataxia         [x] Normal range of motion of neck        [] Abnormal -     Neurological:        [x] No Facial Asymmetry (Cranial nerve 7 motor function) (limited exam due to video visit)          [x] No gaze palsy        [] Abnormal -          Skin:        [x] No significant exanthematous lesions or discoloration noted on facial skin         [] Abnormal -            Psychiatric:       [x] Normal Affect [] Abnormal -        [x] No Hallucinations    Other pertinent observable physical exam findings:-        We discussed the expected course, resolution and complications of the diagnosis(es) in detail. Medication risks, benefits, costs, interactions, and alternatives were discussed as indicated. I advised her to contact the office if her condition worsens, changes or fails to improve as anticipated. She expressed understanding with the diagnosis(es) and plan. Aneta Mckeon, who was evaluated through a patient-initiated, synchronous (real-time) audio-video encounter, and/or her healthcare decision maker, is aware that it is a billable service, with coverage as determined by her insurance carrier. She provided verbal consent to proceed: Yes, and patient identification was verified. It was conducted pursuant to the emergency declaration under the [address] [place] [address], [address] [place] [address] authority and the [employer]. A caregiver was present when appropriate. Ability to conduct physical exam was limited. I was at home. The patient was at home.       Sadia Harrison NP

## 2020-11-09 DIAGNOSIS — D50.8 OTHER IRON DEFICIENCY ANEMIA: ICD-10-CM

## 2020-11-09 RX ORDER — ASCORBIC ACID 250 MG
TABLET ORAL
Qty: 30 TAB | Refills: 0 | OUTPATIENT
Start: 2020-11-09

## 2020-11-13 NOTE — TELEPHONE ENCOUNTER
Patient states she has been getting this medication previously by prescription and would like for you to submit it [facility]

## 2020-11-15 RX ORDER — ASCORBIC ACID 250 MG
TABLET ORAL
Qty: 30 TAB | Refills: 0 | Status: SHIPPED | OUTPATIENT
Start: 2020-11-15 | End: 2021-02-02 | Stop reason: ALTCHOICE

## 2020-11-17 NOTE — PROGRESS NOTES
[facility]  [address] [place], [address]  Ph [phone]  Fx [phone]    Occupational Therapy Discharge Summary      Patient name: Sahil Haji Start of Care: 2020   Referral source: Dewey Shelley DO : 1965   Medical/Treatment Diagnosis: Mallet finger of right finger(s) [M20.011] Onset Date:2020     Prior Hospitalization: see medical history Provider#: [id number]   Medications: Verified on Patient Summary List    Comorbidities: depression, hypertension  Prior Level of Function:previously limited in daily activities due to limited range of motion of left shoulder; pt is a left hand dominant    Visits from Start of Care: 2    Missed Visits: 0    Reporting Period : 2020 to 10/02/2020      Summary of Care:  Goal: 1. Patient will be independent and compliant with splint wear to progress toward goals and restore functional independence with activities of daily living. Status at last note/certification: Status: custom splint created, splint wear and care HEP provided  Status at discharge: met    Goal: Patient will improve  FOTO score by 4 points to increase functional tolerance for exercise. Status at last note/certification: 12 HNRPWQ  Status at discharge: not met    Goal: Patient will improve pain in right hand to 6/10 at worst to improve activity tolerance and restore prior level of function for activities of daily living. Status at last note/certification: 3/55 at worst  Status at discharge: not met      ASSESSMENT/RECOMMENDATIONS:  [x]Discontinue therapy: []Patient has reached or is progressing toward set goals      []Patient is non-compliant or has abdicated      [x]Due to lack of appreciable progress towards set goals:          Patient provided with splint and to follow-up with physician.      KATRIN Donovan 2020 10:48 AM

## 2020-11-19 ENCOUNTER — TELEPHONE (OUTPATIENT)
Dept: ORTHOPEDIC SURGERY | Age: 55
End: 2020-11-19

## 2020-11-23 NOTE — TELEPHONE ENCOUNTER
Guerita Brower from [facility] called to verify, are they to put this new splint on or does the patient need to be seen here and have us apply a new splint prior to patient being scheduled for occupational therapy. Guerita Brower [phone] or leave message at front message if Guerita Brower is unavailable.

## 2020-11-24 ENCOUNTER — HOSPITAL ENCOUNTER (OUTPATIENT)
Dept: PHYSICAL THERAPY | Age: 55
Discharge: HOME | End: 2020-11-24
Payer: MEDICAID

## 2020-11-24 PROCEDURE — 97165 OT EVAL LOW COMPLEX 30 MIN: CPT

## 2020-11-24 PROCEDURE — L3913 HFO W/O JOINTS CF: HCPCS

## 2020-11-24 PROCEDURE — 97760 ORTHOTIC MGMT&TRAING 1ST ENC: CPT

## 2020-11-24 NOTE — PROGRESS NOTES
[facility] [place] [address] [address]  [place], [address]  [phone] fax    Orthotic Assessment/Occupational Therapy Discharge Summary    Patient name: Willie Silver Start of Care: 20   Referral source: Roxann Arteaga DO : 1965   Medical/Treatment Diagnosis: Right hand pain [M79.641] Onset Date:2020     Prior Hospitalization: see medical history Provider#: [id number]   Medications: Verified on Patient Summary List    Comorbidities: shoulder injury  Prior Level of Function:Independent without limitations in ADL and IADL activities. *    Summary of Care:    Hand Therapy Orthotic Assessment   Precautions: Total Assessment Time:    In time:1015  Out time:1035  Total Treatment Time (min): 20  Total Timed Codes (min): 20  1:1 Treatment Time (MC only): 20  Visit #: 1 of 1    History of Present Condition:  Patient is a 48 y.o. female being referred to therapy for fabrication of custom extension splint for the right IF DIP jt. .    Patient Goals:  Attain well fitted custom DIP jt extension splint for the right IF    Mechanism of Injury: hit tip of digit on floor    Orthotic Goals: Extension of the DIP jt of the right IF    Orthotic Plan: well fitting DIP jt extension splint    Type of Orthotic(s) issued: custom FO    L Code:        ASSESSMENT/RECOMMENDATIONS: Patients splint appears to be fitting well and patient is able to vebalize understanding of wear, care and precautions. [x]Discontinue therapy: [x]Patient has reached or is progressing toward set goals      []Patient is non-compliant or has abdicated      []Due to lack of appreciable progress towards set goals        Therapist Goals: To be achieved 2020. Patient/Caregiver instructed in orthotic wear, care, and precautions GOAL MET ON ABOVE DATE  Patient will demonstrate a good understanding of their condition and strategies for self-management.   Written orthotic instructions given GOAL MET ON ABOVE DATE  Patient demonstrated independent donning and doffing of orthotic(s) GOAL MET ON ABOVE DATE      Plan of Care:  Patient will contact the [facility] at the number provided if any complications arise for orthotic revision. Thank you for the referral. Please do not hesitate to contact me with any questions or concerns. Enedina Nguyen OT 11/24/2020 10:45 AM    ________________________________________________________________________    I certify that the above Therapy Services are being furnished while the patient is under my care. I agree with the treatment plan and certify that this therapy is necessary.     Physician's Signature:____________________  Date:____________Time:__________    Please sign and return to [facility] [address] [place] [address] Thu Madi [address] [place], [place] [address]s, [facility] [address]  [phone] fax

## 2020-11-30 ENCOUNTER — TELEPHONE (OUTPATIENT)
Dept: ORTHOPEDIC SURGERY | Age: 55
End: 2020-11-30

## 2020-11-30 NOTE — TELEPHONE ENCOUNTER
Patient called stating [facility] informed her she needs a new referral in order to get a new splint for her finger. Patient is requesting a call back in regards to this:[phone].

## 2020-12-01 DIAGNOSIS — M20.011 MALLET DEFORMITY OF RIGHT INDEX FINGER: Primary | ICD-10-CM

## 2020-12-02 ENCOUNTER — VIRTUAL VISIT (OUTPATIENT)
Dept: FAMILY MEDICINE CLINIC | Age: 55
End: 2020-12-02
Payer: MEDICAID

## 2020-12-02 DIAGNOSIS — I10 HYPERTENSION, UNSPECIFIED TYPE: Primary | ICD-10-CM

## 2020-12-02 DIAGNOSIS — Z71.89 COUNSELING ON HEALTH PROMOTION AND DISEASE PREVENTION: ICD-10-CM

## 2020-12-02 PROCEDURE — 99213 OFFICE O/P EST LOW 20 MIN: CPT | Performed by: NURSE PRACTITIONER

## 2020-12-02 NOTE — PROGRESS NOTES
Willie Silver presents today for   Chief Complaint   Patient presents with    Follow-up     4 week    Hypertension       Willie Silver preferred language for health care discussion is english/other. Is someone accompanying this pt? no    Is the patient using any DME equipment during [address]? no    Depression Screening:  3 most recent PHQ Screens 8/21/2020   PHQ Not Done -   Little interest or pleasure in doing things Not at all   Feeling down, depressed, irritable, or hopeless Not at all   Total Score PHQ 2 0   Trouble falling or staying asleep, or sleeping too much -   Feeling tired or having little energy -   Poor appetite, weight loss, or overeating -   Feeling bad about yourself - or that you are a failure or have let yourself or your family down -   Trouble concentrating on things such as school, work, reading, or watching TV -   Moving or speaking so slowly that other people could have noticed; or the opposite being so fidgety that others notice -   Thoughts of being better off dead, or hurting yourself in some way -   PHQ 9 Score -   How difficult have these problems made it for you to do your work, take care of your home and get along with others -       Learning Assessment:  Learning Assessment 7/28/2020   PRIMARY LEARNER Patient   HIGHEST LEVEL OF EDUCATION - PRIMARY LEARNER  GRADUATED HIGH SCHOOL OR GED   BARRIERS PRIMARY LEARNER NONE   CO-LEARNER CAREGIVER No   PRIMARY LANGUAGE ENGLISH    NEED No   LEARNER PREFERENCE PRIMARY DEMONSTRATION   ANSWERED BY patient   RELATIONSHIP SELF       Abuse Screening:  Abuse Screening Questionnaire 8/21/2020   Do you ever feel afraid of your partner? N   Are you in a relationship with someone who physically or mentally threatens you? N   Is it safe for you to go home? Y       Generalized Anxiety  No flowsheet data found.       Health Maintenance Due   Topic Date Due    DTaP/Tdap/Td series (1 - Tdap) 02/01/1987    Shingrix Vaccine Age 50> (1 of 2) 02/01/2016    Breast Cancer Screen Mammogram  02/01/2016    PAP AKA CERVICAL CYTOLOGY  09/12/2020    A1C test (Diabetic or Prediabetic)  11/26/2020   . Health Maintenance reviewed and discussed and ordered per Provider. Coordination of Care:  1. Have you been to the ER, urgent care clinic since your last visit? Hospitalized since your last visit? no    2. Have you seen or consulted any other health care providers outside of the [address] [place] [address] since your last visit? Include any pap smears or colon screening.  no      Advance Directive:  Discussed 5/20/20

## 2020-12-02 NOTE — PROGRESS NOTES
Jacek Zhou is a 48 y.o. female who was seen by synchronous (real-time) audio-video technology on 12/2/2020 for Follow-up (4 week) and Hypertension    Have been checking her blood pressure at [facility] and her numbers have been 120/101. Denies chest pain and or shortness of breath. She has not been for her labs but agrees to go tomorrow. She will see cardiology again in Jan. And she is taking her propranolol. Assessment & Plan:   Diagnoses and all orders for this visit:    1. Hypertension, unspecified type    2. Counseling on health promotion and disease prevention      Strongly recommend she purchase a blood pressure cuff. Go for fasting labs. Educated on care gaps. Subjective:       Prior to Admission medications    Medication Sig Start Date End Date Taking? Authorizing Provider   ferrous sulfate (IRON) 325 mg (65 mg iron) EC tablet TAKE 1 TABLET BY MOUTH EVERY DAY BEFORE BREAKFAST 11/17/20  Yes Maurisio Martinez NP   ascorbic acid, vitamin C, (VITAMIN C) 250 mg tablet TAKE 1 TABLET BY MOUTH EVERY DAY 11/15/20  Yes Manuel ESCUDERO NP   propranoloL (INDERAL) 40 mg tablet Take 1 tablet by mouth up to three times a day as needed. 8/21/20  Yes Delilah Whitehead,    naproxen EC (EC-Naproxen) 500 mg EC tablet naproxen 500 mg tablet   Take 1 tablet twice a day by oral route as needed for 60 days. Yes Provider, Historical   ergocalciferol (ERGOCALCIFEROL) 1,250 mcg (50,000 unit) capsule Take 1 Cap by mouth every seven (7) days. 5/20/20  Yes Dez Cheney NP   tiZANidine (ZANAFLEX) 4 mg tablet Take 4 mg by mouth two (2) times daily as needed. Yes Provider, Historical   gabapentin (NEURONTIN) 300 mg capsule TAKE 1 CAPSULE BY MOUTH AT DINNER TIME AND 1 CAPSULE BY MOUTH AT BEDTIME 9/11/19  Yes Provider, Historical   DULoxetine (CYMBALTA) 60 mg capsule duloxetine 60 mg capsule,delayed release   TAKE 1 CAPSULE(S) EVERY DAY BY ORAL ROUTE FOR 15 DAYS.    Yes Provider, Historical escitalopram oxalate (LEXAPRO) 20 mg tablet Take 1 Tab by mouth daily. 1/22/19 12/2/20  Priscila Driver MD     Patient Active Problem List   Diagnosis Code    Moderate major depression ([facility] [address] F32.1    Depression F32.9    HTN (hypertension) I10    Mixed hyperlipidemia E78.2    Uterine leiomyoma D25.9    Vitamin D deficiency E55.9    GRETCHEN (iron deficiency anemia) D50.9    Other chronic pain G89.29    Chronic left shoulder pain M25.512, G89.29    Other chest pain R07.89    RLS (restless legs syndrome) G25.81    Obesity (BMI 30-39. 9) E66.9    Other viral warts B07.8    Prediabetes R73.03     Patient Active Problem List    Diagnosis Date Noted    Prediabetes 05/20/2020    Other viral warts 02/27/2020    Obesity (BMI 30-39.9) 11/26/2019    RLS (restless legs syndrome) 09/12/2019    Other chest pain 08/14/2019    Other chronic pain 04/25/2019    Chronic left shoulder pain 04/25/2019    Moderate major depression ([facility] [address] 01/03/2019    Mixed hyperlipidemia 03/20/2018    Vitamin D deficiency 03/20/2018    Uterine leiomyoma 09/12/2017    Depression 06/15/2017    HTN (hypertension) 06/15/2017    GRETCHEN (iron deficiency anemia) 06/15/2017     Current Outpatient Medications   Medication Sig Dispense Refill    ferrous sulfate (IRON) 325 mg (65 mg iron) EC tablet TAKE 1 TABLET BY MOUTH EVERY DAY BEFORE BREAKFAST 30 Tab 0    ascorbic acid, vitamin C, (VITAMIN C) 250 mg tablet TAKE 1 TABLET BY MOUTH EVERY DAY 30 Tab 0    propranoloL (INDERAL) 40 mg tablet Take 1 tablet by mouth up to three times a day as needed. 90 Tab 5    naproxen EC (EC-Naproxen) 500 mg EC tablet naproxen 500 mg tablet   Take 1 tablet twice a day by oral route as needed for 60 days.  ergocalciferol (ERGOCALCIFEROL) 1,250 mcg (50,000 unit) capsule Take 1 Cap by mouth every seven (7) days. 8 Cap 0    tiZANidine (ZANAFLEX) 4 mg tablet Take 4 mg by mouth two (2) times daily as needed.       gabapentin (NEURONTIN) 300 mg capsule TAKE 1 CAPSULE BY MOUTH AT DINNER TIME AND 1 CAPSULE BY MOUTH AT BEDTIME  0    DULoxetine (CYMBALTA) 60 mg capsule duloxetine 60 mg capsule,delayed release   TAKE 1 CAPSULE(S) EVERY DAY BY ORAL ROUTE FOR 15 DAYS. Allergies   Allergen Reactions    Prednisone Other (comments)     Coughs up blood       History reviewed. No pertinent past medical history. Past Surgical History:   Procedure Laterality Date    HX SHOULDER ARTHROSCOPY Left 2017 and oct 2018     Family History   Problem Relation Age of Onset    Diabetes Mother     Heart Attack Mother     Diabetes Father     Lupus Sister     Depression Other     Depression Paternal Uncle      Social History     Tobacco Use    Smoking status: Never Smoker    Smokeless tobacco: Never Used   Substance Use Topics    Alcohol use: No     Frequency: Never       Review of Systems   Eyes: Negative for blurred vision. Respiratory: Negative for shortness of breath. Cardiovascular: Negative for chest pain and leg swelling. Gastrointestinal: Negative for blood in stool, constipation, nausea and vomiting. Genitourinary: Negative. Musculoskeletal: Negative for falls. Neurological: Negative for dizziness. Objective:   No flowsheet data found.      [INSTRUCTIONS:  \"[x]\" Indicates a positive item  \"[]\" Indicates a negative item  -- DELETE ALL ITEMS NOT EXAMINED]    Constitutional: [x] Appears well-developed and well-nourished [x] No apparent distress      [] Abnormal -     Mental status: [x] Alert and awake  [x] Oriented to person/place/time [x] Able to follow commands    [] Abnormal -     Eyes:   EOM    [x]  Normal    [] Abnormal -   Sclera  [x]  Normal    [] Abnormal -          Discharge [x]  None visible   [] Abnormal -     HENT: [x] Normocephalic, atraumatic  [] Abnormal -   [x] Mouth/Throat: Mucous membranes are moist    External Ears [x] Normal  [] Abnormal -    Neck: [x] No visualized mass [] Abnormal -     Pulmonary/Chest: [x] Respiratory effort normal   [x] No visualized signs of difficulty breathing or respiratory distress        [] Abnormal -      Musculoskeletal:   [x] Normal gait with no signs of ataxia         [x] Normal range of motion of neck        [] Abnormal -     Neurological:        [x] No Facial Asymmetry (Cranial nerve 7 motor function) (limited exam due to video visit)          [x] No gaze palsy        [] Abnormal -          Skin:        [x] No significant exanthematous lesions or discoloration noted on facial skin         [] Abnormal -            Psychiatric:       [x] Normal Affect [] Abnormal -        [x] No Hallucinations    We discussed the expected course, resolution and complications of the diagnosis(es) in detail. Medication risks, benefits, costs, interactions, and alternatives were discussed as indicated. I advised her to contact the office if her condition worsens, changes or fails to improve as anticipated. She expressed understanding with the diagnosis(es) and plan. Raeann Shelley, who was evaluated through a patient-initiated, synchronous (real-time) audio-video encounter, and/or her healthcare decision maker, is aware that it is a billable service, with coverage as determined by her insurance carrier. She provided verbal consent to proceed: Yes, and patient identification was verified. It was conducted pursuant to the emergency declaration under the [facility] [address] authority and the [address] and [employer] Act. A caregiver was present when appropriate. Ability to conduct physical exam was limited. I was in the office. The patient was at home.       Jame Dougherty NP

## 2020-12-07 ENCOUNTER — HOSPITAL ENCOUNTER (OUTPATIENT)
Dept: LAB | Age: 55
Discharge: HOME | End: 2020-12-07
Payer: MEDICAID

## 2020-12-07 DIAGNOSIS — I10 HYPERTENSION, UNSPECIFIED TYPE: ICD-10-CM

## 2020-12-07 DIAGNOSIS — M25.512 CHRONIC LEFT SHOULDER PAIN: ICD-10-CM

## 2020-12-07 DIAGNOSIS — D50.8 OTHER IRON DEFICIENCY ANEMIA: ICD-10-CM

## 2020-12-07 DIAGNOSIS — G89.29 CHRONIC LEFT SHOULDER PAIN: ICD-10-CM

## 2020-12-07 LAB
ALBUMIN SERPL-MCNC: 3.6 G/DL (ref 3.4–5)
ALBUMIN/GLOB SERPL: 1.1 {RATIO} (ref 0.8–1.7)
ALP SERPL-CCNC: 74 U/L (ref 45–117)
ALT SERPL-CCNC: 40 U/L (ref 13–56)
ANION GAP SERPL CALC-SCNC: 3 MMOL/L (ref 3–18)
AST SERPL-CCNC: 31 U/L (ref 10–38)
BASOPHILS # BLD: 0 K/UL (ref 0–0.1)
BASOPHILS NFR BLD: 1 % (ref 0–2)
BILIRUB SERPL-MCNC: 0.9 MG/DL (ref 0.2–1)
BUN SERPL-MCNC: 10 MG/DL (ref 7–18)
BUN/CREAT SERPL: 11 (ref 12–20)
CALCIUM SERPL-MCNC: 9.5 MG/DL (ref 8.5–10.1)
CHLORIDE SERPL-SCNC: 108 MMOL/L (ref 100–111)
CHOLEST SERPL-MCNC: 185 MG/DL
CO2 SERPL-SCNC: 31 MMOL/L (ref 21–32)
CREAT SERPL-MCNC: 0.88 MG/DL (ref 0.6–1.3)
DIFFERENTIAL METHOD BLD: ABNORMAL
EOSINOPHIL # BLD: 0.1 K/UL (ref 0–0.4)
EOSINOPHIL NFR BLD: 2 % (ref 0–5)
ERYTHROCYTE [DISTWIDTH] IN BLOOD BY AUTOMATED COUNT: 14.4 % (ref 11.6–14.5)
GLOBULIN SER CALC-MCNC: 3.2 G/DL (ref 2–4)
GLUCOSE SERPL-MCNC: 75 MG/DL (ref 74–99)
HCT VFR BLD AUTO: 38.2 % (ref 35–45)
HDLC SERPL-MCNC: 68 MG/DL (ref 40–60)
HDLC SERPL: 2.7 {RATIO} (ref 0–5)
HGB BLD-MCNC: 13.4 G/DL (ref 12–16)
LDLC SERPL CALC-MCNC: 100.4 MG/DL (ref 0–100)
LIPID PROFILE,FLP: ABNORMAL
LYMPHOCYTES # BLD: 1.8 K/UL (ref 0.9–3.6)
LYMPHOCYTES NFR BLD: 47 % (ref 21–52)
MCH RBC QN AUTO: 27.4 PG (ref 24–34)
MCHC RBC AUTO-ENTMCNC: 35.1 G/DL (ref 31–37)
MCV RBC AUTO: 78.1 FL (ref 74–97)
MONOCYTES # BLD: 0.4 K/UL (ref 0.05–1.2)
MONOCYTES NFR BLD: 11 % (ref 3–10)
NEUTS SEG # BLD: 1.5 K/UL (ref 1.8–8)
NEUTS SEG NFR BLD: 39 % (ref 40–73)
PLATELET # BLD AUTO: 223 K/UL (ref 135–420)
PMV BLD AUTO: 10.6 FL (ref 9.2–11.8)
POTASSIUM SERPL-SCNC: 4.2 MMOL/L (ref 3.5–5.5)
PROT SERPL-MCNC: 6.8 G/DL (ref 6.4–8.2)
RBC # BLD AUTO: 4.89 M/UL (ref 4.2–5.3)
SODIUM SERPL-SCNC: 142 MMOL/L (ref 136–145)
T4 FREE SERPL-MCNC: 1.1 NG/DL (ref 0.7–1.5)
TRIGL SERPL-MCNC: 83 MG/DL (ref ?–150)
TSH SERPL DL<=0.05 MIU/L-ACNC: 0.73 UIU/ML (ref 0.36–3.74)
VLDLC SERPL CALC-MCNC: 16.6 MG/DL
WBC # BLD AUTO: 3.8 K/UL (ref 4.6–13.2)

## 2020-12-07 PROCEDURE — 85025 COMPLETE CBC W/AUTO DIFF WBC: CPT

## 2020-12-07 PROCEDURE — 84439 ASSAY OF FREE THYROXINE: CPT

## 2020-12-07 PROCEDURE — 36415 COLL VENOUS BLD VENIPUNCTURE: CPT

## 2020-12-07 PROCEDURE — 80061 LIPID PANEL: CPT

## 2020-12-07 PROCEDURE — 80053 COMPREHEN METABOLIC PANEL: CPT

## 2020-12-07 PROCEDURE — 84443 ASSAY THYROID STIM HORMONE: CPT

## 2020-12-08 ENCOUNTER — TELEPHONE (OUTPATIENT)
Dept: FAMILY MEDICINE CLINIC | Age: 55
End: 2020-12-08

## 2020-12-08 NOTE — PROGRESS NOTES
Patient returned call. She verified her name, , and address. We discussed her labs. She is not currently having any signs of infection including fevers, cough, or congestion.  AMANDA Albright, OFELIA-C

## 2020-12-08 NOTE — TELEPHONE ENCOUNTER
Call to patient to discuss labs. No answer. Message left informing her that I called with no information given.  AMANDA Albright, FNP-C

## 2020-12-18 ENCOUNTER — HOSPITAL ENCOUNTER (OUTPATIENT)
Dept: PHYSICAL THERAPY | Age: 55
Discharge: HOME | End: 2020-12-18
Payer: MEDICAID

## 2020-12-18 PROCEDURE — 97165 OT EVAL LOW COMPLEX 30 MIN: CPT

## 2020-12-18 PROCEDURE — L3933 FO W/O JOINTS CF: HCPCS

## 2020-12-18 NOTE — PROGRESS NOTES
[facility]  [address]  [place], [address]  [phone] fax    Orthotic Assessment/Occupational Therapy Discharge Summary    Patient name: Chantel Johnson Start of Care: 2020   Referral source: Enrique Soulier, DO : 1965   Medical/Treatment Diagnosis: Right hand pain [M79.641] Onset Date:2020     Prior Hospitalization: see medical history Provider#: [id number]   Medications: Verified on Patient Summary List    Comorbidities: Depression, HTN  Prior Level of Function:Independent with ADL/IADL tasks without functional limitations on right hand. Summary of Care:    Hand Therapy Orthotic Assessment   Precautions: Total Assessment Time:    In time:2:45 PM  Out time:3:25 PM  Total Treatment Time (min): 40  Total Timed Codes (min): 40  1:1 Treatment Time (MC only): 40   Visit #: 1 of 1      History of Present Condition:  Patient is a left hand dominant 48 y.o. female being referred to therapy for fabrication of custom extension splint for the right IF DIP jt. .     Patient Goals:  Attain well fitted custom DIP jt extension splint for the right IF     Mechanism of Injury: hit tip of digit on floor     Orthotic Goals: Extension of the DIP jt of the right IF     Orthotic Plan: well fitting DIP jt extension splint to be worn continuously     Type of Orthotic(s) issued: custom FO    L Code:        ASSESSMENT/RECOMMENDATIONS: Patients splint appears to be fitting well and patient is able to vebalize understanding of wear, care and precautions. [x]Discontinue therapy: [x]Patient has reached or is progressing toward set goals      []Patient is non-compliant or has abdicated      []Due to lack of appreciable progress towards set goals        Therapist Goals: To be achieved 2020.   Patient/Caregiver instructed in orthotic wear, care, and precautions GOAL MET ON ABOVE DATE  Patient will demonstrate a good understanding of their condition and strategies for self-management. Written orthotic instructions given GOAL MET ON ABOVE DATE  Patient demonstrated independent donning and doffing of orthotic(s) GOAL MET ON ABOVE DATE      Plan of Care:  Patient will contact the [facility] at the number provided if any complications arise for orthotic revision. Thank you for the referral. Please do not hesitate to contact me with any questions or concerns. Burlington Congress, OT 12/18/2020 2:51 PM    ________________________________________________________________________    I certify that the above Therapy Services are being furnished while the patient is under my care. I agree with the treatment plan and certify that this therapy is necessary.     Physician's Signature:____________________  Date:____________Time:__________    Please sign and return to [facility] [address] [place] [address] & [facility]  [address] [place] [address], [facility] [address]  [phone] fax

## 2021-01-11 ENCOUNTER — OFFICE VISIT (OUTPATIENT)
Dept: ORTHOPEDIC SURGERY | Age: 56
End: 2021-01-11
Payer: MEDICAID

## 2021-01-11 VITALS
BODY MASS INDEX: 32.44 KG/M2 | HEIGHT: 64 IN | DIASTOLIC BLOOD PRESSURE: 91 MMHG | SYSTOLIC BLOOD PRESSURE: 137 MMHG | RESPIRATION RATE: 16 BRPM | OXYGEN SATURATION: 97 % | TEMPERATURE: 98.6 F | HEART RATE: 93 BPM | WEIGHT: 190 LBS

## 2021-01-11 DIAGNOSIS — M65.341 TRIGGER RING FINGER OF RIGHT HAND: ICD-10-CM

## 2021-01-11 DIAGNOSIS — G56.01 RIGHT CARPAL TUNNEL SYNDROME: ICD-10-CM

## 2021-01-11 DIAGNOSIS — M65.331 TRIGGER MIDDLE FINGER OF RIGHT HAND: ICD-10-CM

## 2021-01-11 DIAGNOSIS — M20.011 MALLET DEFORMITY OF RIGHT INDEX FINGER: Primary | ICD-10-CM

## 2021-01-11 PROCEDURE — 20526 THER INJECTION CARP TUNNEL: CPT | Performed by: ORTHOPAEDIC SURGERY

## 2021-01-11 PROCEDURE — 99214 OFFICE O/P EST MOD 30 MIN: CPT | Performed by: ORTHOPAEDIC SURGERY

## 2021-01-11 PROCEDURE — 20550 NJX 1 TENDON SHEATH/LIGAMENT: CPT | Performed by: ORTHOPAEDIC SURGERY
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
## 2021-01-12 NOTE — PROGRESS NOTES
Tran Choi is a 48 y.o. female who was seen by synchronous (real-time) audio-video technology on 1/13/2021 for Discuss Medications (feel like Cymbalta is not helping anxiety and depression. Request script for Vit C) and Other (PHQ  26          JL  20)    She reports she is not seeing a counselor. She reports this did not work out last year and she needs to find another one. She has been on cymbalta for years. She does not think that the Cymbalta is working. She does not exercise and she does not get out of the house. She does not eat 3x a day or some days. She does have depression and feels that it is worsening. She denies desire to to hurt or harm herself and or others. However she says that she has had those thoughts in the past of hurting herself. She is requesting a new medication. She reports she lives alone. She reports her depression is from having to deal with shoulder pain and not being able to get the ROM back in her left her arm. She reports she lives by herself. She reports she has a friend that calls her everyday to make sure she is ok along with a cousin that checks on her daily. She declines inpatient admission. She is in pain management for her shoulder. She does go to [facility]. She is having some chest pain and she does have an appointment with cardiology this month. She had labs one month ago. Assessment & Plan:   Diagnoses and all orders for this visit:    1. Severe episode of recurrent major depressive disorder, without psychotic features ([facility]  -     REFERRAL TO PSYCHIATRY  -     buPROPion XL (WELLBUTRIN XL) 150 mg tablet; Take 1 Tab by mouth every morning. 2. Other iron deficiency anemia    3. Chest pain, unspecified type  -     EKG, 12 LEAD, INITIAL; Future    4. Chronic left shoulder pain    5. Encounter to discuss test results    6. Anxiety  -     buPROPion XL (WELLBUTRIN XL) 150 mg tablet; Take 1 Tab by mouth every morning.     I did offer patient inpatient evaluation/admission for her depression and she has declined. She agrees to go outside to walk or get out the house at least 2-3x by our next visit. I have encouraged her to eat nutritional meals. She agrees to go and  her own medications today. She agrees to talk with her cousin and friend daily. We have discussed sleep hygiene. She also agrees to find something she likes to do such as reading, cross word puzzles, or a hobby. She agrees to practice some mindfulness and or meditation. I have given her options for online counseling and she agrees to look into these today. She also agrees to find her an area psychiatrist and I have given her some options for  Monique Skinner in [facility]. I have given her the suicide prevention line. I have added on Wellbutrin to her medication regimen. I did review her last labs with her and we will hold on the vitamin C at this time. I have also offered and ordered an EKG. She is to follow-up with her cardiologist.  She is also to follow-up with pain management for her chronic shoulder pain. I spent at least 40 minutes on this visit with this established patient. We have discussed her depression, set small goals, discussed her medications, iron deficiency anemia, follow-up with cardiology, labs, diet, exercise, and referral.    Subjective:       Prior to Admission medications    Medication Sig Start Date End Date Taking? Authorizing Provider   buPROPion XL (WELLBUTRIN XL) 150 mg tablet Take 1 Tab by mouth every morning. 1/13/21  Yes Fatimah ESCUDERO NP   ferrous sulfate (IRON) 325 mg (65 mg iron) EC tablet TAKE 1 TABLET BY MOUTH EVERY DAY BEFORE BREAKFAST 12/18/20  Yes Fatimah ESCUDERO NP   ascorbic acid, vitamin C, (VITAMIN C) 250 mg tablet TAKE 1 TABLET BY MOUTH EVERY DAY 11/15/20  Yes Fatimah ESCUDERO NP   propranoloL (INDERAL) 40 mg tablet Take 1 tablet by mouth up to three times a day as needed.  8/21/20  Yes Delilah Ricks, DO   naproxen EC (EC-Naproxen) 500 mg EC tablet naproxen 500 mg tablet   Take 1 tablet twice a day by oral route as needed for 60 days. Yes Provider, Historical   tiZANidine (ZANAFLEX) 4 mg tablet Take 4 mg by mouth two (2) times daily as needed. Yes Provider, Historical   gabapentin (NEURONTIN) 300 mg capsule TAKE 1 CAPSULE BY MOUTH AT DINNER TIME AND 1 CAPSULE BY MOUTH AT BEDTIME 9/11/19  Yes Provider, Historical   DULoxetine (CYMBALTA) 60 mg capsule duloxetine 60 mg capsule,delayed release   TAKE 1 CAPSULE(S) EVERY DAY BY ORAL ROUTE FOR 15 DAYS. Yes Provider, Historical   ergocalciferol (ERGOCALCIFEROL) 1,250 mcg (50,000 unit) capsule Take 1 Cap by mouth every seven (7) days. 5/20/20   Dileep Higgins NP     Patient Active Problem List   Diagnosis Code    Moderate major depression ([facility] F32.1    Depression F32.9    HTN (hypertension) I10    Mixed hyperlipidemia E78.2    Uterine leiomyoma D25.9    Vitamin D deficiency E55.9    GRETCHEN (iron deficiency anemia) D50.9    Other chronic pain G89.29    Chronic left shoulder pain M25.512, G89.29    Other chest pain R07.89    RLS (restless legs syndrome) G25.81    Obesity (BMI 30-39. 9) E66.9    Other viral warts B07.8    Prediabetes R73.03     Patient Active Problem List    Diagnosis Date Noted    Prediabetes 05/20/2020    Other viral warts 02/27/2020    Obesity (BMI 30-39.9) 11/26/2019    RLS (restless legs syndrome) 09/12/2019    Other chest pain 08/14/2019    Other chronic pain 04/25/2019    Chronic left shoulder pain 04/25/2019    Moderate major depression ([facility] 01/03/2019    Mixed hyperlipidemia 03/20/2018    Vitamin D deficiency 03/20/2018    Uterine leiomyoma 09/12/2017    Depression 06/15/2017    HTN (hypertension) 06/15/2017    GRETCHEN (iron deficiency anemia) 06/15/2017     Current Outpatient Medications   Medication Sig Dispense Refill    buPROPion XL (WELLBUTRIN XL) 150 mg tablet Take 1 Tab by mouth every morning.  30 Tab 0    ferrous sulfate (IRON) 325 mg (65 mg iron) EC tablet TAKE 1 TABLET BY MOUTH EVERY DAY BEFORE BREAKFAST 30 Tab 0    ascorbic acid, vitamin C, (VITAMIN C) 250 mg tablet TAKE 1 TABLET BY MOUTH EVERY DAY 30 Tab 0    propranoloL (INDERAL) 40 mg tablet Take 1 tablet by mouth up to three times a day as needed. 90 Tab 5    naproxen EC (EC-Naproxen) 500 mg EC tablet naproxen 500 mg tablet   Take 1 tablet twice a day by oral route as needed for 60 days.  tiZANidine (ZANAFLEX) 4 mg tablet Take 4 mg by mouth two (2) times daily as needed.  gabapentin (NEURONTIN) 300 mg capsule TAKE 1 CAPSULE BY MOUTH AT DINNER TIME AND 1 CAPSULE BY MOUTH AT BEDTIME  0    DULoxetine (CYMBALTA) 60 mg capsule duloxetine 60 mg capsule,delayed release   TAKE 1 CAPSULE(S) EVERY DAY BY ORAL ROUTE FOR 15 DAYS.  ergocalciferol (ERGOCALCIFEROL) 1,250 mcg (50,000 unit) capsule Take 1 Cap by mouth every seven (7) days. 8 Cap 0     Allergies   Allergen Reactions    Prednisone Other (comments)     Coughs up blood       History reviewed. No pertinent past medical history. Past Surgical History:   Procedure Laterality Date    HX SHOULDER ARTHROSCOPY Left 2017 and oct 2018     Family History   Problem Relation Age of Onset    Diabetes Mother     Heart Attack Mother     Diabetes Father     Lupus Sister     Depression Other     Depression Paternal Uncle      Social History     Tobacco Use    Smoking status: Never Smoker    Smokeless tobacco: Never Used   Substance Use Topics    Alcohol use: No     Frequency: Never       Review of Systems   Constitutional: Negative for fever. Respiratory: Negative for shortness of breath. Cardiovascular: Positive for chest pain. Gastrointestinal: Negative for abdominal pain, nausea and vomiting. Psychiatric/Behavioral: Positive for depression. Negative for suicidal ideas. The patient is nervous/anxious and has insomnia. Objective:   No flowsheet data found.      [INSTRUCTIONS:  \"[x]\" Indicates a positive item  \"[]\" Indicates a negative item  -- DELETE ALL ITEMS NOT EXAMINED]    Constitutional: [x] Appears well-developed and well-nourished [x] No apparent distress      [] Abnormal -     Mental status: [x] Alert and awake  [x] Oriented to person/place/time [x] Able to follow commands    [] Abnormal -     Eyes:   EOM    [x]  Normal    [] Abnormal -   Sclera  [x]  Normal    [] Abnormal -          Discharge [x]  None visible   [] Abnormal -     HENT: [x] Normocephalic, atraumatic  [] Abnormal -   [x] Mouth/Throat: Mucous membranes are moist    External Ears [x] Normal  [] Abnormal -    Neck: [x] No visualized mass [] Abnormal -     Pulmonary/Chest: [x] Respiratory effort normal   [x] No visualized signs of difficulty breathing or respiratory distress        [] Abnormal -      Musculoskeletal:   [x] Normal gait with no signs of ataxia         [x] Normal range of motion of neck        [] Abnormal -     Neurological:        [x] No Facial Asymmetry (Cranial nerve 7 motor function) (limited exam due to video visit)          [x] No gaze palsy        [] Abnormal -          Skin:        [x] No significant exanthematous lesions or discoloration noted on facial skin         [] Abnormal -            Psychiatric:       [] Normal Affect [x] Abnormal - flat affect, tearful, non-communicative with some questions, slow in answering questions, no direct eye contact. [x] No Hallucinations    We discussed the expected course, resolution and complications of the diagnosis(es) in detail. Medication risks, benefits, costs, interactions, and alternatives were discussed as indicated. I advised her to contact the office if her condition worsens, changes or fails to improve as anticipated. She expressed understanding with the diagnosis(es) and plan.        Naveen Pruett, who was evaluated through a patient-initiated, synchronous (real-time) audio-video encounter, and/or her healthcare decision maker, is aware that it is a billable service, with coverage as determined by her insurance carrier. She provided verbal consent to proceed: Yes, and patient identification was verified. It was conducted pursuant to the emergency declaration under the [address] [place] [address] authority and the [employer] General Act. A caregiver was present when appropriate. Ability to conduct physical exam was limited. I was at home. The patient was at home.       Rafael Andrade NP

## 2021-01-13 ENCOUNTER — VIRTUAL VISIT (OUTPATIENT)
Dept: FAMILY MEDICINE CLINIC | Age: 56
End: 2021-01-13
Payer: MEDICAID

## 2021-01-13 DIAGNOSIS — D50.8 OTHER IRON DEFICIENCY ANEMIA: ICD-10-CM

## 2021-01-13 DIAGNOSIS — M25.512 CHRONIC LEFT SHOULDER PAIN: ICD-10-CM

## 2021-01-13 DIAGNOSIS — R07.9 CHEST PAIN, UNSPECIFIED TYPE: ICD-10-CM

## 2021-01-13 DIAGNOSIS — Z71.2 ENCOUNTER TO DISCUSS TEST RESULTS: ICD-10-CM

## 2021-01-13 DIAGNOSIS — F41.9 ANXIETY: ICD-10-CM

## 2021-01-13 DIAGNOSIS — F33.2 SEVERE EPISODE OF RECURRENT MAJOR DEPRESSIVE DISORDER, WITHOUT PSYCHOTIC FEATURES (HCC): Primary | ICD-10-CM

## 2021-01-13 DIAGNOSIS — G89.29 CHRONIC LEFT SHOULDER PAIN: ICD-10-CM

## 2021-01-13 PROCEDURE — 99215 OFFICE O/P EST HI 40 MIN: CPT | Performed by: NURSE PRACTITIONER

## 2021-01-13 RX ORDER — ASCORBIC ACID 250 MG
TABLET ORAL
Qty: 90 TAB | Refills: 1 | Status: CANCELLED | OUTPATIENT
Start: 2021-01-13

## 2021-01-13 RX ORDER — BUPROPION HYDROCHLORIDE 150 MG/1
150 TABLET ORAL
Qty: 30 TAB | Refills: 0 | Status: SHIPPED | OUTPATIENT
Start: 2021-01-13 | End: 2021-02-02 | Stop reason: SDUPTHER

## 2021-01-13 NOTE — PROGRESS NOTES
Che Wild presents today for   Chief Complaint   Patient presents with    Discuss Medications     feel like Cymbalta is not helping anxiety and depression. Request script for Andrea Weems Other     PHQ  26          JL  20       Che Wild preferred language for health care discussion is english/other. Is someone accompanying this pt? no    Is the patient using any DME equipment during [address]? no    Depression Screening:  3 most recent PHQ Screens 1/13/2021   PHQ Not Done -   Little interest or pleasure in doing things Nearly every day   Feeling down, depressed, irritable, or hopeless Nearly every day   Total Score PHQ 2 6   Trouble falling or staying asleep, or sleeping too much Nearly every day   Feeling tired or having little energy Nearly every day   Poor appetite, weight loss, or overeating More than half the days   Feeling bad about yourself - or that you are a failure or have let yourself or your family down Nearly every day   Trouble concentrating on things such as school, work, reading, or watching TV Nearly every day   Moving or speaking so slowly that other people could have noticed; or the opposite being so fidgety that others notice Nearly every day   Thoughts of being better off dead, or hurting yourself in some way Nearly every day   PHQ 9 Score 26   How difficult have these problems made it for you to do your work, take care of your home and get along with others Somewhat difficult       Learning Assessment:  Learning Assessment 1/13/2021   PRIMARY LEARNER Patient   HIGHEST LEVEL OF EDUCATION - PRIMARY LEARNER  GRADUATED HIGH SCHOOL OR GED   BARRIERS PRIMARY LEARNER NONE   CO-LEARNER CAREGIVER No   PRIMARY LANGUAGE ENGLISH    NEED No   LEARNER PREFERENCE PRIMARY DEMONSTRATION   ANSWERED BY patient   RELATIONSHIP SELF       Abuse Screening:  Abuse Screening Questionnaire 1/13/2021   Do you ever feel afraid of your partner?  N   Are you in a relationship with someone who physically or mentally threatens you? N   Is it safe for you to go home? Y       Generalized Anxiety  JL 2/7 1/13/2021   Feeling nervous, anxious or on edge? 3   Not being able to stop or control worrying? 3   JL-2 Subtotal 6   Worrying too much about different things? 3   Trouble relaxing? 3   Being so restless that it is hard to sit still? 3   Becoming easily annoyed or irritable? 3   Feeling afraid as if something awful might happen? 2   JL-7 Total Score 20   If you checked off any problems, how difficult have these problems made it for you to do your work, take care of thinks at home, or get along with other people? Very difficult         Health Maintenance Due   Topic Date Due    DTaP/Tdap/Td series (1 - Tdap) 02/01/1987    Shingrix Vaccine Age 50> (1 of 2) 02/01/2016    Breast Cancer Screen Mammogram  02/01/2016    PAP AKA CERVICAL CYTOLOGY  09/12/2020    A1C test (Diabetic or Prediabetic)  11/26/2020   . Health Maintenance reviewed and discussed and ordered per Provider. Coordination of Care:  1. Have you been to the ER, urgent care clinic since your last visit? Hospitalized since your last visit? no    2. Have you seen or consulted any other health care providers outside of the [address] [place] [address] since your last visit? Include any pap smears or colon screening. no      Advance Directive:  1. Do you have an advance directive in place?  Patient Reply:no

## 2021-01-16 DIAGNOSIS — D50.8 OTHER IRON DEFICIENCY ANEMIA: ICD-10-CM

## 2021-01-18 DIAGNOSIS — G56.01 RIGHT CARPAL TUNNEL SYNDROME: ICD-10-CM

## 2021-01-18 RX ORDER — FERROUS SULFATE 325(65) MG
TABLET, DELAYED RELEASE (ENTERIC COATED) ORAL
Qty: 30 TAB | Refills: 0 | Status: SHIPPED | OUTPATIENT
Start: 2021-01-18 | End: 2021-08-16

## 2021-01-20 ENCOUNTER — VIRTUAL VISIT (OUTPATIENT)
Dept: FAMILY MEDICINE CLINIC | Age: 56
End: 2021-01-20
Payer: MEDICAID

## 2021-01-20 DIAGNOSIS — F33.2 SEVERE EPISODE OF RECURRENT MAJOR DEPRESSIVE DISORDER, WITHOUT PSYCHOTIC FEATURES (HCC): Primary | ICD-10-CM

## 2021-01-20 PROCEDURE — 99213 OFFICE O/P EST LOW 20 MIN: CPT | Performed by: NURSE PRACTITIONER

## 2021-01-20 NOTE — PROGRESS NOTES
Dionne Yu is a 48 y.o. female who was seen by synchronous (real-time) audio-video technology on 1/20/2021 for Follow-up (1 week), Anxiety (JL  21), and Depression (PHQ  27)    She reports her son did take her out yesterday. She reports she is not really feeling any different. She has not set her appointments or spoken with a counselor. She reports taking her medications as prescribed. She reports she has had some thoughts of hurting herself. She denies having a plan for suicide or for hurting herself. She reports her friend and cousin call her daily. She also sees her son. She declines any inpatient care. Assessment & Plan:   Diagnoses and all orders for this visit:    1. Severe episode of recurrent major depressive disorder, without psychotic features ([facility]    Offered inpatient care and she has declined. She does still have the suicide prevention help line and agrees to use this if needed. We have set small goals today and she agrees to make her counseling appointments. She also agrees to continue to take her medications and to go out more. More than half of 15 min visit spent counseling and coordinating care with patient on depression. Subjective:       Prior to Admission medications    Medication Sig Start Date End Date Taking? Authorizing Provider   ferrous sulfate (IRON) 325 mg (65 mg iron) EC tablet TAKE 1 TABLET BY MOUTH EVERY DAY BEFORE BREAKFAST 1/18/21  Yes Deirdre ESCUDERO NP   buPROPion XL (WELLBUTRIN XL) 150 mg tablet Take 1 Tab by mouth every morning. 1/13/21  Yes Deirdre ESCUDERO NP   ascorbic acid, vitamin C, (VITAMIN C) 250 mg tablet TAKE 1 TABLET BY MOUTH EVERY DAY 11/15/20  Yes Deirdre ESCUDERO NP   propranoloL (INDERAL) 40 mg tablet Take 1 tablet by mouth up to three times a day as needed. 8/21/20  Yes Delilah Ku, DO   naproxen EC (EC-Naproxen) 500 mg EC tablet naproxen 500 mg tablet   Take 1 tablet twice a day by oral route as needed for 60 days.    Yes Provider, Historical   ergocalciferol (ERGOCALCIFEROL) 1,250 mcg (50,000 unit) capsule Take 1 Cap by mouth every seven (7) days. 5/20/20  Yes Maya Kang NP   tiZANidine (ZANAFLEX) 4 mg tablet Take 4 mg by mouth two (2) times daily as needed. Yes Provider, Historical   gabapentin (NEURONTIN) 300 mg capsule TAKE 1 CAPSULE BY MOUTH AT DINNER TIME AND 1 CAPSULE BY MOUTH AT BEDTIME 9/11/19  Yes Provider, Historical   DULoxetine (CYMBALTA) 60 mg capsule duloxetine 60 mg capsule,delayed release   TAKE 1 CAPSULE(S) EVERY DAY BY ORAL ROUTE FOR 15 DAYS. Yes Provider, Historical     Patient Active Problem List   Diagnosis Code    Moderate major depression ([facility] F32.1    Depression F32.9    HTN (hypertension) I10    Mixed hyperlipidemia E78.2    Uterine leiomyoma D25.9    Vitamin D deficiency E55.9    GRETCHEN (iron deficiency anemia) D50.9    Other chronic pain G89.29    Chronic left shoulder pain M25.512, G89.29    Other chest pain R07.89    RLS (restless legs syndrome) G25.81    Obesity (BMI 30-39. 9) E66.9    Other viral warts B07.8    Prediabetes R73.03     Patient Active Problem List    Diagnosis Date Noted    Prediabetes 05/20/2020    Other viral warts 02/27/2020    Obesity (BMI 30-39.9) 11/26/2019    RLS (restless legs syndrome) 09/12/2019    Other chest pain 08/14/2019    Other chronic pain 04/25/2019    Chronic left shoulder pain 04/25/2019    Moderate major depression ([facility] 01/03/2019    Mixed hyperlipidemia 03/20/2018    Vitamin D deficiency 03/20/2018    Uterine leiomyoma 09/12/2017    Depression 06/15/2017    HTN (hypertension) 06/15/2017    GRETCHEN (iron deficiency anemia) 06/15/2017     Current Outpatient Medications   Medication Sig Dispense Refill    ferrous sulfate (IRON) 325 mg (65 mg iron) EC tablet TAKE 1 TABLET BY MOUTH EVERY DAY BEFORE BREAKFAST 30 Tab 0    buPROPion XL (WELLBUTRIN XL) 150 mg tablet Take 1 Tab by mouth every morning.  30 Tab 0    ascorbic acid, vitamin C, (VITAMIN C) 250 mg tablet TAKE 1 TABLET BY MOUTH EVERY DAY 30 Tab 0    propranoloL (INDERAL) 40 mg tablet Take 1 tablet by mouth up to three times a day as needed. 90 Tab 5    naproxen EC (EC-Naproxen) 500 mg EC tablet naproxen 500 mg tablet   Take 1 tablet twice a day by oral route as needed for 60 days.  ergocalciferol (ERGOCALCIFEROL) 1,250 mcg (50,000 unit) capsule Take 1 Cap by mouth every seven (7) days. 8 Cap 0    tiZANidine (ZANAFLEX) 4 mg tablet Take 4 mg by mouth two (2) times daily as needed.  gabapentin (NEURONTIN) 300 mg capsule TAKE 1 CAPSULE BY MOUTH AT DINNER TIME AND 1 CAPSULE BY MOUTH AT BEDTIME  0    DULoxetine (CYMBALTA) 60 mg capsule duloxetine 60 mg capsule,delayed release   TAKE 1 CAPSULE(S) EVERY DAY BY ORAL ROUTE FOR 15 DAYS. Allergies   Allergen Reactions    Prednisone Other (comments)     Coughs up blood       No past medical history on file. Past Surgical History:   Procedure Laterality Date    HX SHOULDER ARTHROSCOPY Left 2017 and oct 2018     Family History   Problem Relation Age of Onset    Diabetes Mother     Heart Attack Mother     Diabetes Father     Lupus Sister     Depression Other     Depression Paternal Uncle      Social History     Tobacco Use    Smoking status: Never Smoker    Smokeless tobacco: Never Used   Substance Use Topics    Alcohol use: No     Frequency: Never       Review of Systems   Respiratory: Negative for shortness of breath. Cardiovascular: Negative for chest pain. Psychiatric/Behavioral: Positive for depression and suicidal ideas. Objective:   No flowsheet data found.      [INSTRUCTIONS:  \"[x]\" Indicates a positive item  \"[]\" Indicates a negative item  -- DELETE ALL ITEMS NOT EXAMINED]    Constitutional: [x] Appears well-developed and well-nourished [x] No apparent distress      [] Abnormal -     Mental status: [x] Alert and awake  [x] Oriented to person/place/time [x] Able to follow commands    [] Abnormal -     Eyes: EOM    [x]  Normal    [] Abnormal -   Sclera  [x]  Normal    [] Abnormal -          Discharge [x]  None visible   [] Abnormal -     HENT: [x] Normocephalic, atraumatic  [] Abnormal -   [x] Mouth/Throat: Mucous membranes are moist    External Ears [x] Normal  [] Abnormal -    Neck: [x] No visualized mass [] Abnormal -     Pulmonary/Chest: [x] Respiratory effort normal   [x] No visualized signs of difficulty breathing or respiratory distress        [] Abnormal -      Musculoskeletal:   [x] Normal gait with no signs of ataxia         [x] Normal range of motion of neck        [] Abnormal -     Neurological:        [x] No Facial Asymmetry (Cranial nerve 7 motor function) (limited exam due to video visit)          [x] No gaze palsy        [] Abnormal -          Skin:        [x] No significant exanthematous lesions or discoloration noted on facial skin         [] Abnormal -            Psychiatric:       [x] Normal Affect [x] Abnormal - tearful        [x] No Hallucinations    We discussed the expected course, resolution and complications of the diagnosis(es) in detail. Medication risks, benefits, costs, interactions, and alternatives were discussed as indicated. I advised her to contact the office if her condition worsens, changes or fails to improve as anticipated. She expressed understanding with the diagnosis(es) and plan. Ann Bowser, who was evaluated through a patient-initiated, synchronous (real-time) audio-video encounter, and/or her healthcare decision maker, is aware that it is a billable service, with coverage as determined by her insurance carrier. She provided verbal consent to proceed: Yes, and patient identification was verified. It was conducted pursuant to the emergency declaration under the [address] [place] [address], [address] [place] [address] and the [employer] General Act. A caregiver was present when appropriate.  Ability to conduct physical exam was limited. I was at home. The patient was at home.       Can Mccarty NP

## 2021-01-20 NOTE — PROGRESS NOTES
Chief Complaint   Patient presents with    Follow-up     1 week    Anxiety     JL  21    Depression     PHQ  [address] [facility] [facility] preferred language for health care discussion is english/other. Is someone accompanying this pt? no    Is the patient using any DME equipment during [address]? no    Depression Screening:  3 most recent PHQ Screens 1/20/2021   PHQ Not Done -   Little interest or pleasure in doing things Nearly every day   Feeling down, depressed, irritable, or hopeless Nearly every day   Total Score PHQ 2 6   Trouble falling or staying asleep, or sleeping too much Nearly every day   Feeling tired or having little energy Nearly every day   Poor appetite, weight loss, or overeating Nearly every day   Feeling bad about yourself - or that you are a failure or have let yourself or your family down Nearly every day   Trouble concentrating on things such as school, work, reading, or watching TV Nearly every day   Moving or speaking so slowly that other people could have noticed; or the opposite being so fidgety that others notice Nearly every day   Thoughts of being better off dead, or hurting yourself in some way Nearly every day   PHQ 9 Score 27   How difficult have these problems made it for you to do your work, take care of your home and get along with others Very difficult       Learning Assessment:  Learning Assessment 1/13/2021   PRIMARY LEARNER Patient   HIGHEST LEVEL OF EDUCATION - PRIMARY LEARNER  GRADUATED HIGH SCHOOL OR GED   BARRIERS PRIMARY LEARNER NONE   CO-LEARNER CAREGIVER No   PRIMARY LANGUAGE ENGLISH    NEED No   LEARNER PREFERENCE PRIMARY DEMONSTRATION   ANSWERED BY patient   RELATIONSHIP SELF       Abuse Screening:  Abuse Screening Questionnaire 1/13/2021   Do you ever feel afraid of your partner? N   Are you in a relationship with someone who physically or mentally threatens you? N   Is it safe for you to go home?  Y       Generalized Anxiety  JL 2/7 1/20/2021 1/13/2021 Feeling nervous, anxious or on edge? 3 3   Not being able to stop or control worrying? 3 3   JL-2 Subtotal 6 6   Worrying too much about different things? 3 3   Trouble relaxing? 3 3   Being so restless that it is hard to sit still? 3 3   Becoming easily annoyed or irritable? 3 3   Feeling afraid as if something awful might happen? 3 2   JL-7 Total Score 21 20   If you checked off any problems, how difficult have these problems made it for you to do your work, take care of thinks at home, or get along with other people? Extremely difficult Very difficult         Health Maintenance Due   Topic Date Due    COVID-19 Vaccine (1 of 2) 02/01/1982    DTaP/Tdap/Td series (1 - Tdap) 02/01/1987    Shingrix Vaccine Age 50> (1 of 2) 02/01/2016    Breast Cancer Screen Mammogram  02/01/2016    PAP AKA CERVICAL CYTOLOGY  09/12/2020    A1C test (Diabetic or Prediabetic)  11/26/2020   . Health Maintenance reviewed and discussed and ordered per Provider. Coordination of Care:  1. Have you been to the ER, urgent care clinic since your last visit? Hospitalized since your last visit? no    2. Have you seen or consulted any other health care providers outside of the [address] [place] [address] since your last visit? Include any pap smears or colon screening.  no

## 2021-01-25 ENCOUNTER — PATIENT MESSAGE (OUTPATIENT)
Dept: FAMILY MEDICINE CLINIC | Age: 56
End: 2021-01-25

## 2021-02-01 NOTE — PROGRESS NOTES
Delano Guzman is a 55 y.o. female who was seen by synchronous (real-time) audio-video technology on 2/2/2021 for No chief complaint on file. Assessment & Plan:  
{There are no diagnoses linked to this encounter. (Refresh or delete this SmartLink)} {time statement optional (Optional):55657} Subjective:  
 
 
Prior to Admission medications Medication Sig Start Date End Date Taking? Authorizing Provider  
ferrous sulfate (IRON) 325 mg (65 mg iron) EC tablet TAKE 1 TABLET BY MOUTH EVERY DAY BEFORE BREAKFAST 1/18/21   Wale ESCUDERO NP  
buPROPion XL (WELLBUTRIN XL) 150 mg tablet Take 1 Tab by mouth every morning. 1/13/21   Wale ESCUDERO NP  
ascorbic acid, vitamin C, (VITAMIN C) 250 mg tablet TAKE 1 TABLET BY MOUTH EVERY DAY 11/15/20   Wale ESCUDERO NP  
propranoloL (INDERAL) 40 mg tablet Take 1 tablet by mouth up to three times a day as needed. 8/21/20   Estefani PERES,   
naproxen EC (EC-Naproxen) 500 mg EC tablet naproxen 500 mg tablet Take 1 tablet twice a day by oral route as needed for 60 days. Provider, Historical  
ergocalciferol (ERGOCALCIFEROL) 1,250 mcg (50,000 unit) capsule Take 1 Cap by mouth every seven (7) days. 5/20/20   Maria Luisa Lucero NP  
tiZANidine (ZANAFLEX) 4 mg tablet Take 4 mg by mouth two (2) times daily as needed. Provider, Historical  
gabapentin (NEURONTIN) 300 mg capsule TAKE 1 CAPSULE BY MOUTH AT DINNER TIME AND 1 CAPSULE BY MOUTH AT BEDTIME 9/11/19   Provider, Historical  
DULoxetine (CYMBALTA) 60 mg capsule duloxetine 60 mg capsule,delayed release TAKE 1 CAPSULE(S) EVERY DAY BY ORAL ROUTE FOR 15 DAYS. Provider, Historical  
 
Patient Active Problem List  
Diagnosis Code  Moderate major depression (HCC) F32.1  Depression F32.9  
 HTN (hypertension) I10  
 Mixed hyperlipidemia E78.2  Uterine leiomyoma D25.9  Vitamin D deficiency E55.9  GRETHCEN (iron deficiency anemia) D50.9  Other chronic pain G89.29  
  Chronic left shoulder pain M25.512, G89.29  
 Other chest pain R07.89  
 RLS (restless legs syndrome) G25.81  
 Obesity (BMI 30-39. 9) E66.9  Other viral warts B07.8  Prediabetes R73.03 Patient Active Problem List  
 Diagnosis Date Noted  Prediabetes 05/20/2020  Other viral warts 02/27/2020  Obesity (BMI 30-39.9) 11/26/2019  RLS (restless legs syndrome) 09/12/2019  Other chest pain 08/14/2019  Other chronic pain 04/25/2019  Chronic left shoulder pain 04/25/2019  Moderate major depression ([facility] [address] 01/03/2019  Mixed hyperlipidemia 03/20/2018  Vitamin D deficiency 03/20/2018  Uterine leiomyoma 09/12/2017  Depression 06/15/2017  
 HTN (hypertension) 06/15/2017  GRETCHEN (iron deficiency anemia) 06/15/2017 Current Outpatient Medications Medication Sig Dispense Refill  ferrous sulfate (IRON) 325 mg (65 mg iron) EC tablet TAKE 1 TABLET BY MOUTH EVERY DAY BEFORE BREAKFAST 30 Tab 0  
 buPROPion XL (WELLBUTRIN XL) 150 mg tablet Take 1 Tab by mouth every morning. 30 Tab 0  
 ascorbic acid, vitamin C, (VITAMIN C) 250 mg tablet TAKE 1 TABLET BY MOUTH EVERY DAY 30 Tab 0  propranoloL (INDERAL) 40 mg tablet Take 1 tablet by mouth up to three times a day as needed. 90 Tab 5  
 naproxen EC (EC-Naproxen) 500 mg EC tablet naproxen 500 mg tablet Take 1 tablet twice a day by oral route as needed for 60 days.  ergocalciferol (ERGOCALCIFEROL) 1,250 mcg (50,000 unit) capsule Take 1 Cap by mouth every seven (7) days. 8 Cap 0  
 tiZANidine (ZANAFLEX) 4 mg tablet Take 4 mg by mouth two (2) times daily as needed.  gabapentin (NEURONTIN) 300 mg capsule TAKE 1 CAPSULE BY MOUTH AT DINNER TIME AND 1 CAPSULE BY MOUTH AT BEDTIME  0  
 DULoxetine (CYMBALTA) 60 mg capsule duloxetine 60 mg capsule,delayed release TAKE 1 CAPSULE(S) EVERY DAY BY ORAL ROUTE FOR 15 DAYS. Allergies Allergen Reactions  Prednisone Other (comments) Coughs up blood No past medical history on file. Past Surgical History:  
Procedure Laterality Date  HX SHOULDER ARTHROSCOPY Left 2017 and oct 2018 Family History Problem Relation Age of Onset  Diabetes Mother  Heart Attack Mother  Diabetes Father  Lupus Sister  Depression Other  Depression Paternal Uncle Social History Tobacco Use  Smoking status: Never Smoker  Smokeless tobacco: Never Used Substance Use Topics  Alcohol use: No  
  Frequency: Never ROS Objective: No flowsheet data found. [INSTRUCTIONS:  \"[x]\" Indicates a positive item  \"[]\" Indicates a negative item  -- DELETE ALL ITEMS NOT EXAMINED] Constitutional: [x] Appears well-developed and well-nourished [x] No apparent distress   
  [] Abnormal - Mental status: [x] Alert and awake  [x] Oriented to person/place/time [x] Able to follow commands   
[] Abnormal - Eyes:   EOM    [x]  Normal    [] Abnormal -  
Sclera  [x]  Normal    [] Abnormal - 
        Discharge [x]  None visible   [] Abnormal - HENT: [x] Normocephalic, atraumatic  [] Abnormal -  
[x] Mouth/Throat: Mucous membranes are moist 
 
External Ears [x] Normal  [] Abnormal - Neck: [x] No visualized mass [] Abnormal - Pulmonary/Chest: [x] Respiratory effort normal   [x] No visualized signs of difficulty breathing or respiratory distress 
      [] Abnormal - Musculoskeletal:   [x] Normal gait with no signs of ataxia [x] Normal range of motion of neck [] Abnormal -  
 
Neurological:        [x] No Facial Asymmetry (Cranial nerve 7 motor function) (limited exam due to video visit) [x] No gaze palsy  
     [] Abnormal -   
     
Skin:        [x] No significant exanthematous lesions or discoloration noted on facial skin   
     [] Abnormal - Psychiatric:       [x] Normal Affect [] Abnormal -  
     [x] No Hallucinations We discussed the expected course, resolution and complications of the diagnosis(es) in detail. Medication risks, benefits, costs, interactions, and alternatives were discussed as indicated. I advised her to contact the office if her condition worsens, changes or fails to improve as anticipated. She expressed understanding with the diagnosis(es) and plan. David Fuentes, who was evaluated through a patient-initiated, synchronous (real-time) audio-video encounter, and/or her healthcare decision maker, is aware that it is a billable service, with coverage as determined by her insurance carrier. She provided verbal consent to proceed: Yes, and patient identification was verified. It was conducted pursuant to the emergency declaration under the [address] [place] [address] authority and the [employer] and [employer] General Act. A caregiver was present when appropriate. Ability to conduct physical exam was limited. I was at home. The patient was at home.  
 
 
Elbert Tran NP

## 2021-02-02 ENCOUNTER — VIRTUAL VISIT (OUTPATIENT)
Dept: FAMILY MEDICINE CLINIC | Age: 56
End: 2021-02-02
Payer: MEDICAID

## 2021-02-02 DIAGNOSIS — G47.00 INSOMNIA, UNSPECIFIED TYPE: ICD-10-CM

## 2021-02-02 DIAGNOSIS — F41.9 ANXIETY: ICD-10-CM

## 2021-02-02 DIAGNOSIS — F33.2 SEVERE EPISODE OF RECURRENT MAJOR DEPRESSIVE DISORDER, WITHOUT PSYCHOTIC FEATURES (HCC): Primary | ICD-10-CM

## 2021-02-02 PROCEDURE — 99442 PR PHYS/QHP TELEPHONE EVALUATION 11-20 MIN: CPT | Performed by: NURSE PRACTITIONER

## 2021-02-02 RX ORDER — HYDROXYZINE PAMOATE 25 MG/1
25 CAPSULE ORAL
Qty: 30 CAP | Refills: 0 | Status: SHIPPED | OUTPATIENT
Start: 2021-02-02 | End: 2021-02-23

## 2021-02-02 RX ORDER — BUPROPION HYDROCHLORIDE 150 MG/1
150 TABLET ORAL
Qty: 30 TAB | Refills: 0 | Status: SHIPPED | OUTPATIENT
Start: 2021-02-02 | End: 2021-03-16

## 2021-02-02 NOTE — PROGRESS NOTES
Howard Lara presents today for   Chief Complaint   Patient presents with    Follow-up     2 week    Depression     PHQ  [address] preferred language for health care discussion is english/other. Is someone accompanying this pt? NO    Is the patient using any DME equipment during OV? no    Depression Screening:  3 most recent PHQ Screens 2/2/2021   PHQ Not Done -   Little interest or pleasure in doing things More than half the days   Feeling down, depressed, irritable, or hopeless More than half the days   Total Score PHQ 2 4   Trouble falling or staying asleep, or sleeping too much Nearly every day   Feeling tired or having little energy More than half the days   Poor appetite, weight loss, or overeating Nearly every day   Feeling bad about yourself - or that you are a failure or have let yourself or your family down Nearly every day   Trouble concentrating on things such as school, work, reading, or watching TV Nearly every day   Moving or speaking so slowly that other people could have noticed; or the opposite being so fidgety that others notice Nearly every day   Thoughts of being better off dead, or hurting yourself in some way More than half the days   PHQ 9 Score 23   How difficult have these problems made it for you to do your work, take care of your home and get along with others Extremely difficult       Learning Assessment:  Learning Assessment 1/13/2021   PRIMARY LEARNER Patient   HIGHEST LEVEL OF EDUCATION - PRIMARY LEARNER  GRADUATED HIGH SCHOOL OR GED   BARRIERS PRIMARY LEARNER NONE   CO-LEARNER CAREGIVER No   PRIMARY LANGUAGE ENGLISH    NEED No   LEARNER PREFERENCE PRIMARY DEMONSTRATION   ANSWERED BY patient   RELATIONSHIP SELF       Abuse Screening:  Abuse Screening Questionnaire 1/13/2021   Do you ever feel afraid of your partner? N   Are you in a relationship with someone who physically or mentally threatens you? N   Is it safe for you to go home?  Y       Generalized Anxiety  JL 2/7 1/20/2021 1/13/2021   Feeling nervous, anxious or on edge? 3 3   Not being able to stop or control worrying? 3 3   JL-2 Subtotal 6 6   Worrying too much about different things? 3 3   Trouble relaxing? 3 3   Being so restless that it is hard to sit still? 3 3   Becoming easily annoyed or irritable? 3 3   Feeling afraid as if something awful might happen? 3 2   JL-7 Total Score 21 20   If you checked off any problems, how difficult have these problems made it for you to do your work, take care of thinks at home, or get along with other people? Extremely difficult Very difficult         Health Maintenance Due   Topic Date Due    COVID-19 Vaccine (1 of 2) 02/01/1982    DTaP/Tdap/Td series (1 - Tdap) 02/01/1987    Shingrix Vaccine Age 50> (1 of 2) 02/01/2016    Breast Cancer Screen Mammogram  02/01/2016    PAP AKA CERVICAL CYTOLOGY  09/12/2020    A1C test (Diabetic or Prediabetic)  11/26/2020   . Health Maintenance reviewed and discussed and ordered per Provider. Coordination of Care:  1. Have you been to the ER, urgent care clinic since your last visit? Hospitalized since your last visit? no    2. Have you seen or consulted any other health care providers outside of the [address] [place] [address] since your last visit? Include any pap smears or colon screening.  no      Advance Directive:  Discussed 1/20/21

## 2021-02-02 NOTE — PROGRESS NOTES
Eloisa Cintron is a 55 y.o. female, evaluated via audio-only technology on 2/2/2021 for Follow-up (2 week) and Depression (PHQ  23)  Unable to connect via video. Son gave her a zoom birthday gathering. Her sisters, daughter, brother, and grand kids were on the call. Son has been coming to take her out. She said she was going to start walking and planning on doing this today. She made two counseling appointments. She was not able to get on the meditation michael. She is taking her medications. She does have some passive thoughts of not wanting to be around but denies any thoughts of hurting or harming herself or others. She denies having a plan. She reports mild anxiety but more depression. She is sleeping some but would like to get some more sleep in. She is attending [employer] service on line. Assessment & Plan:   Diagnoses and all orders for this visit:    1. Severe episode of recurrent major depressive disorder, without psychotic features ([facility] [address]  -     buPROPion XL (WELLBUTRIN XL) 150 mg tablet; Take 1 Tab by mouth every morning. 2. Anxiety  -     buPROPion XL (WELLBUTRIN XL) 150 mg tablet; Take 1 Tab by mouth every morning.  -     hydrOXYzine pamoate (VISTARIL) 25 mg capsule; Take 1 Cap by mouth three (3) times daily as needed for Anxiety or Sleep for up to 21 days. 3. Insomnia, unspecified type  -     hydrOXYzine pamoate (VISTARIL) 25 mg capsule; Take 1 Cap by mouth three (3) times daily as needed for Anxiety or Sleep for up to 21 days. Continue to take medications as prescribed. Will re-evaluate medications but will given her two more weeks on the Wellbutrin. Will try the vistaril to assist with her insomnia. Medication, side effects, possible allergic reactions and warnings reviewed with patient. Patient verbalized understanding. Patient would still like to have an appointment in 2 weeks. She is setting small goals and I am seeing small accomplishments.   She does have the 1-800 suicide help line if needed. She is to continue to take her medications as prescribed. We are awaiting a counseling appointment. 12  Subjective:       Prior to Admission medications    Medication Sig Start Date End Date Taking? Authorizing Provider   buPROPion XL (WELLBUTRIN XL) 150 mg tablet Take 1 Tab by mouth every morning. 2/2/21  Yes Fatimah ESCUDERO NP   hydrOXYzine pamoate (VISTARIL) 25 mg capsule Take 1 Cap by mouth three (3) times daily as needed for Anxiety or Sleep for up to 21 days. 2/2/21 2/23/21 Yes Fatimah ESCUDERO NP   ferrous sulfate (IRON) 325 mg (65 mg iron) EC tablet TAKE 1 TABLET BY MOUTH EVERY DAY BEFORE BREAKFAST 1/18/21  Yes Fatimah ESCUDERO NP   propranoloL (INDERAL) 40 mg tablet Take 1 tablet by mouth up to three times a day as needed. 8/21/20  Yes Delilah Ricks DO   naproxen EC (EC-Naproxen) 500 mg EC tablet naproxen 500 mg tablet   Take 1 tablet twice a day by oral route as needed for 60 days. Yes Provider, Historical   tiZANidine (ZANAFLEX) 4 mg tablet Take 4 mg by mouth two (2) times daily as needed. Yes Provider, Historical   gabapentin (NEURONTIN) 300 mg capsule TAKE 1 CAPSULE BY MOUTH AT DINNER TIME AND 1 CAPSULE BY MOUTH AT BEDTIME 9/11/19  Yes Provider, Historical   DULoxetine (CYMBALTA) 60 mg capsule duloxetine 60 mg capsule,delayed release   TAKE 1 CAPSULE(S) EVERY DAY BY ORAL ROUTE FOR 15 DAYS. Yes Provider, Historical   buPROPion XL (WELLBUTRIN XL) 150 mg tablet Take 1 Tab by mouth every morning. 1/13/21 2/2/21  Fatimah ESCUDERO NP   ascorbic acid, vitamin C, (VITAMIN C) 250 mg tablet TAKE 1 TABLET BY MOUTH EVERY DAY 11/15/20 2/2/21  Fatimah ESCUDERO NP   ergocalciferol (ERGOCALCIFEROL) 1,250 mcg (50,000 unit) capsule Take 1 Cap by mouth every seven (7) days.  5/20/20 2/2/21  Franki Chaudhry NP     Patient Active Problem List   Diagnosis Code    Moderate major depression ([facility] [address] F32.1    Depression F32.9    HTN (hypertension) I10    Mixed hyperlipidemia E78.2    Uterine leiomyoma D25.9    Vitamin D deficiency E55.9    GRETCHEN (iron deficiency anemia) D50.9    Other chronic pain G89.29    Chronic left shoulder pain M25.512, G89.29    Other chest pain R07.89    RLS (restless legs syndrome) G25.81    Obesity (BMI 30-39. 9) E66.9    Other viral warts B07.8    Prediabetes R73.03     Patient Active Problem List    Diagnosis Date Noted    Prediabetes 05/20/2020    Other viral warts 02/27/2020    Obesity (BMI 30-39.9) 11/26/2019    RLS (restless legs syndrome) 09/12/2019    Other chest pain 08/14/2019    Other chronic pain 04/25/2019    Chronic left shoulder pain 04/25/2019    Moderate major depression ([facility] [address] 01/03/2019    Mixed hyperlipidemia 03/20/2018    Vitamin D deficiency 03/20/2018    Uterine leiomyoma 09/12/2017    Depression 06/15/2017    HTN (hypertension) 06/15/2017    GRETCHEN (iron deficiency anemia) 06/15/2017     Current Outpatient Medications   Medication Sig Dispense Refill    buPROPion XL (WELLBUTRIN XL) 150 mg tablet Take 1 Tab by mouth every morning. 30 Tab 0    hydrOXYzine pamoate (VISTARIL) 25 mg capsule Take 1 Cap by mouth three (3) times daily as needed for Anxiety or Sleep for up to 21 days. 30 Cap 0    ferrous sulfate (IRON) 325 mg (65 mg iron) EC tablet TAKE 1 TABLET BY MOUTH EVERY DAY BEFORE BREAKFAST 30 Tab 0    propranoloL (INDERAL) 40 mg tablet Take 1 tablet by mouth up to three times a day as needed. 90 Tab 5    naproxen EC (EC-Naproxen) 500 mg EC tablet naproxen 500 mg tablet   Take 1 tablet twice a day by oral route as needed for 60 days.  tiZANidine (ZANAFLEX) 4 mg tablet Take 4 mg by mouth two (2) times daily as needed.  gabapentin (NEURONTIN) 300 mg capsule TAKE 1 CAPSULE BY MOUTH AT DINNER TIME AND 1 CAPSULE BY MOUTH AT BEDTIME  0    DULoxetine (CYMBALTA) 60 mg capsule duloxetine 60 mg capsule,delayed release   TAKE 1 CAPSULE(S) EVERY DAY BY ORAL ROUTE FOR 15 DAYS.        Allergies   Allergen Reactions    Prednisone Other (comments)     Coughs up blood       History reviewed. No pertinent past medical history. Past Surgical History:   Procedure Laterality Date    HX SHOULDER ARTHROSCOPY Left 2017 and oct 2018     Family History   Problem Relation Age of Onset    Diabetes Mother     Heart Attack Mother     Diabetes Father     Lupus Sister     Depression Other     Depression Paternal Uncle      Social History     Tobacco Use    Smoking status: Never Smoker    Smokeless tobacco: Never Used   Substance Use Topics    Alcohol use: No     Frequency: Never       Review of Systems   Respiratory: Negative for shortness of breath. Cardiovascular: Negative for chest pain. Psychiatric/Behavioral: Positive for depression. Negative for substance abuse and suicidal ideas. The patient is nervous/anxious and has insomnia. No flowsheet data found. Elif Ramirez, who was evaluated through a patient-initiated, synchronous (real-time) audio only encounter, and/or her healthcare decision maker, is aware that it is a billable service, with coverage as determined by her insurance carrier. She provided verbal consent to proceed: Yes. She has not had a related appointment within my department in the past 7 days or scheduled within the next 24 hours.       Total Time: minutes: 11-20 minutes    Domo Silveira NP

## 2021-02-03 ENCOUNTER — OFFICE VISIT (OUTPATIENT)
Dept: ORTHOPEDIC SURGERY | Age: 56
End: 2021-02-03
Payer: COMMERCIAL

## 2021-02-03 VITALS
RESPIRATION RATE: 24 BRPM | HEIGHT: 64 IN | SYSTOLIC BLOOD PRESSURE: 151 MMHG | HEART RATE: 77 BPM | WEIGHT: 188 LBS | DIASTOLIC BLOOD PRESSURE: 96 MMHG | BODY MASS INDEX: 32.1 KG/M2 | TEMPERATURE: 97.9 F | OXYGEN SATURATION: 97 %

## 2021-02-03 DIAGNOSIS — R20.0 NUMBNESS AND TINGLING OF RIGHT HAND: Primary | ICD-10-CM

## 2021-02-03 DIAGNOSIS — R94.131 ABNORMAL EMG: ICD-10-CM

## 2021-02-03 DIAGNOSIS — R20.2 NUMBNESS AND TINGLING OF RIGHT HAND: Primary | ICD-10-CM

## 2021-02-03 PROCEDURE — 95909 NRV CNDJ TST 5-6 STUDIES: CPT | Performed by: PHYSICAL MEDICINE & REHABILITATION

## 2021-02-03 PROCEDURE — 95886 MUSC TEST DONE W/N TEST COMP: CPT | Performed by: PHYSICAL MEDICINE & REHABILITATION
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
## 2021-02-03 NOTE — PATIENT INSTRUCTIONS
High Blood Pressure: Care Instructions  Overview     It's normal for blood pressure to go up and down throughout the day. But if it stays up, you have high blood pressure. Another name for high blood pressure is hypertension. Despite what a lot of people think, high blood pressure usually doesn't cause headaches or make you feel dizzy or lightheaded. It usually has no symptoms. But it does increase your risk of stroke, heart attack, and other problems. You and your doctor will talk about your risks of these problems based on your blood pressure. Your doctor will give you a goal for your blood pressure. Your goal will be based on your health and your age. Lifestyle changes, such as eating healthy and being active, are always important to help lower blood pressure. You might also take medicine to reach your blood pressure goal.  Follow-up care is a key part of your treatment and safety. Be sure to make and go to all appointments, and call your doctor if you are having problems. It's also a good idea to know your test results and keep a list of the medicines you take. How can you care for yourself at home? Medical treatment  · If you stop taking your medicine, your blood pressure will go back up. You may take one or more types of medicine to lower your blood pressure. Be safe with medicines. Take your medicine exactly as prescribed. Call your doctor if you think you are having a problem with your medicine. · Talk to your doctor before you start taking aspirin every day. Aspirin can help certain people lower their risk of a heart attack or stroke. But taking aspirin isn't right for everyone, because it can cause serious bleeding. · See your doctor regularly. You may need to see the doctor more often at first or until your blood pressure comes down. · If you are taking blood pressure medicine, talk to your doctor before you take decongestants or anti-inflammatory medicine, such as ibuprofen.  Some of these medicines can raise blood pressure. · Learn how to check your blood pressure at home. Lifestyle changes  · Stay at a healthy weight. This is especially important if you put on weight around the waist. Losing even 10 pounds can help you lower your blood pressure. · If your doctor recommends it, get more exercise. Walking is a good choice. Bit by bit, increase the amount you walk every day. Try for at least 30 minutes on most days of the week. You also may want to swim, bike, or do other activities. · Avoid or limit alcohol. Talk to your doctor about whether you can drink any alcohol. · Try to limit how much sodium you eat to less than 2,300 milligrams (mg) a day. Your doctor may ask you to try to eat less than 1,500 mg a day. · Eat plenty of fruits (such as bananas and oranges), vegetables, legumes, whole grains, and low-fat dairy products. · Lower the amount of saturated fat in your diet. Saturated fat is found in animal products such as milk, cheese, and meat. Limiting these foods may help you lose weight and also lower your risk for heart disease. · Do not smoke. Smoking increases your risk for heart attack and stroke. If you need help quitting, talk to your doctor about stop-smoking programs and medicines. These can increase your chances of quitting for good. When should you call for help? Call  911 anytime you think you may need emergency care. This may mean having symptoms that suggest that your blood pressure is causing a serious heart or blood vessel problem. Your blood pressure may be over 180/120. For example, call 911 if:    · You have symptoms of a heart attack. These may include:  ? Chest pain or pressure, or a strange feeling in the chest.  ? Sweating. ? Shortness of breath. ? Nausea or vomiting. ? Pain, pressure, or a strange feeling in the back, neck, jaw, or upper belly or in one or both shoulders or arms. ? Lightheadedness or sudden weakness.   ? A fast or irregular heartbeat.     · You have symptoms of a stroke. These may include:  ? Sudden numbness, tingling, weakness, or loss of movement in your face, arm, or leg, especially on only one side of your body. ? Sudden vision changes. ? Sudden trouble speaking. ? Sudden confusion or trouble understanding simple statements. ? Sudden problems with walking or balance. ? A sudden, severe headache that is different from past headaches.     · You have severe back or belly pain. Do not wait until your blood pressure comes down on its own. Get help right away. Call your doctor now or seek immediate care if:    · Your blood pressure is much higher than normal (such as 180/120 or higher), but you don't have symptoms.     · You think high blood pressure is causing symptoms, such as:  ? Severe headache.  ? Blurry vision. Watch closely for changes in your health, and be sure to contact your doctor if:    · Your blood pressure measures higher than your doctor recommends at least 2 times. That means the top number is higher or the bottom number is higher, or both.     · You think you may be having side effects from your blood pressure medicine. Where can you learn more? Go to http://www.gray.com/  Enter [id number] in the search box to learn more about \"High Blood Pressure: Care Instructions. \"  Current as of: December 16, 2019               Content Version: 12.6  © 3223-1977 [employer]. Care instructions adapted under license by [employer] (which disclaims liability or warranty for this information). If you have questions about a medical condition or this instruction, always ask your healthcare professional. [place] [address] any warranty or liability for your use of this information.

## 2021-02-03 NOTE — PROGRESS NOTES
Sean Yin presents today for   Chief Complaint   Patient presents with    Wrist Pain     right    Hand Pain     fingers on right hand       Is someone accompanying this pt? no    Is the patient using any DME equipment during OV? no    Depression Screening:  3 most recent PHQ Screens 2/2/2021   PHQ Not Done -   Little interest or pleasure in doing things More than half the days   Feeling down, depressed, irritable, or hopeless More than half the days   Total Score PHQ 2 4   Trouble falling or staying asleep, or sleeping too much Nearly every day   Feeling tired or having little energy More than half the days   Poor appetite, weight loss, or overeating Nearly every day   Feeling bad about yourself - or that you are a failure or have let yourself or your family down Nearly every day   Trouble concentrating on things such as school, work, reading, or watching TV Nearly every day   Moving or speaking so slowly that other people could have noticed; or the opposite being so fidgety that others notice Nearly every day   Thoughts of being better off dead, or hurting yourself in some way More than half the days   PHQ 9 Score 23   How difficult have these problems made it for you to do your work, take care of your home and get along with others Extremely difficult       Learning Assessment:  Learning Assessment 1/13/2021   PRIMARY LEARNER Patient   HIGHEST LEVEL OF EDUCATION - PRIMARY LEARNER  GRADUATED HIGH SCHOOL OR GED   BARRIERS PRIMARY LEARNER NONE   CO-LEARNER CAREGIVER No   PRIMARY LANGUAGE ENGLISH    NEED No   LEARNER PREFERENCE PRIMARY DEMONSTRATION   ANSWERED BY patient   RELATIONSHIP SELF       Abuse Screening:  Abuse Screening Questionnaire 1/13/2021   Do you ever feel afraid of your partner? N   Are you in a relationship with someone who physically or mentally threatens you? N   Is it safe for you to go home? Y       Fall Risk  Fall Risk Assessment, last 12 mths 8/21/2020   Able to walk?  Yes Fall in past 12 months? No       Coordination of Care:  1. Have you been to the ER, urgent care clinic since your last visit? no  Hospitalized since your last visit? no    2. Have you seen or consulted any other health care providers outside of the [address] [place] [address] since your last visit? Yes, Dr. Charlene Caicedo and cardiology. Include any pap smears or colon screening.  no

## 2021-02-11 DIAGNOSIS — G56.01 RIGHT CARPAL TUNNEL SYNDROME: ICD-10-CM

## 2021-03-16 DIAGNOSIS — F41.9 ANXIETY: ICD-10-CM

## 2021-03-16 DIAGNOSIS — F33.2 SEVERE EPISODE OF RECURRENT MAJOR DEPRESSIVE DISORDER, WITHOUT PSYCHOTIC FEATURES (HCC): ICD-10-CM

## 2021-03-16 RX ORDER — BUPROPION HYDROCHLORIDE 150 MG/1
TABLET ORAL
Qty: 30 TAB | Refills: 0 | Status: SHIPPED | OUTPATIENT
Start: 2021-03-16 | End: 2022-05-05 | Stop reason: SDDI

## 2021-04-06 ENCOUNTER — PATIENT MESSAGE (OUTPATIENT)
Dept: FAMILY MEDICINE CLINIC | Age: 56
End: 2021-04-06

## 2021-08-16 ENCOUNTER — OFFICE VISIT (OUTPATIENT)
Dept: ORTHOPEDIC SURGERY | Age: 56
End: 2021-08-16
Payer: COMMERCIAL

## 2021-08-16 VITALS
RESPIRATION RATE: 15 BRPM | WEIGHT: 190 LBS | TEMPERATURE: 97 F | HEIGHT: 64 IN | HEART RATE: 74 BPM | OXYGEN SATURATION: 96 % | BODY MASS INDEX: 32.44 KG/M2

## 2021-08-16 DIAGNOSIS — M22.2X1 PATELLOFEMORAL DISORDER, RIGHT: Primary | ICD-10-CM

## 2021-08-16 DIAGNOSIS — M70.51 PES ANSERINUS BURSITIS OF RIGHT KNEE: ICD-10-CM

## 2021-08-16 PROCEDURE — 99215 OFFICE O/P EST HI 40 MIN: CPT | Performed by: FAMILY MEDICINE

## 2021-08-16 RX ORDER — MELOXICAM 15 MG/1
TABLET ORAL
Qty: 90 TABLET | Refills: 0 | Status: SHIPPED | OUTPATIENT
Start: 2021-08-16 | End: 2021-09-30 | Stop reason: SDUPTHER

## 2021-08-16 NOTE — PROGRESS NOTES
HISTORY OF PRESENT ILLNESS    Jenna Flaherty 1965 is a 55y.o. year old female comes in today as new patient for: right knee pain    Patients symptoms have been present for 5-6 months. Pain level 2/10 anterior and posterior. It has worsened with stairs. Patient has tried:  [facility] and given knee brace with benefit. It is described as pain in knee w/o known cause. IMAGING: XR at [facility] \"fine\" [id number] per pt    Past Surgical History:   Procedure Laterality Date    HX SHOULDER ARTHROSCOPY Left 2017 and oct 2018     Social History     Socioeconomic History    Marital status: SINGLE     Spouse name: Not on file    Number of children: Not on file    Years of education: Not on file    Highest education level: Not on file   Tobacco Use    Smoking status: Never Smoker    Smokeless tobacco: Never Used   Substance and Sexual Activity    Alcohol use: No    Drug use: No    Sexual activity: Yes     Partners: Male     Social Determinants of Health     Financial Resource Strain:     Difficulty of Paying Living Expenses:    Food Insecurity:     Worried About Running Out of Food in the Last Year:     [address] in the Last Year:    Transportation Needs:     Lack of Transportation (Medical):      Lack of Transportation (Non-Medical):    Physical Activity:     Days of Exercise per Week:     Minutes of Exercise per Session:    Stress:     Feeling of Stress :    Social Connections:     Frequency of Communication with Friends and Family:     Frequency of Social Gatherings with Friends and Family:     Attends Judaism Services:     Active Member of Clubs or Organizations:     Attends Club or Organization Meetings:     Marital Status:       Current Outpatient Medications   Medication Sig Dispense Refill    buPROPion XL (WELLBUTRIN XL) 150 mg tablet TAKE 1 TABLET BY MOUTH EVERY DAY IN THE MORNING 30 Tab 0    propranoloL (INDERAL) 40 mg tablet Take 1 tablet by mouth up to three times a day as needed. 90 Tab 5    naproxen EC (EC-Naproxen) 500 mg EC tablet naproxen 500 mg tablet   Take 1 tablet twice a day by oral route as needed for 60 days.  tiZANidine (ZANAFLEX) 4 mg tablet Take 4 mg by mouth two (2) times daily as needed.  gabapentin (NEURONTIN) 300 mg capsule TAKE 1 CAPSULE BY MOUTH AT DINNER TIME AND 1 CAPSULE BY MOUTH AT BEDTIME  0    DULoxetine (CYMBALTA) 60 mg capsule duloxetine 60 mg capsule,delayed release   TAKE 1 CAPSULE(S) EVERY DAY BY ORAL ROUTE FOR 15 DAYS.  ferrous sulfate (IRON) 325 mg (65 mg iron) EC tablet TAKE 1 TABLET BY MOUTH EVERY DAY BEFORE BREAKFAST (Patient not taking: Reported on 8/16/2021) 30 Tab 0     History reviewed. No pertinent past medical history. Family History   Problem Relation Age of Onset    Diabetes Mother     Heart Attack Mother     Diabetes Father     Lupus Sister     Depression Other     Depression Paternal Uncle          ROS:  No swell. Objective:  Pulse 74   Temp 97 °F (36.1 °C)   Resp 15   Ht 5' 4\" (1.626 m)   Wt 190 lb (86.2 kg)   SpO2 96%   BMI 32.61 kg/m²   NEURO:  Sensation intact light touch B/L lower extremities. Reflexes +2/4 patellar and Achilles bilaterally. MS:  LE Strength +5/5 bilateral .   right Knee:  1+ Effusion . Lachman negative. Valgus negative. Varus negative. negative joint line tenderness medial.  Leyla negative. Posterior drawer negative. Noble compression test negative. Patellar apprehension negative. Patellar facet positive tender to palpation medial mild crepitance right. Pes anserine positive TTP right          Assessment/Plan:     ICD-10-CM ICD-9-CM    1.  Patellofemoral disorder, right  M22.2X1 719.96 REFERRAL TO PHYSICAL THERAPY      meloxicam (MOBIC) 15 mg tablet      CANCELED: REFERRAL TO PHYSICAL THERAPY   2. Pes anserinus bursitis of right knee  M70.51 726.61 REFERRAL TO PHYSICAL THERAPY      meloxicam (MOBIC) 15 mg tablet       Patient (or guardian if minor) verbalizes understanding of evaluation and plan. Will start PT and HEP w/ mobic and ice and RTC 6 weeks. Total time spent on encounter including chart/imaging/lab review and evaluation/documentation/demo home program/coordination of care/form completion but not including time for any procedures/manipulation 49 minutes.

## 2021-08-16 NOTE — PATIENT INSTRUCTIONS
Take medication as prescribed. Follow up with physical therapy. Return in 6 weeks.   Search YouTube for my channel:    Dr. Idelia Moat Runner's Knee    Pes Anserine/Lucretia

## 2021-08-25 ENCOUNTER — HOSPITAL ENCOUNTER (OUTPATIENT)
Dept: PHYSICAL THERAPY | Age: 56
Discharge: HOME | End: 2021-08-25
Attending: FAMILY MEDICINE
Payer: COMMERCIAL

## 2021-08-25 PROCEDURE — 97161 PT EVAL LOW COMPLEX 20 MIN: CPT

## 2021-08-25 NOTE — PROGRESS NOTES
[facility]  [address]  [place], [address]  [phone] fax    Plan of Care/ Statement of Necessity for Physical Therapy Services    Patient name: Myah Rojas Start of Care: 2021   Referral source: Jeanie YasmeenDO : 1965    Medical Diagnosis: Knee pain, right [M25.561]  Payor: [employer] / Plan: [address] / Product Type: Managed Care Medicaid /  Onset Date:2021    Treatment Diagnosis: Right knee pain   Prior Hospitalization: see medical history Provider#: [id number]   Medications: Verified on Patient summary List    Comorbidities: Left RTC repair, HTN, arthritis, depression   Prior Level of Function: Pt with no reports of knee pain limiting daily activity     The Plan of Care and following information is based on the information from the initial evaluation. Assessment/ key information: The pt is a 56 y/o F presenting with c/o right knee pain after stepping off of a curb in March. She reports instant pain that she thought would subside but just recently sought treatment. She did have x-rays taken and reported unremarkable findings. She is quite limited by pain today in regards to mobility, overall joint end feel empty. She reports pain with squatting, walking and transfers. Limited right LE strength in sitting with difficulty specifically grading due to some break away. Inconclusive finding with Leyla as medial joint pain not increased with testing compared to baseline. Signs and symptoms appear mostly soft tissue related at this time. Pt would benefit from PT to improve pain, ROM and strength.      Evaluation Complexity History HIGH Complexity :3+ comorbidities / personal factors will impact the outcome/ POC ; Examination LOW Complexity : 1-2 Standardized tests and measures addressing body structure, function, activity limitation and / or participation in recreation  ;Presentation LOW Complexity : Stable, uncomplicated  ;Clinical Decision Making MEDIUM Complexity : FOTO score of 26-74  Overall Complexity Rating: LOW   Problem List: pain affecting function, decrease ROM, decrease strength, decrease ADL/ functional abilitiies, decrease activity tolerance, decrease flexibility/ joint mobility and decrease transfer abilities   Treatment Plan may include any combination of the following: Therapeutic exercise, Therapeutic activities, Neuromuscular re-education, Physical agent/modality, Manual therapy, Patient education, Self Care training, Functional mobility training and Stair training  Patient / Family readiness to learn indicated by: asking questions and trying to perform skills  Persons(s) to be included in education: patient (P)  Barriers to Learning/Limitations: None  Patient Goal (s): Improve pain and walking  Patient Self Reported Health Status: fair  Rehabilitation Potential: good    Short Term Goals: To be accomplished in 2 weeks:  1. Pt will demonstrate I and compliance with HEP to maximize therapeutic effect. 2. Pt will demonstrate right TKE to 5 deg for improved gait efficiency. Long Term Goals: To be accomplished in 4 weeks:  1. Pt will demonstrate right knee AROM 0-120 deg for improved ease of ADLs. 2. Pt will demonstrate right knee strength 4+/5 without pain to improve ease of ADLs. 3. Pt will demonstrate 10 squats to 50 deg without increased knee pain to improve ease of ADL performance. 4. Pt will negotiate 4 stairs x 3 with reciprocal pattern void of instability to improve home negotiation. Frequency / Duration: Patient to be seen 2 times per week for 4 weeks.     Patient/ Caregiver education and instruction: Diagnosis, prognosis, self care, activity modification and exercises   [x]  Plan of care has been reviewed with KIKI Gutiérrez DPT CMTPT 8/25/2021 10:58 AM    ________________________________________________________________________    I certify that the above Therapy Services are being furnished while the patient is under my care. I agree with the treatment plan and certify that this therapy is necessary.     [de-identified] Signature:____________Date:_________TIME:________     Coy Bird DO  ** Signature, Date and Time must be completed for valid certification **    Please sign and return to [facility] [address] [place] [address] & [facility]  [address]  [place], [address]  [phone] fax

## 2021-08-25 NOTE — PROGRESS NOTES
PT DAILY TREATMENT NOTE     Patient Name: Isaias Yanes  Date:2021  : 1965  [x]  Patient  Verified  Payor: [employer] / Plan: [address] / Product Type: Managed Care Medicaid /    In time:8:31  Out time:9:07  Total Treatment Time (min): 36  Visit #: 1 of 8    Treatment Area: Knee pain, right [M25.561]    SUBJECTIVE  Pain Level (0-10 scale): 2  Any medication changes, allergies to medications, adverse drug reactions, diagnosis change, or new procedure performed?: [x] No    [] Yes (see summary sheet for update)  Subjective functional status/changes:   [] No changes reported  The pt reports pain with ambulating, squatting and stairs    OBJECTIVE    16 min [x]Eval                  []Re-Eval       10 min Therapeutic Exercise:  [] See flow sheet :   Rationale: increase ROM and increase strength to improve the patients ability to perform daily tasks and self care    10 min Therapeutic Activity:  []  See flow sheet :   Rationale: pt education and self care  to improve the patients ability to self manage symptoms and maximize therapeutic effect      With   [] TE   [] TA   [] neuro   [] other: Patient Education: [x] Review HEP    [] Progressed/Changed HEP based on:   [] positioning   [] body mechanics   [] transfers   [] heat/ice application    [] other:      Other Objective/Functional Measures:      Pain Level (0-10 scale) post treatment: 6    ASSESSMENT/Changes in Function: see POC    Patient will continue to benefit from skilled PT services to modify and progress therapeutic interventions, address functional mobility deficits, address ROM deficits, address strength deficits, analyze and address soft tissue restrictions, analyze and cue movement patterns and analyze and modify body mechanics/ergonomics to attain remaining goals.      [x]  See Plan of Care  []  See progress note/recertification  []  See Discharge Summary         Progress towards goals / Updated goals:  Short Term Goals: To be accomplished in 2 weeks:  1. Pt will demonstrate I and compliance with HEP to maximize therapeutic effect. IE: HEP issued and instructed  2. Pt will demonstrate right TKE to 5 deg for improved gait efficiency. IE: 12 deg  Long Term Goals: To be accomplished in 4 weeks:  1. Pt will demonstrate right knee AROM 0-120 deg for improved ease of ADLs. IE:  deg  2. Pt will demonstrate right knee strength 4+/5 without pain to improve ease of ADLs. IE: 4/5 with pain and breakaway  3. Pt will demonstrate 10 squats to 50 deg without increased knee pain to improve ease of ADL performance. IE:pain with squatting to 45 deg  4. Pt will negotiate 4 stairs x 3 with reciprocal pattern void of instability to improve home negotiation.    IE: pt reports pain with stair negotiation    PLAN  []  Upgrade activities as tolerated     [x]  Continue plan of care  []  Update interventions per flow sheet       []  Discharge due to:_  []  Other:_      Lanie Dennis DPT CMTPT 8/25/2021  11:14 AM    Future Appointments   Date Time Provider Casey Morales   9/27/2021 10:00 AM DO MERARY Truong AMB

## 2021-08-31 ENCOUNTER — HOSPITAL ENCOUNTER (OUTPATIENT)
Dept: PHYSICAL THERAPY | Age: 56
Discharge: HOME | End: 2021-08-31
Attending: FAMILY MEDICINE
Payer: COMMERCIAL

## 2021-08-31 PROCEDURE — 97530 THERAPEUTIC ACTIVITIES: CPT

## 2021-08-31 PROCEDURE — 97112 NEUROMUSCULAR REEDUCATION: CPT

## 2021-08-31 PROCEDURE — 97110 THERAPEUTIC EXERCISES: CPT

## 2021-08-31 NOTE — PROGRESS NOTES
PT DAILY TREATMENT NOTE     Patient Name: Sinan Barrios  Date:2021  : 1965  [x]  Patient  Verified  Payor: [employer] 22 / Plan: [address] / Product Type: Managed Care Medicaid /    In time: 11:09 AM  Out time:11:49 AM  Total Treatment Time (min): 40  Visit #: 2 of 8    Treatment Area: Knee pain, right [M25.561]    SUBJECTIVE  Pain Level (0-10 scale): 4  Any medication changes, allergies to medications, adverse drug reactions, diagnosis change, or new procedure performed?: [x] No    [] Yes (see summary sheet for update)  Subjective functional status/changes:   [] No changes reported  Reports she has been completing HEP, stops when pain increases. OBJECTIVE      24 min Therapeutic Exercise:  [x] See flow sheet :   Rationale: increase ROM and increase strength to improve the patients ability to perform ADLs with greater tolerance. 8 min Therapeutic Activity:  [x]  See flow sheet : Sit to stands, heel/toe raises   Rationale: increase strength, improve coordination and increase proprioception  to improve the patients ability to perform transfers and negotiate stairs with ease. 8 min Neuromuscular Re-education:  [x]  See flow sheet : including gait training, wall squat with SB    Rationale: increase strength, improve coordination and improve balance  to improve the patients ability to ambulate with optimal mechanics for increased functional capcacity. With   [] TE   [] TA   [] neuro   [] other: Patient Education: [x] Review HEP    [] Progressed/Changed HEP based on:   [] positioning   [] body mechanics   [] transfers   [] heat/ice application    [] other:      Other Objective/Functional Measures:   Exercise program initiated per flowsheet. Skilled cueing for knee tracking and attention to knee position in wall squats; mild left shift with eccentric and concentric phases.   Gait characterized by antalgic mechanics on right, foot flat initial contact, and reduced TKE   Pain Level (0-10 scale) post treatment: 2    ASSESSMENT/Changes in Function: Good understanding of hip/knee mechanics in functional positions, able to correct gait mechanics and demonstrate improve TKE in terminal stance with cueing though still with mildly antalgic mechanics. Wall squat activity to improve proprioceptive awareness of hip/knee position and maintaining symmetrical weight bearing in midline. Patient will continue to benefit from skilled PT services to modify and progress therapeutic interventions, address functional mobility deficits, address ROM deficits, analyze and address soft tissue restrictions, analyze and cue movement patterns and analyze and modify body mechanics/ergonomics to attain remaining goals. Progress towards goals / Updated goals:  Short Term Goals: To be accomplished in 2 weeks:  1. Pt will demonstrate I and compliance with HEP to maximize therapeutic effect. IE: HEP issued and instructed   Current: 8/31/2021 - MET - reports completing to tolerance  2. Pt will demonstrate right TKE to 5 deg for improved gait efficiency. IE: 12 deg    Long Term Goals: To be accomplished in 4 weeks:  1. Pt will demonstrate right knee AROM 0-120 deg for improved ease of ADLs. IE:  deg  2. Pt will demonstrate right knee strength 4+/5 without pain to improve ease of ADLs. IE: 4/5 with pain and breakaway  3. Pt will demonstrate 10 squats to 50 deg without increased knee pain to improve ease of ADL performance. IE:pain with squatting to 45 deg  4. Pt will negotiate 4 stairs x 3 with reciprocal pattern void of instability to improve home negotiation.               IE: pt reports pain with stair negotiation       PLAN  [x]  Upgrade activities as tolerated     []  Continue plan of care  []  Update interventions per flow sheet       []  Discharge due to:_  []  Other:_      Margie Robertson PT 8/31/2021  11:14 AM    Future Appointments   Date Time Provider Caesy Morales   9/27/2021 10:00 AM DO MERARY Davis AMB

## 2021-09-02 ENCOUNTER — HOSPITAL ENCOUNTER (OUTPATIENT)
Dept: PHYSICAL THERAPY | Age: 56
Discharge: HOME | End: 2021-09-02
Attending: FAMILY MEDICINE
Payer: COMMERCIAL

## 2021-09-02 PROCEDURE — 97110 THERAPEUTIC EXERCISES: CPT

## 2021-09-02 PROCEDURE — 97530 THERAPEUTIC ACTIVITIES: CPT

## 2021-09-02 PROCEDURE — 97112 NEUROMUSCULAR REEDUCATION: CPT

## 2021-09-02 NOTE — PROGRESS NOTES
PT DAILY TREATMENT NOTE     Patient Name: Mindy Underwood  Date:2021   : 1965  [x]  Patient  Verified         Payor: Ge[employer]anny 22 / Plan: [address] / Product Type: Managed Care Medicaid /    In time:9:19  Out time:9:58  Total Treatment Time (min): 39  Visit #: 3 of 8    Treatment Area: Knee pain, right [M25.561]    SUBJECTIVE  Pain Level (0-10 scale): 4/10  Any medication changes, allergies to medications, adverse drug reactions, diagnosis change, or new procedure performed?: [x] No    [] Yes (see summary sheet for update)  Subjective functional status/changes:   [] No changes reported  \"Pain comes and goes. \"    OBJECTIVE    18 min Therapeutic Exercise:  [x] See flow sheet :   Rationale: increase ROM and increase strength to improve the patients ability to perform ADL's.    8 min Therapeutic Activity:  [x]  See flow sheet : Sit to stands, HR/TR. Rationale: increase strength and increase proprioception  to improve the patients ability to ambulate in the community. 8 min Neuromuscular Re-education:  [x]  See flow sheet : wall sits with swiss ball, quad set, SLR flexion with quad set. Rationale: improve coordination and increase proprioception  to improve the patients ability to perform functional activities. 5 min Manual Therapy:  Right patellar mobs grade III. Knee tib/fem flex/ext mobs grade III. STM Distal ITB. Knee PROM. Pt supine. The manual therapy interventions were performed at a separate and distinct time from the therapeutic activities interventions. Rationale: decrease pain, increase ROM, increase tissue extensibility and decrease trigger points to improve ease of ambulation.     With   [x] TE   [] TA   [] neuro   [] other: Patient Education: [x] Review HEP    [] Progressed/Changed HEP based on:   [] positioning   [] body mechanics   [] transfers   [] heat/ice application    [] other:      Other Objective/Functional Measures: AROM Right TKE 10 degrees. Pain Level (0-10 scale) post treatment: 4/10    ASSESSMENT/Changes in Function: Audible clicking in Right knee with wall squats and sit to stands. Pt fully participated in treatment and is motivated. TKE ROM improved by 2 degrees. Slight left weight shift noted with wall squats, able to correct however felt popping during concentric phase. Continue PT to increase right knee mobility and strength to improve ease of performing functional activities. Patient will continue to benefit from skilled PT services to modify and progress therapeutic interventions, address functional mobility deficits, address ROM deficits, address strength deficits, analyze and address soft tissue restrictions, analyze and cue movement patterns and analyze and modify body mechanics/ergonomics to attain remaining goals. [x]  See Plan of Care  []  See progress note/recertification  []  See Discharge Summary         Progress towards goals / Updated goals:  Short Term Goals: To be accomplished in 2 weeks:  1. Pt will demonstrate I and compliance with HEP to maximize therapeutic effect.              XN: HEP issued and instructed              Current: 8/31/2021 - MET - reports completing to tolerance  2. Pt will demonstrate right TKE to 5 deg for improved gait efficiency.             IE: 12 deg   Current: Progressing, 10 degrees. 9/2/2021     Long Term Goals: To be accomplished in 4 weeks:  1. Pt will demonstrate right knee AROM 0-120 deg for improved ease of ADLs.             IE:  deg  2. Pt will demonstrate right knee strength 4+/5 without pain to improve ease of ADLs.             WZ: 4/5 with pain and breakaway  3. Pt will demonstrate 10 squats to 50 deg without increased knee pain to improve ease of ADL performance.              IE:pain with squatting to 45 deg  4.  Pt will negotiate 4 stairs x 3 with reciprocal pattern void of instability to improve home negotiation.              IE: pt reports pain with stair negotiation       PLAN  []  Upgrade activities as tolerated     [x]  Continue plan of care  []  Update interventions per flow sheet       []  Discharge due to:_  []  Other:_      Xena Mcmullen, KIKI 9/2/2021  9:18 AM    Future Appointments   Date Time Provider Casey Morales   9/8/2021 12:00 PM Kirill Anderson PTA [facility]   9/14/2021  9:45 AM Nataliia Brown, PT [facility]   9/16/2021 11:30 AM Kirill Anderson PTA [facility]   9/21/2021 10:30 AM Gadiel Shelton [facility]   9/23/2021 10:00 AM Kirill Anderson PTA [facility]   9/27/2021 10:00 AM DO MERARY Piper AMB

## 2021-09-08 ENCOUNTER — HOSPITAL ENCOUNTER (OUTPATIENT)
Dept: PHYSICAL THERAPY | Age: 56
Discharge: HOME | End: 2021-09-08
Attending: FAMILY MEDICINE
Payer: COMMERCIAL

## 2021-09-08 PROCEDURE — 97530 THERAPEUTIC ACTIVITIES: CPT

## 2021-09-08 PROCEDURE — 97112 NEUROMUSCULAR REEDUCATION: CPT

## 2021-09-08 PROCEDURE — 97110 THERAPEUTIC EXERCISES: CPT

## 2021-09-08 NOTE — PROGRESS NOTES
PT DAILY TREATMENT NOTE     Patient Name: Nichol Jacob  Date:2021  : 1965  [x]  Patient  Verified  Payor: [employer] / Plan: [address] / Product Type: Managed Care Medicaid /    In time:11:58  Out time:12:47  Total Treatment Time (min): 49  Visit #: 4 of 8    Treatment Area: Knee pain, right [M25.561]    SUBJECTIVE  Pain Level (0-10 scale): 2  Any medication changes, allergies to medications, adverse drug reactions, diagnosis change, or new procedure performed?: [x] No    [] Yes (see summary sheet for update)  Subjective functional status/changes:   [] No changes reported  Pt reports no new changes since last visit. OBJECTIVE    22 min Therapeutic Exercise:  [x] See flow sheet :   Rationale: increase ROM and increase strength to improve the patients ability to perform functional task with ease. 8 min Therapeutic Activity:  [x]  See flow sheet :   Rationale: increase strength and improve coordination  to improve the patients ability to perform daily task with ease. 14 min Neuromuscular Re-education:  [x]  See flow sheet :   Rationale: increase strength, improve coordination, improve balance and increase proprioception  to improve the patients ability to perform ADL's with ease. 5 min Manual Therapy:  Right patellar mobs grade III. Knee tib/fem flex/ext mobs grade III. STM Distal ITB. Knee PROM. Pt supine. The manual therapy interventions were performed at a separate and distinct time from the therapeutic activities interventions. Rationale: decrease pain, increase ROM and increase tissue extensibility to improve functional mobility with ambulation ADL's.           With   [] TE   [] TA   [] neuro   [] other: Patient Education: [x] Review HEP    [] Progressed/Changed HEP based on:   [] positioning   [] body mechanics   [] transfers   [] heat/ice application    [] other:      Other Objective/Functional Measures: right knee AROM 5-108 deg      Pain Level (0-10 scale) post treatment: 2    ASSESSMENT/Changes in Function:   Mild ant right knee discomfort and popping with sit to stands however pt able to perform 12 reps this visit. Mild quad lag noted with SLR with pt challenged to maintain TKE due to quad weakness and fatigue. Pt does display improved AROM of the right knee but continues to note end range pain with a empty end feel. Patient will continue to benefit from skilled PT services to modify and progress therapeutic interventions, address functional mobility deficits, address ROM deficits, address strength deficits, analyze and address soft tissue restrictions, analyze and cue movement patterns, analyze and modify body mechanics/ergonomics and assess and modify postural abnormalities to attain remaining goals. [x]  See Plan of Care  []  See progress note/recertification  []  See Discharge Summary         Progress towards goals / Updated goals:  Short Term Goals: To be accomplished in 2 weeks:  1. Pt will demonstrate I and compliance with HEP to maximize therapeutic effect.              AH: HEP issued and instructed              Current: 8/31/2021 - MET - reports completing to tolerance  2. Pt will demonstrate right TKE to 5 deg for improved gait efficiency.             IE: 12 deg              Current: Progressing, 10 degrees. 9/2/2021     Long Term Goals: To be accomplished in 4 weeks:  1. Pt will demonstrate right knee AROM 0-120 deg for improved ease of ADLs.             BU:  deg   Current: progressing 9/8/21 5-108 deg  2. Pt will demonstrate right knee strength 4+/5 without pain to improve ease of ADLs.             SD: 4/5 with pain and breakaway  3. Pt will demonstrate 10 squats to 50 deg without increased knee pain to improve ease of ADL performance.              IE:pain with squatting to 45 deg  4.  Pt will negotiate 4 stairs x 3 with reciprocal pattern void of instability to improve home negotiation.              IE: pt reports pain with stair negotiation       PLAN  []  Upgrade activities as tolerated     [x]  Continue plan of care  []  Update interventions per flow sheet       []  Discharge due to:_  []  Other:_      Roxann Torres, KIKI 9/8/2021  12:08 PM    Future Appointments   Date Time Provider Casey Morales   9/14/2021  9:45 AM Tonia ESCUDERO, PT [facility]   9/16/2021 11:30 AM [facility]   9/21/2021 10:30 AM Suha Slaughter [facility]   9/23/2021 10:00 AM Sofia Kline PTA [facility]   9/27/2021 10:00 AM DO MERARY Cesar AMB

## 2021-09-14 ENCOUNTER — APPOINTMENT (OUTPATIENT)
Dept: PHYSICAL THERAPY | Age: 56
End: 2021-09-14
Attending: FAMILY MEDICINE
Payer: COMMERCIAL

## 2021-09-16 ENCOUNTER — HOSPITAL ENCOUNTER (OUTPATIENT)
Dept: PHYSICAL THERAPY | Age: 56
Discharge: HOME | End: 2021-09-16
Attending: FAMILY MEDICINE
Payer: COMMERCIAL

## 2021-09-16 PROCEDURE — 97112 NEUROMUSCULAR REEDUCATION: CPT

## 2021-09-16 PROCEDURE — 97530 THERAPEUTIC ACTIVITIES: CPT

## 2021-09-16 PROCEDURE — 97110 THERAPEUTIC EXERCISES: CPT

## 2021-09-16 NOTE — PROGRESS NOTES
PT DAILY TREATMENT NOTE     Patient Name: Leon Samuel  Date:2021  : 1965  [x]  Patient  Verified  Payor: Geovanny 22 / Plan: [address] / Product Type: Managed Care Medicaid /    In time:11:38  Out time:12:21  Total Treatment Time (min): 43  Visit #: 5 of 8    Treatment Area: Knee pain, right [M25.561]    SUBJECTIVE  Pain Level (0-10 scale): 0  Any medication changes, allergies to medications, adverse drug reactions, diagnosis change, or new procedure performed?: [x] No    [] Yes (see summary sheet for update)  Subjective functional status/changes:   [] No changes reported  Pt reports no pain in her right knee coming in today. OBJECTIVE    15 min Therapeutic Exercise:  [x] See flow sheet :   Rationale: increase ROM and increase strength to improve the patients ability to perform functional task with ease. 8 min Therapeutic Activity:  [x]  See flow sheet :   Rationale: increase strength and improve coordination  to improve the patients ability to perform daily task with ease. 14 min Neuromuscular Re-education:  [x]  See flow sheet :   Rationale: increase strength, improve coordination, improve balance and increase proprioception  to improve the patients ability to perform ADL's with ease. 6 min Manual Therapy:    Right patellar mobs grade III. Knee tib/fem flex/ext mobs grade III. STM Distal ITB. Knee PROM. Pt supine. The manual therapy interventions were performed at a separate and distinct time from the therapeutic activities interventions. Rationale: decrease pain, increase ROM and increase tissue extensibility to improve functional mobility with ambulation ADL's.             With   [] TE   [] TA   [] neuro   [] other: Patient Education: [x] Review HEP    [] Progressed/Changed HEP based on:   [] positioning   [] body mechanics   [] transfers   [] heat/ice application    [] other:      Other Objective/Functional Measures:      Pain Level (0-10 scale) post treatment: 0 (achy)    ASSESSMENT/Changes in Function:   No quad lag noted with SLR's this visit with pt able to maintain a quad set with exercise. Pt notes increased right knee discomfort with forward hip flexion during hip x 3 exercise and was cued to maintain TKE throughout the entire exercise with pt reporting decreased discomfort. Pt reports and displays improved stair negotiation with the ability to navigate in a reciprocal pattern and 1 UE assist with minimal pain. continued treatment to strengthen the right knee and aid with ease of ambulation ADL's. Patient will continue to benefit from skilled PT services to modify and progress therapeutic interventions, address functional mobility deficits, address ROM deficits, address strength deficits, analyze and address soft tissue restrictions, analyze and cue movement patterns, analyze and modify body mechanics/ergonomics and assess and modify postural abnormalities to attain remaining goals. [x]  See Plan of Care  []  See progress note/recertification  []  See Discharge Summary         Progress towards goals / Updated goals:  Short Term Goals: To be accomplished in 2 weeks:  1. Pt will demonstrate I and compliance with HEP to maximize therapeutic effect.              YP: HEP issued and instructed              Current: 2021 - MET - reports completing to tolerance  2. Pt will demonstrate right TKE to 5 deg for improved gait efficiency.             IE: 12 deg              TIOIQFO: Progressing, 10 degrees. 2021     Long Term Goals: To be accomplished in 4 weeks:  1. Pt will demonstrate right knee AROM 0-120 deg for improved ease of ADLs.             IR:  deg              Current: progressing 21 5-108 deg  2. Pt will demonstrate right knee strength 4+/5 without pain to improve ease of ADLs.             I/5 with pain and breakaway  3.  Pt will demonstrate 10 squats to 50 deg without increased knee pain to improve ease of ADL performance.              IE:pain with squatting to 45 deg  4.  Pt will negotiate 4 stairs x 3 with reciprocal pattern void of instability to improve home negotiation.              IE: pt reports pain with stair negotiation   Current: progressing 9/16/21 pt reports little to no pain with reciprocal pattern and 1 UE assist       PLAN  []  Upgrade activities as tolerated     [x]  Continue plan of care  []  Update interventions per flow sheet       []  Discharge due to:_  []  Other:_      Steffanie Armas PTA 9/16/2021  11:39 AM    Future Appointments   Date Time Provider Casey Bejaranoi   9/21/2021 10:30 AM [place], [address] [facility]   9/23/2021 10:00 AM Unique Alanis PTA [facility]   9/27/2021 10:00 AM DO MERARY Monroe BS AMB

## 2021-09-17 RX ORDER — PROPRANOLOL HYDROCHLORIDE 40 MG/1
TABLET ORAL
Qty: 90 TABLET | Refills: 3 | Status: SHIPPED | OUTPATIENT
Start: 2021-09-17 | End: 2022-06-10

## 2021-09-21 ENCOUNTER — HOSPITAL ENCOUNTER (OUTPATIENT)
Dept: PHYSICAL THERAPY | Age: 56
Discharge: HOME | End: 2021-09-21
Attending: FAMILY MEDICINE
Payer: COMMERCIAL

## 2021-09-21 PROCEDURE — 97112 NEUROMUSCULAR REEDUCATION: CPT

## 2021-09-21 PROCEDURE — 97110 THERAPEUTIC EXERCISES: CPT

## 2021-09-21 PROCEDURE — 97530 THERAPEUTIC ACTIVITIES: CPT

## 2021-09-21 NOTE — PROGRESS NOTES
PT DAILY TREATMENT NOTE     Patient Name: Brittany Pederson  Date:2021  : 1965  [x]  Patient  Verified  Payor: Geovanny 22 / Plan: [address] / Product Type: Managed Care Medicaid /    In time:10:36  Out time:11:12  Total Treatment Time (min): 36  Visit #: 6 of 8    Treatment Area: Knee pain, right [M25.561]    SUBJECTIVE  Pain Level (0-10 scale): 0  Any medication changes, allergies to medications, adverse drug reactions, diagnosis change, or new procedure performed?: [x] No    [] Yes (see summary sheet for update)  Subjective functional status/changes:   [] No changes reported  The pt reports no pain upon arrival. Reports a few times of pain here and there    OBJECTIVE    Modality rationale: PD to improve the patients ability to    Min Type Additional Details    [] Estim:  []Unatt       []IFC  []Premod                        []Other:  []w/ice   []w/heat  Position:  Location:    [] Estim: []Att    []TENS instruct  []NMES                    []Other:  []w/US   []w/ice   []w/heat  Position:  Location:    []  Traction: [] Cervical       []Lumbar                       [] Prone          []Supine                       []Intermittent   []Continuous Lbs:  [] before manual  [] after manual    []  Ultrasound: []Continuous   [] Pulsed                           []1MHz   []3MHz W/cm2:  Location:    []  Iontophoresis with dexamethasone         Location: [] Take home patch   [] In clinic    []  Ice     []  heat  []  Ice massage  []  Laser   []  Anodyne Position:  Location:    []  Laser with stim  []  Other:  Position:  Location:    []  Vasopneumatic Device    []  Right     []  Left  Pre-treatment girth:  Post-treatment girth:  Measured at (location):  Pressure:       [] lo [] med [] hi   Temperature: [] lo [] med [] hi   [] Skin assessment post-treatment:  []intact []redness- no adverse reaction    []redness - adverse reaction:       20 min Therapeutic Exercise:  [] See flow sheet : Rationale: increase ROM and increase strength to improve the patients ability to perform daily tasks and self care    8 min Therapeutic Activity:  []  See flow sheet :   Rationale: increase strength and improve coordination  to improve the patients ability to perform functional tasks with improved mechanics     8 min Neuromuscular Re-education:  []  See flow sheet :   Rationale: increase strength, improve coordination and increase proprioception  to improve the patients ability to perform ADLs with improved stability         With   [] TE   [] TA   [] neuro   [] other: Patient Education: [x] Review HEP    [] Progressed/Changed HEP based on:   [] positioning   [] body mechanics   [] transfers   [] heat/ice application    [] other:      Other Objective/Functional Measures:      Pain Level (0-10 scale) post treatment: 5    ASSESSMENT/Changes in Function: The pt demonstrates improved knee ROM since SOC and improved knee strength as well. She does still report difficulty with prolonged walking > 30 minutes. She would benefit from 2 week continuance of PT to improve strength and endurance with closed chain activity progressing towards HEP    Patient will continue to benefit from skilled PT services to modify and progress therapeutic interventions, address functional mobility deficits, address ROM deficits, address strength deficits, analyze and address soft tissue restrictions, analyze and cue movement patterns and analyze and modify body mechanics/ergonomics to attain remaining goals. []  See Plan of Care  []  See progress note/recertification  []  See Discharge Summary         Progress towards goals / Updated goals:  Short Term Goals: To be accomplished in 2 weeks:  1. Pt will demonstrate I and compliance with HEP to maximize therapeutic effect.              OM: HEP issued and instructed              Current: 8/31/2021 - MET - reports completing to tolerance  2.  Pt will demonstrate right TKE to 5 deg for improved gait efficiency.             IE: 12 deg              DJNGONL: Progressing, 10 degrees. 9/2/2021     Long Term Goals: To be accomplished in 4 weeks:  1. Pt will demonstrate right knee AROM 0-120 deg for improved ease of ADLs.             ZV:  deg              JJRECDS: progressing 9/8/21 5-108 deg; progressing 1-115 deg 9/21/2021  2. Pt will demonstrate right knee strength 4+/5 without pain to improve ease of ADLs.             HN: 4/5 with pain and breakaway   Current: Met 4+/5 pt reports challenge but no pain 9/21/2021  3. Pt will demonstrate 10 squats to 50 deg without increased knee pain to improve ease of ADL performance.              IE:pain with squatting to 45 deg  4.  Pt will negotiate 4 stairs x 3 with reciprocal pattern void of instability to improve home negotiation.              IE: pt reports pain with stair negotiation              Current: progressing 9/16/21 pt reports little to no pain with reciprocal pattern and 1 UE assist    PLAN  []  Upgrade activities as tolerated     []  Continue plan of care  []  Update interventions per flow sheet       []  Discharge due to:_  []  Other:_      Connye Hammans DPT CMTPT 9/21/2021  10:43 AM    Future Appointments   Date Time Provider Casey Morales   9/23/2021 10:00 AM Gloria Silvestre PTA [facility]   9/27/2021 10:00 AM DO MERARY De La Paz AMB

## 2021-09-23 ENCOUNTER — HOSPITAL ENCOUNTER (OUTPATIENT)
Dept: PHYSICAL THERAPY | Age: 56
Discharge: HOME | End: 2021-09-23
Attending: FAMILY MEDICINE
Payer: COMMERCIAL

## 2021-09-23 PROCEDURE — 97110 THERAPEUTIC EXERCISES: CPT

## 2021-09-23 PROCEDURE — 97530 THERAPEUTIC ACTIVITIES: CPT

## 2021-09-23 PROCEDURE — 97112 NEUROMUSCULAR REEDUCATION: CPT

## 2021-09-23 NOTE — PROGRESS NOTES
[facility]  [address] [facility] [address]  [place], [address]  [phone] fax    Physical Therapy Discharge Summary  Patient name: Sinan Barrios Start of Care: 2021   Referral source: Rashad Rogers DO : 1965   Medical/Treatment Diagnosis: Knee pain, right [M25.561]  Payor: Geovanny 22 / Plan: [address] / Product Type: Managed Care Medicaid /  Onset Date:2021                  Prior Hospitalization: see medical history Provider#: [id number]   Medications: Verified on Patient Summary List    Comorbidities: Left RTC repair, HTN, arthritis, depression  Prior Level of Function: Pt with no reports of knee pain limiting daily activity                   Visits from Start of Care: 7    Missed Visits: 1  Reporting Period : 21 to 2021    Progress towards goals / Updated goals:  Short Term Goals:   1. Pt will demonstrate I and compliance with HEP to maximize therapeutic effect. IE: HEP issued and instructed              DC:  - MET - reports completing to tolerance  2. Pt will demonstrate right TKE to 5 deg for improved gait efficiency. IE: 12 deg              DC: Progressing, 10 degrees. 9     Long Term Goals:  1. Pt will demonstrate right knee AROM 0-120 deg for improved ease of ADLs. IE:  deg              DC: progressing  5-108 deg; progressing 1-115 deg   2. Pt will demonstrate right knee strength 4+/5 without pain to improve ease of ADLs. IE: 4/5 with pain and breakaway              DC: Met 4+/5 pt reports challenge but no pain   3. Pt will demonstrate 10 squats to 50 deg without increased knee pain to improve ease of ADL performance. IE:pain with squatting to 45 deg              DC: met  50 deg with no pain. 4. Pt will negotiate 4 stairs x 3 with reciprocal pattern void of instability to improve home negotiation.               IE: pt reports pain with stair negotiation              DC: progressing pt reports little to no pain with reciprocal pattern and 1 UE assist    ASSESSMENT/RECOMMENDATIONS:  The pt demonstrates improved knee ROM since [facility] and improved knee strength as well. Improved squat mechanics and mobility with pt able to squat about 50 deg with no increasing right knee pain. Mild valgus at the B knees noted with squats however pt able to correct with cueing displaying good carryover. Some continued difficulty with ambulation > 30 mins however pt is confident she can continue to strengthen the right knee and improve stability through her HEP. Pt given an updated HEP and d/c'd to manage self care at home. [x]Discontinue therapy: [x]Patient has reached or is progressing toward set goals      []Patient is non-compliant or has abdicated      []Due to lack of appreciable progress towards set goals    Charli Messer, KIKI 9/23/2021 11:01 AM  Co-Sign: Latonya Al DPT CMTPT    NOTE TO PHYSICIAN:  Please complete the following and fax to: [facility] at [facility] at [phone]  . Retain this original for your records. If you are unable to process this request in   24 hours, please contact our office.      [] I have read the above report and request that my patient continue therapy with the following changes/special instructions:  [x] I have read the above report and request that my patient be discharged from therapy    Physician's Signature:____________Date:_________TIME:________     Tiffanie Chavez DO  ** Signature, Date and Time must be completed for valid certification **

## 2021-09-23 NOTE — PROGRESS NOTES
PT DAILY TREATMENT NOTE     Patient Name: Suhas Leary  Date:2021  : 1965  [x]  Patient  Verified  Payor: Danyellejesus 22 / Plan: [address] / Product Type: Managed Care Medicaid /    In time: 10:07  Out time:10:50  Total Treatment Time (min): 43  Visit #: 7 of 8    Treatment Area: Knee pain, right [M25.561]    SUBJECTIVE  Pain Level (0-10 scale): 0  Any medication changes, allergies to medications, adverse drug reactions, diagnosis change, or new procedure performed?: [x] No    [] Yes (see summary sheet for update)  Subjective functional status/changes:   [] No changes reported  \"I'm sure I can keep doing this on my own\"    OBJECTIVE    22 min Therapeutic Exercise:  [x] See flow sheet :   Rationale: increase ROM and increase strength to improve the patients ability to perform functional task with ease. 8 min Therapeutic Activity:  [x]  See flow sheet :   Rationale: increase strength and improve coordination  to improve the patients ability to perform daily task with ease. 13 min Neuromuscular Re-education:  [x]  See flow sheet :   Rationale: increase strength, improve coordination, improve balance and increase proprioception  to improve the patients ability to perform ADL's with ease. With   [] TE   [] TA   [] neuro   [] other: Patient Education: [x] Review HEP    [] Progressed/Changed HEP based on:   [] positioning   [] body mechanics   [] transfers   [] heat/ice application    [] other:      Other Objective/Functional Measures: Pt d/c to manage self care at home. FOTO score- 77%    Pain Level (0-10 scale) post treatment: 0    ASSESSMENT/Changes in Function:   Improved squat mechanics and mobility with pt able to squat about 50 deg with no increasing right knee pain. Mild valgus at the B knees noted with squats however pt able to correct with cueing displaying good carryover.  TKE's with an org band initiated this visit to continue to improve quad strength with pt noting minor tightness at the distal quad but no pain and able to perform as prescribed. Pt reports significant improvements in the right knee since [facility] and is confident she can continue to strengthen the right knee and improve stability through her HEP. Pt given an updated HEP and d/c'd to manage self care at home. Patient will continue to benefit from skilled PT services to modify and progress therapeutic interventions, address functional mobility deficits, address ROM deficits, address strength deficits, analyze and address soft tissue restrictions, analyze and cue movement patterns, analyze and modify body mechanics/ergonomics and assess and modify postural abnormalities to attain remaining goals. [x]  See Plan of Care  []  See progress note/recertification  []  See Discharge Summary         Progress towards goals / Updated goals:  Short Term Goals: To be accomplished in 2 weeks:  1. Pt will demonstrate I and compliance with HEP to maximize therapeutic effect.              FJ: HEP issued and instructed              Current: 8/31/2021 - MET - reports completing to tolerance  2. Pt will demonstrate right TKE to 5 deg for improved gait efficiency.             IE: 12 deg              PZDUYXN: Progressing, 10 degrees. 9/2/2021     Long Term Goals: To be accomplished in 4 weeks:  1. Pt will demonstrate right knee AROM 0-120 deg for improved ease of ADLs.             WV:  deg              VLIGQOA: progressing 9/8/21 5-108 deg; progressing 1-115 deg 9/21/2021  2. Pt will demonstrate right knee strength 4+/5 without pain to improve ease of ADLs.             KA: 4/5 with pain and breakaway              Current: Met 4+/5 pt reports challenge but no pain 9/21/2021  3. Pt will demonstrate 10 squats to 50 deg without increased knee pain to improve ease of ADL performance.              IE:pain with squatting to 45 deg   Current: met 9/23/21 50 deg with no pain.   4. Pt will negotiate 4 stairs x 3 with reciprocal pattern void of instability to improve home negotiation.              IE: pt reports pain with stair negotiation              Current: progressing 9/16/21 pt reports little to no pain with reciprocal pattern and 1 UE assist    PLAN  []  Upgrade activities as tolerated     []  Continue plan of care  []  Update interventions per flow sheet       [x]  Discharge due to:_met or progressing towards her goals.   []  Other:_      Steffanie Armas, PTA 9/23/2021  10:10 AM    Future Appointments   Date Time Provider Casey Morales   9/27/2021 10:00 AM DO MERARY Monroe BS AMB

## 2021-09-30 ENCOUNTER — OFFICE VISIT (OUTPATIENT)
Dept: ORTHOPEDIC SURGERY | Age: 56
End: 2021-09-30
Payer: COMMERCIAL

## 2021-09-30 VITALS
HEART RATE: 87 BPM | BODY MASS INDEX: 33.26 KG/M2 | WEIGHT: 194.8 LBS | HEIGHT: 64 IN | RESPIRATION RATE: 15 BRPM | OXYGEN SATURATION: 95 %

## 2021-09-30 DIAGNOSIS — M22.2X1 PATELLOFEMORAL DISORDER, RIGHT: ICD-10-CM

## 2021-09-30 DIAGNOSIS — M76.31 ILIOTIBIAL BAND SYNDROME AFFECTING RIGHT LOWER LEG: Primary | ICD-10-CM

## 2021-09-30 DIAGNOSIS — M70.51 PES ANSERINUS BURSITIS OF RIGHT KNEE: ICD-10-CM

## 2021-09-30 PROCEDURE — 99213 OFFICE O/P EST LOW 20 MIN: CPT | Performed by: FAMILY MEDICINE

## 2021-09-30 RX ORDER — MELOXICAM 15 MG/1
TABLET ORAL
Qty: 90 TABLET | Refills: 0 | Status: SHIPPED | OUTPATIENT
Start: 2021-09-30 | End: 2022-07-08

## 2021-09-30 RX ORDER — DICLOFENAC SODIUM 10 MG/G
2 GEL TOPICAL
Qty: 200 G | Refills: 1 | Status: SHIPPED | OUTPATIENT
Start: 2021-09-30 | End: 2022-07-08 | Stop reason: SDUPTHER

## 2021-09-30 NOTE — PROGRESS NOTES
HISTORY OF PRESENT ILLNESS    Ryan Flanagan 1965 is a 55y.o. year old female comes in today to be evaluated and treated for: right knee pain    Since last appt has noticed improvement after finish PT last week. Pain level 2/10. Using HEP and mobic with benefit. IMAGING: XR at [facility] \"fine\" [id number] per pt    Past Surgical History:   Procedure Laterality Date    HX SHOULDER ARTHROSCOPY Left 2017 and oct 2018     Social History     Socioeconomic History    Marital status: SINGLE     Spouse name: Not on file    Number of children: Not on file    Years of education: Not on file    Highest education level: Not on file   Tobacco Use    Smoking status: Never Smoker    Smokeless tobacco: Never Used   Substance and Sexual Activity    Alcohol use: No    Drug use: No    Sexual activity: Yes     Partners: Male     Social Determinants of Health     Financial Resource Strain:     Difficulty of Paying Living Expenses:    Food Insecurity:     Worried About Running Out of Food in the Last Year:     [address] in the Last Year:    Transportation Needs:     Lack of Transportation (Medical):  Lack of Transportation (Non-Medical):    Physical Activity:     Days of Exercise per Week:     Minutes of Exercise per Session:    Stress:     Feeling of Stress :    Social Connections:     Frequency of Communication with Friends and Family:     Frequency of Social Gatherings with Friends and Family:     Attends [employer] Services:     Active Member of Clubs or Organizations:     Attends Club or Organization Meetings:     Marital Status:      Current Outpatient Medications   Medication Sig Dispense Refill    propranoloL (INDERAL) 40 mg tablet TAKE 1 TABLET BY MOUTH UP TO THREE TIMES A DAY AS NEEDED. 90 Tablet 3    meloxicam (MOBIC) 15 mg tablet Take 1 tab daily as needed pain with food.  90 Tablet 0    buPROPion XL (WELLBUTRIN XL) 150 mg tablet TAKE 1 TABLET BY MOUTH EVERY DAY IN THE MORNING 30 Tab 0    naproxen EC (EC-Naproxen) 500 mg EC tablet naproxen 500 mg tablet   Take 1 tablet twice a day by oral route as needed for 60 days.  tiZANidine (ZANAFLEX) 4 mg tablet Take 4 mg by mouth two (2) times daily as needed.  gabapentin (NEURONTIN) 300 mg capsule TAKE 1 CAPSULE BY MOUTH AT DINNER TIME AND 1 CAPSULE BY MOUTH AT BEDTIME  0    DULoxetine (CYMBALTA) 60 mg capsule duloxetine 60 mg capsule,delayed release   TAKE 1 CAPSULE(S) EVERY DAY BY ORAL ROUTE FOR 15 DAYS. History reviewed. No pertinent past medical history. Family History   Problem Relation Age of Onset    Diabetes Mother     Heart Attack Mother     Diabetes Father     Lupus Sister     Depression Other     Depression Paternal Uncle          ROS:  Some swell, popping, no numb    Objective:  Pulse 87   Resp 15   Ht 5' 4\" (1.626 m)   Wt 194 lb 12.8 oz (88.4 kg)   SpO2 95%   BMI 33.44 kg/m²   NEURO:  Sensation intact light touch B/L lower extremities. Reflexes +2/4 patellar and Achilles bilaterally. MS:  LE Strength +5/5 bilateral .   right Knee:  1+ Effusion . Lachman negative. Valgus negative. Varus negative. negative joint line tenderness medial.  Leyla negative. Posterior drawer negative. Crain compression test positive. Patellar apprehension negative. Patellar facet positive tender to palpation medial mild crepitance right. Pes anserine positive TTP right    Assessment/Plan:     ICD-10-CM ICD-9-CM    1. Iliotibial band syndrome affecting right lower leg  M76.31 728.89 meloxicam (MOBIC) 15 mg tablet      diclofenac (VOLTAREN) 1 % gel   2. Patellofemoral disorder, right  M22.2X1 719.96 meloxicam (MOBIC) 15 mg tablet      diclofenac (VOLTAREN) 1 % gel   3. Pes anserinus bursitis of right knee  M70.51 726.61 meloxicam (MOBIC) 15 mg tablet      diclofenac (VOLTAREN) 1 % gel       Patient (or guardian if minor) verbalizes understanding of evaluation and plan.     Will continue mobic and HEP after PT complete and demo HEP w/ voltaren gel for distal IT band and plan follow-up as needed.

## 2021-09-30 NOTE — PATIENT INSTRUCTIONS
Take medication as prescribed. Follow up if symptoms worsen or fail to improve.   Search YouTube for my channel:    Dr. Harlen Pal Runner's Knee    IT Band    Pes Anserine/Lucretia

## 2021-11-03 NOTE — PROGRESS NOTES
Clinic Care Coordination Contact  [facility]/Voicemail    Referral Source: IP Handoff  Clinical Data: Care Coordinator Outreach  Outreach attempted x 1. Unable to leave voicemail.   Plan: Care Coordinator will try to reach patient again in 3-5 business days.    Jolanta Velazquez, RN Care Coordinator     [facility] Ambulatory Care Management  [facility]           PT DAILY TREATMENT NOTE    Patient Name: Marisa Barcenas  Date:2019  : 1965  [x]  Patient  Verified  Payor: [facility] MEDICAID / Plan: Zeke [id number] / Product Type: Managed Care Medicaid /    In time:1110  Out time:1150  Total Treatment Time (min): 40  Total Timed Codes (min): 40  1:1 Treatment Time (MC only): 40   Visit #: 4 of 12    Treatment Area: Left shoulder pain [M25.512]    SUBJECTIVE  Pain Level (0-10 scale): 8  Any medication changes, allergies to medications, adverse drug reactions, diagnosis change, or new procedure performed?: [x] No    [] Yes (see summary sheet for update)  Subjective functional status/changes:   [] No changes reported  \"I am looking forward to trying aquatic therapy to see if it will help. \"    OBJECTIVE    40 min Therapeutic Exercise:  [x] See flow sheet :   Rationale: increase ROM, increase strength and decrease pain to improve the patients ability to complete ADLs     With   [] TE   [] TA   [] neuro   [] other: Patient Education: [x] Review HEP    [] Progressed/Changed HEP based on:   [] positioning   [] body mechanics   [] transfers   [] heat/ice application    [] other:      Other Objective/Functional Measures: NA     Pain Level (0-10 scale) post treatment: 8    ASSESSMENT/Changes in Function: Patient with fair tolerance for aquatic exercises. Better tolerance for exercises in warmer hot tub than in cooler pool. Will continue with additional trial in hot tub focusing on improving patient tolerance for active motions left UE. No adverse effects were noted from today's treatment session.      Patient will continue to benefit from skilled PT services to modify and progress therapeutic interventions, address functional mobility deficits, address ROM deficits, address strength deficits, analyze and address soft tissue restrictions, analyze and cue movement patterns, analyze and modify body mechanics/ergonomics, assess and modify postural abnormalities,  and instruct in home and community integration to attain remaining goals. []  See Plan of Care  []  See progress note/recertification  []  See Discharge Summary         Progress towards goals / Updated goals:  Short Term Goals: To be accomplished in 4 weeks:                   Patient will report compliance with HEP at least 1x/day to aid in rehabilitation program.                   Status at IE: Reviewed patient current HEP and provided recommendations for modifications.                   Current: Patient states she is working on HEP frequently at home. 9/24/19                      Patient will display increase in left shoulder flexion and abduction AROM to 75 degrees  to aid in completion of ADLs.                  Status at IE: flexion 45, abduction 21                   Current: Same as IE        Long Term Goals: To be accomplished in 12 weeks:                   Patient will report compliance with HEP a least 3-4x/week to aid in rehabilitation/strengthening program.                   Status at IE: Reviewed patient current HEP and provided recommendations for modifications.                   Current: Same as IE                      Patient will report no pain greater than 3-4/10 with overhead activities to aid in completion of ADLs.                  Status at IE: pain left shoulder 5-10/10.                   Current: Same as IE                      Patient will increase left shoulder flexion and abduction strength to 3+/5  to aid in completion of ADLs.                  Status at IE: left shoulder abduction and flexion 1/5                   Current: Same as IE                      Patient will increase FOTO score to 53 points overall to demonstrate improvement in functional status.                     Status at IE: FOTO 36                   Current: Same as IE       PLAN  []  Upgrade activities as tolerated     [x]  Continue plan of care  []  Update interventions per flow sheet       []  Discharge due to:_  []  Other:_ Neda Lee, PT, DPT 9/27/2019  2:20 PM    Future Appointments   Date Time Provider Casey Morales   10/1/2019  9:00 AM Angelina Alexandra, PT [facility]   10/4/2019 10:30 AM Cesar Gibson, PT [facility]   10/8/2019  9:30 AM Cesar Gibson, PT [facility]   10/15/2019  9:30 AM Cesar Gibson, PT [facility]   10/18/2019  9:00 AM Cesar Gibson, PT [facility]   10/22/2019  9:00 AM Cesar Gibson, PT [facility]   10/25/2019  9:00 AM Cesar Gibson, PT [facility]   10/29/2019  9:00 AM Cesar Gibson, PT [facility]   11/13/2019 11:00 AM Jeanna Aranda NP [facility] None

## 2022-03-18 PROBLEM — F32.A DEPRESSION: Status: ACTIVE | Noted: 2017-06-15

## 2022-03-18 PROBLEM — E55.9 VITAMIN D DEFICIENCY: Status: ACTIVE | Noted: 2018-03-20

## 2022-03-18 PROBLEM — D50.9 IDA (IRON DEFICIENCY ANEMIA): Status: ACTIVE | Noted: 2017-06-15

## 2022-03-19 PROBLEM — B07.8 OTHER VIRAL WARTS: Status: ACTIVE | Noted: 2020-02-27

## 2022-03-19 PROBLEM — I10 HTN (HYPERTENSION): Status: ACTIVE | Noted: 2017-06-15

## 2022-03-19 PROBLEM — M25.512 CHRONIC LEFT SHOULDER PAIN: Status: ACTIVE | Noted: 2019-04-25

## 2022-03-19 PROBLEM — E66.9 OBESITY (BMI 30-39.9): Status: ACTIVE | Noted: 2019-11-26

## 2022-03-19 PROBLEM — R07.89 OTHER CHEST PAIN: Status: ACTIVE | Noted: 2019-08-14

## 2022-03-19 PROBLEM — D25.9 UTERINE LEIOMYOMA: Status: ACTIVE | Noted: 2017-09-12

## 2022-03-19 PROBLEM — G89.29 CHRONIC LEFT SHOULDER PAIN: Status: ACTIVE | Noted: 2019-04-25

## 2022-03-19 PROBLEM — G25.81 RLS (RESTLESS LEGS SYNDROME): Status: ACTIVE | Noted: 2019-09-12

## 2022-03-19 PROBLEM — E78.2 MIXED HYPERLIPIDEMIA: Status: ACTIVE | Noted: 2018-03-20

## 2022-03-20 PROBLEM — G89.29 OTHER CHRONIC PAIN: Status: ACTIVE | Noted: 2019-04-25

## 2022-03-20 PROBLEM — F32.1 MODERATE MAJOR DEPRESSION (HCC): Status: ACTIVE | Noted: 2019-01-03

## 2022-03-20 PROBLEM — R73.03 PREDIABETES: Status: ACTIVE | Noted: 2020-05-20

## 2022-04-12 ENCOUNTER — TELEPHONE (OUTPATIENT)
Dept: FAMILY MEDICINE CLINIC | Age: 57
End: 2022-04-12

## 2022-04-12 NOTE — TELEPHONE ENCOUNTER
ECC transferred pt to me to get her scheduled for an ER follow up. Patient went due to high BP. Tried to get her scheduled but could only do what Dr. Rosita Kim had available which was 5/5. Janett Simpson patient was fine with that but would like a call back in case you'd recommend her do anything else until her appointment as she says she is still having high bp.

## 2022-04-19 ENCOUNTER — TELEPHONE (OUTPATIENT)
Dept: FAMILY MEDICINE CLINIC | Age: 57
End: 2022-04-19

## 2022-04-19 NOTE — TELEPHONE ENCOUNTER
LVM for patient to call back office . Need to know how current bps are   Patient should also call cardiology .

## 2022-04-19 NOTE — TELEPHONE ENCOUNTER
Spoke with patient states that she is not feeling well and feels like she did when her HGB was low .  Patient has made the decision to go to ED , will follow up on discharge

## 2022-05-05 ENCOUNTER — OFFICE VISIT (OUTPATIENT)
Dept: FAMILY MEDICINE CLINIC | Age: 57
End: 2022-05-05
Payer: COMMERCIAL

## 2022-05-05 VITALS
WEIGHT: 195.2 LBS | RESPIRATION RATE: 16 BRPM | DIASTOLIC BLOOD PRESSURE: 94 MMHG | BODY MASS INDEX: 33.32 KG/M2 | HEIGHT: 64 IN | OXYGEN SATURATION: 98 % | TEMPERATURE: 98.2 F | SYSTOLIC BLOOD PRESSURE: 151 MMHG | HEART RATE: 79 BPM

## 2022-05-05 DIAGNOSIS — R07.9 CHEST PAIN, UNSPECIFIED TYPE: ICD-10-CM

## 2022-05-05 DIAGNOSIS — G89.29 CHRONIC SHOULDER PAIN, UNSPECIFIED LATERALITY: ICD-10-CM

## 2022-05-05 DIAGNOSIS — M25.519 CHRONIC SHOULDER PAIN, UNSPECIFIED LATERALITY: ICD-10-CM

## 2022-05-05 DIAGNOSIS — F32.A DEPRESSION, UNSPECIFIED DEPRESSION TYPE: Primary | ICD-10-CM

## 2022-05-05 DIAGNOSIS — F43.21 GRIEF: ICD-10-CM

## 2022-05-05 DIAGNOSIS — F41.9 ANXIETY: ICD-10-CM

## 2022-05-05 DIAGNOSIS — I10 HYPERTENSION, UNSPECIFIED TYPE: ICD-10-CM

## 2022-05-05 DIAGNOSIS — Z13.31 POSITIVE DEPRESSION SCREENING: ICD-10-CM

## 2022-05-05 PROCEDURE — 99215 OFFICE O/P EST HI 40 MIN: CPT | Performed by: FAMILY MEDICINE

## 2022-05-05 RX ORDER — FLUOXETINE HYDROCHLORIDE 20 MG/1
20 CAPSULE ORAL DAILY
Qty: 30 CAPSULE | Refills: 5 | Status: SHIPPED | OUTPATIENT
Start: 2022-05-05

## 2022-05-05 RX ORDER — LABETALOL 100 MG/1
100 TABLET, FILM COATED ORAL EVERY 12 HOURS
COMMUNITY
Start: 2022-04-11 | End: 2022-06-06 | Stop reason: SDUPTHER

## 2022-05-05 RX ORDER — HYDROXYZINE HYDROCHLORIDE 10 MG/1
10 TABLET, FILM COATED ORAL
Qty: 60 TABLET | Refills: 1 | Status: SHIPPED | OUTPATIENT
Start: 2022-05-05 | End: 2022-05-15

## 2022-05-05 RX ORDER — AMLODIPINE BESYLATE 5 MG/1
5 TABLET ORAL DAILY
COMMUNITY
Start: 2022-04-11 | End: 2022-06-06 | Stop reason: SDUPTHER

## 2022-05-05 NOTE — PROGRESS NOTES
Cherri Munoz presents today for   Chief Complaint   Patient presents with   [facility] Follow Up     seen in [facility] ER on 4/11/22, dx with acute chest pain and poorly controlled HTN    Other     PHQ    Mehran preferred language for health care discussion is english/other. Is someone accompanying this pt? no    Is the patient using any DME equipment during [address]? no    Depression Screening:  3 most recent PHQ Screens 5/5/2022   PHQ Not Done -   Little interest or pleasure in doing things More than half the days   Feeling down, depressed, irritable, or hopeless More than half the days   Total Score PHQ 2 4   Trouble falling or staying asleep, or sleeping too much More than half the days   Feeling tired or having little energy Several days   Poor appetite, weight loss, or overeating Nearly every day   Feeling bad about yourself - or that you are a failure or have let yourself or your family down Several days   Trouble concentrating on things such as school, work, reading, or watching TV Several days   Moving or speaking so slowly that other people could have noticed; or the opposite being so fidgety that others notice Several days   Thoughts of being better off dead, or hurting yourself in some way Several days   PHQ 9 Score 14   How difficult have these problems made it for you to do your work, take care of your home and get along with others Very difficult       Learning Assessment:  Learning Assessment 5/5/2022   PRIMARY LEARNER Patient   HIGHEST LEVEL OF EDUCATION - PRIMARY LEARNER  GRADUATED HIGH SCHOOL OR GED   BARRIERS PRIMARY LEARNER NONE   CO-LEARNER CAREGIVER No   PRIMARY LANGUAGE ENGLISH    NEED No   LEARNER PREFERENCE PRIMARY DEMONSTRATION   ANSWERED BY patient   RELATIONSHIP SELF       Abuse Screening:  Abuse Screening Questionnaire 5/5/2022   Do you ever feel afraid of your partner? N   Are you in a relationship with someone who physically or mentally threatens you?  Florencio Perea Is it safe for you to go home? Y       Generalized Anxiety  JL 2/7 1/20/2021 1/13/2021   Feeling nervous, anxious or on edge? 3 3   Not being able to stop or control worrying? 3 3   JL-2 Subtotal 6 6   Worrying too much about different things? 3 3   Trouble relaxing? 3 3   Being so restless that it is hard to sit still? 3 3   Becoming easily annoyed or irritable? 3 3   Feeling afraid as if something awful might happen? 3 2   JL-7 Total Score 21 20   If you checked off any problems, how difficult have these problems made it for you to do your work, take care of thinks at home, or get along with other people? Extremely difficult Very difficult         Health Maintenance Due   Topic Date Due    Hepatitis C Screening  Never done    COVID-19 Vaccine (1) Never done    DTaP/Tdap/Td series (1 - Tdap) Never done    Shingrix Vaccine Age 50> (1 of 2) Never done    Breast Cancer Screen Mammogram  Never done    Cervical cancer screen  09/12/2020    A1C test (Diabetic or Prediabetic)  11/26/2020    Depression Monitoring  02/02/2022   . Health Maintenance reviewed and discussed and ordered per Provider. Coordination of Care:  1. Have you been to the ER, urgent care clinic since your last visit? Hospitalized since your last visit? Yes, [facility] ER    2. Have you seen or consulted any other health care providers outside of the [address] [place] [address] since your last visit? Include any pap smears or colon screening. no      Advance Directive:  1. Do you have an advance directive in place?  Patient Reply:no

## 2022-05-05 NOTE — PROGRESS NOTES
Chief Complaint   Patient presents with   [facility] Follow Up     seen in [facility] ER on 4/11/22, dx with acute chest pain and poorly controlled HTN    Other     PHQ    14     Subjective  Evelyn Islas is a 65 y.o. female. HPI   Pt here for f/u of ER visits for chest pain on 4/11/22 and 4/20/22. Evaluation was neg each time. Pt has a f/u appt with cards. She was advised to resume her meds as rx'ed. Patient does still have some pain. She is unclear if it is associated with food intake or anxiety. The pain is under the upper part of her sternum. Pt reports that her mood is not great. She has low energy and low motivation to do things. She feels her poor mood is related to her chronic shoulder pain as well as prolonged grieving for the death of her father. She feels that her siblings have managed to move on but she is just having great difficulty with this as she and her dad were very close. Review of Systems   Constitutional: Negative. HENT: Negative. Respiratory: Negative. Cardiovascular: Positive for chest pain. Musculoskeletal: Positive for joint pain. Psychiatric/Behavioral: Positive for depression. Negative for suicidal ideas. All other systems reviewed and are negative. Objective  Physical Exam  Vitals and nursing note reviewed. Constitutional:       Appearance: Normal appearance. She is not ill-appearing. HENT:      Head: Normocephalic and atraumatic. Right Ear: External ear normal.      Left Ear: External ear normal.      Nose: Nose normal.      Mouth/Throat:      Mouth: Mucous membranes are moist.   Eyes:      Extraocular Movements: Extraocular movements intact. Conjunctiva/sclera: Conjunctivae normal.   Cardiovascular:      Rate and Rhythm: Normal rate and regular rhythm. Heart sounds: No murmur heard. No friction rub. No gallop. Pulmonary:      Effort: Pulmonary effort is normal.      Breath sounds: Normal breath sounds.  No wheezing, rhonchi or rales. Musculoskeletal:         General: Normal range of motion. Cervical back: Normal range of motion. Skin:     General: Skin is warm and dry. Neurological:      Mental Status: She is alert and oriented to person, place, and time. Coordination: Coordination normal.   Psychiatric:         Attention and Perception: Attention normal.         Mood and Affect: Mood is depressed. Affect is tearful. Speech: Speech normal.         Behavior: Behavior normal. Behavior is cooperative. Thought Content: Thought content normal.         Cognition and Memory: Cognition normal.         Judgment: Judgment normal.          Assessment & Plan  Diagnoses and all orders for this visit:    1. Depression, unspecified depression type  2. Positive depression screening  -     FLUoxetine (PROzac) 20 mg capsule; Take 1 Capsule by mouth daily. Lengthy discussion regarding treatment options and rationale behind recommendations. Patient is eager to feel better and would like to proceed. Discussed anticipated need to titrate the dose of her Prozac. Patient understands and agrees with treatment plan. 3. Anxiety  -    Trial: hydrOXYzine HCL (ATARAX) 10 mg tablet; Take 1 Tablet by mouth two (2) times daily as needed for Anxiety for up to 10 days. 4. Grief  Strongly recommend grief counseling. 5. Chronic shoulder pain, unspecified laterality  Patient is new to me today. Etiology of shoulder pain unclear at this time. We will explore this in greater detail at a future appointment. 6. Chest pain, unspecified type  Etiology unclear. Doubt cardiac etiology but appreciate that cardiac evaluation will occur soon. Suspect possible GI versus anxiety related etiology. 7.  Hypertension, unspecified type  Patient has resumed or will soon resume her blood pressure medications as prescribed.   Patient encouraged to continue taking blood pressure medications routinely and to definitely take her meds prior to her cardiology appointment. Patient does understand the importance of controlling her blood pressure. Over 45 minutes spent with this established patient on the day of service.   (Patient is established with the practice but new to this provider today.)    Wenceslao Reyes MD

## 2022-06-06 NOTE — TELEPHONE ENCOUNTER
Patient need a medication refill. Please advise     Requested Prescriptions     Pending Prescriptions Disp Refills    amLODIPine (NORVASC) 5 mg tablet       Sig: Take 1 Tablet by mouth daily.  labetaloL (NORMODYNE) 100 mg tablet       Sig: Take 1 Tablet by mouth every twelve (12) hours.

## 2022-06-10 RX ORDER — LABETALOL 100 MG/1
100 TABLET, FILM COATED ORAL EVERY 12 HOURS
Qty: 60 TABLET | Refills: 5 | Status: SHIPPED | OUTPATIENT
Start: 2022-06-10

## 2022-06-10 RX ORDER — AMLODIPINE BESYLATE 5 MG/1
5 TABLET ORAL DAILY
Qty: 30 TABLET | Refills: 5 | Status: SHIPPED | OUTPATIENT
Start: 2022-06-10

## 2022-07-08 ENCOUNTER — OFFICE VISIT (OUTPATIENT)
Dept: ORTHOPEDIC SURGERY | Age: 57
End: 2022-07-08
Payer: COMMERCIAL

## 2022-07-08 VITALS
SYSTOLIC BLOOD PRESSURE: 143 MMHG | WEIGHT: 187 LBS | BODY MASS INDEX: 31.92 KG/M2 | OXYGEN SATURATION: 96 % | HEART RATE: 96 BPM | DIASTOLIC BLOOD PRESSURE: 93 MMHG | HEIGHT: 64 IN | TEMPERATURE: 97.6 F

## 2022-07-08 DIAGNOSIS — M70.51 PES ANSERINUS BURSITIS OF RIGHT KNEE: ICD-10-CM

## 2022-07-08 DIAGNOSIS — M22.2X1 PATELLOFEMORAL DISORDER, RIGHT: ICD-10-CM

## 2022-07-08 DIAGNOSIS — M76.31 ILIOTIBIAL BAND SYNDROME AFFECTING RIGHT LOWER LEG: ICD-10-CM

## 2022-07-08 PROCEDURE — 99213 OFFICE O/P EST LOW 20 MIN: CPT | Performed by: FAMILY MEDICINE

## 2022-07-08 RX ORDER — HYDROXYZINE PAMOATE 25 MG/1
CAPSULE ORAL
COMMUNITY

## 2022-07-08 RX ORDER — DICLOFENAC SODIUM 10 MG/G
2 GEL TOPICAL
Qty: 400 G | Refills: 1 | Status: SHIPPED | OUTPATIENT
Start: 2022-07-08

## 2022-07-08 NOTE — LETTER
7/8/2022    Patient: Federica Yin   YOB: 1965   Date of Visit: 7/8/2022     Arnaldo Bosch MD  [id number]  [phone]  [facility]    Dear Arnaldo Bosch MD,      Thank you for referring Ms. Jeannette Koyanagi [place] [facility] [address]. for evaluation. My notes for this consultation are attached. If you have questions, please do not hesitate to call me. I look forward to following your patient along with you.       Sincerely,    Jovan Gonzalez, DO

## 2022-07-08 NOTE — PROGRESS NOTES
HISTORY OF PRESENT ILLNESS    Eloisa Cintron 1965 is a 65y.o. year old female comes in today to be evaluated and treated for: right knee pain    Since last appt has noticed pain for years but bad the last wek or so. Pain level 0 - No pain/10. Using voltaren gel with benefit and wants refill. Is doing HEP. Past Surgical History:   Procedure Laterality Date    HX SHOULDER ARTHROSCOPY Left 2017 and oct 2018     Social History     Socioeconomic History    Marital status: SINGLE   Tobacco Use    Smoking status: Never Smoker    Smokeless tobacco: Never Used   Vaping Use    Vaping Use: Never used   Substance and Sexual Activity    Alcohol use: Yes     Comment: Occ    Drug use: No    Sexual activity: Yes     Partners: Male     Current Outpatient Medications   Medication Sig Dispense Refill    hydrOXYzine pamoate (VISTARIL) 25 mg capsule hydroxyzine pamoate 25 mg capsule   TAKE 1 CAPSULE BY MOUTH 3 TIMES A DAY AS NEEDED FOR ANXIETY/SLEEP FOR UP TO 21 DAYS      amLODIPine (NORVASC) 5 mg tablet Take 1 Tablet by mouth daily. 30 Tablet 5    labetaloL (NORMODYNE) 100 mg tablet Take 1 Tablet by mouth every twelve (12) hours. 60 Tablet 5    FLUoxetine (PROzac) 20 mg capsule Take 1 Capsule by mouth daily. 30 Capsule 5    diclofenac (VOLTAREN) 1 % gel Apply 2 g to affected area every six (6) hours as needed for Pain (right knee pain). 200 g 1    naproxen EC (EC-Naproxen) 500 mg EC tablet naproxen 500 mg tablet   Take 1 tablet twice a day by oral route as needed for 60 days.  tiZANidine (ZANAFLEX) 4 mg tablet Take 4 mg by mouth two (2) times daily as needed. History reviewed. No pertinent past medical history.   Family History   Problem Relation Age of Onset    Diabetes Mother     Heart Attack Mother     Diabetes Father     Lupus Sister     Depression Other     Depression Paternal Uncle          ROS:  No swell, numb    Objective:  BP (!) 143/93 (BP 1 Location: Right arm, BP Patient Position: Sitting, BP Cuff Size: Adult)   Pulse 96   Temp 97.6 °F (36.4 °C) (Temporal)   Ht 5' 4\" (1.626 m)   Wt 187 lb (84.8 kg)   SpO2 96%   BMI 32.10 kg/m²   NEURO:  Sensation intact light touch B/L lower extremities. Reflexes +2/4 patellar and Achilles bilaterally. MS:  LE Strength +5/5 bilateral .   right Knee:  No Effusion .  Lachman negative.  Valgus negative.  Varus negative.  negative joint line tenderness medial.  Leyla negative.  Posterior drawer negative.  Crain compression test positive.  Patellar apprehension negative.  Patellar facet not tender to palpation medial mild crepitance right.  Pes anserine negative TTP right    Assessment/Plan:     ICD-10-CM ICD-9-CM    1. Patellofemoral disorder, right  M22.2X1 719.96 diclofenac (VOLTAREN) 1 % gel   2. Pes anserinus bursitis of right knee  M70.51 726.61 diclofenac (VOLTAREN) 1 % gel   3. Iliotibial band syndrome affecting right lower leg  M76.31 728.89 diclofenac (VOLTAREN) 1 % gel       Patient (or guardian if minor) verbalizes understanding of evaluation and plan. Will continue maintenance HEP and Rx for voltaren gel as above and plan follow-up 6 months.

## 2022-08-08 DIAGNOSIS — F41.9 ANXIETY: ICD-10-CM

## 2022-08-09 RX ORDER — HYDROXYZINE HYDROCHLORIDE 10 MG/1
10 TABLET, FILM COATED ORAL
Qty: 60 TABLET | Refills: 1 | OUTPATIENT
Start: 2022-08-09 | End: 2022-08-19

## 2022-08-12 ENCOUNTER — TELEPHONE (OUTPATIENT)
Dept: FAMILY MEDICINE CLINIC | Age: 57
End: 2022-08-12

## 2022-08-12 NOTE — TELEPHONE ENCOUNTER
Received fax from pharmacy patients fluoxetine is not covered by insurance the following are alternatives     Escitalopram 10 mg tablet   Citalopram HBR 20 mg   Sertraline 50 mg   Paroxetine 20 mg

## 2022-12-10 DIAGNOSIS — F41.9 ANXIETY: ICD-10-CM

## 2022-12-13 RX ORDER — HYDROXYZINE HYDROCHLORIDE 10 MG/1
10 TABLET, FILM COATED ORAL
Qty: 60 TABLET | Refills: 1 | OUTPATIENT
Start: 2022-12-13 | End: 2022-12-23

## 2022-12-16 RX ORDER — AMLODIPINE BESYLATE 5 MG/1
TABLET ORAL
Qty: 30 TABLET | Refills: 5 | OUTPATIENT
Start: 2022-12-16

## 2022-12-19 ENCOUNTER — VIRTUAL VISIT (OUTPATIENT)
Dept: FAMILY MEDICINE CLINIC | Age: 57
End: 2022-12-19
Payer: COMMERCIAL

## 2022-12-19 DIAGNOSIS — M25.519 CHRONIC SHOULDER PAIN, UNSPECIFIED LATERALITY: ICD-10-CM

## 2022-12-19 DIAGNOSIS — F41.9 ANXIETY: ICD-10-CM

## 2022-12-19 DIAGNOSIS — F32.A DEPRESSION, UNSPECIFIED DEPRESSION TYPE: ICD-10-CM

## 2022-12-19 DIAGNOSIS — R53.83 FATIGUE, UNSPECIFIED TYPE: ICD-10-CM

## 2022-12-19 DIAGNOSIS — R68.89 COLD INTOLERANCE: ICD-10-CM

## 2022-12-19 DIAGNOSIS — Z86.2 HISTORY OF ANEMIA: ICD-10-CM

## 2022-12-19 DIAGNOSIS — I10 HYPERTENSION, UNSPECIFIED TYPE: Primary | ICD-10-CM

## 2022-12-19 DIAGNOSIS — R07.9 CHEST PAIN, UNSPECIFIED TYPE: ICD-10-CM

## 2022-12-19 DIAGNOSIS — G89.29 CHRONIC SHOULDER PAIN, UNSPECIFIED LATERALITY: ICD-10-CM

## 2022-12-19 PROCEDURE — 99214 OFFICE O/P EST MOD 30 MIN: CPT | Performed by: FAMILY MEDICINE

## 2022-12-19 RX ORDER — AMLODIPINE BESYLATE 5 MG/1
5 TABLET ORAL DAILY
Qty: 30 TABLET | Refills: 5 | Status: SHIPPED | OUTPATIENT
Start: 2022-12-19

## 2022-12-19 RX ORDER — FLUOXETINE HYDROCHLORIDE 20 MG/1
20 CAPSULE ORAL DAILY
Qty: 30 CAPSULE | Refills: 5 | Status: SHIPPED | OUTPATIENT
Start: 2022-12-19

## 2022-12-19 NOTE — PROGRESS NOTES
Emanuel Blum is a 65 y.o. female who was seen by synchronous (real-time) audio-video technology on 12/19/2022 for Anxiety, Depression, Hypertension, Medication Refill, and Anemia ( Pt states that she feels she is anemic again  took iron in the past for this . States she feels cold tired and fatigued  would like levels checked . )        Assessment & Plan:   Diagnoses and all orders for this visit:    1. Hypertension, unspecified type  -     METABOLIC PANEL, COMPREHENSIVE; Future  -     LIPID PANEL; Future  -     amLODIPine (NORVASC) 5 mg tablet; Take 1 Tablet by mouth daily. Recommend routine home blood pressure monitoring. 2. Anxiety  3. Depression, unspecified depression type  -     FLUoxetine (PROzac) 20 mg capsule; Take 1 Capsule by mouth daily. 4.  Chronic shoulder pain, unspecified laterality  Patient declines evaluation    5. Chest pain, unspecified type  Patient reminded to see cardiology    6. Cold intolerance  -     TSH 3RD GENERATION; Future  -     CBC WITH AUTOMATED DIFF; Future  -     IRON PROFILE; Future  7. Fatigue, unspecified type  -     TSH 3RD GENERATION; Future  -     CBC WITH AUTOMATED DIFF; Future  -     IRON PROFILE; Future    8. History of anemia  -     TSH 3RD GENERATION; Future  -     CBC WITH AUTOMATED DIFF; Future  -     IRON PROFILE; Future    The complexity of medical decision making for this visit is moderate   Follow-up and Dispositions    Return for high blood pressure, depression, anxiety, lab review. 712  Subjective:   Patient contacted via [employer] virtual visit. Pt presents for delayed f/up of htn, anxiety, depression, shoulder pain. Pt reports that her mood is a bit better. She is still dealing with depression. Pt's shoulder pain is unchanged. She has not seen anyone for this lately. She had seen someone in [place] who suggested surgery. She does not want to do that. She does not feel that PT helps either so she just deals with it.       No recent bp checks. Pt did not see cards for eval of her chest pain. Pt needs med refills. Pt reports she has been anemic in the past.  She has fatigue and cold intolerance. She would like to have labs to evaluate this. Prior to Admission medications    Medication Sig Start Date End Date Taking? Authorizing Provider   amLODIPine (NORVASC) 5 mg tablet Take 1 Tablet by mouth daily. 12/19/22  Yes Merlinda Rote, MD   FLUoxetine (PROzac) 20 mg capsule Take 1 Capsule by mouth daily. 12/19/22  Yes Merlinda Rote, MD   hydrOXYzine pamoate (VISTARIL) 25 mg capsule hydroxyzine pamoate 25 mg capsule   TAKE 1 CAPSULE BY MOUTH 3 TIMES A DAY AS NEEDED FOR ANXIETY/SLEEP FOR UP TO 21 DAYS   Yes Provider, Historical   diclofenac (VOLTAREN) 1 % gel Apply 2 g to affected area every six (6) hours as needed for Pain (right knee pain). 7/8/22  Yes Sabine PENDLETON DO   labetaloL (NORMODYNE) 100 mg tablet Take 1 Tablet by mouth every twelve (12) hours. 6/10/22  Yes Merlinda Rote, MD   tiZANidine (ZANAFLEX) 4 mg tablet Take 4 mg by mouth two (2) times daily as needed. Yes Provider, Historical     Patient Active Problem List   Diagnosis Code    Moderate major depression ([facility] [address] F32.1    Depression F32. A    HTN (hypertension) I10    Mixed hyperlipidemia E78.2    Uterine leiomyoma D25.9    Vitamin D deficiency E55.9    GRETCHEN (iron deficiency anemia) D50.9    Other chronic pain G89.29    Chronic left shoulder pain M25.512, G89.29    Other chest pain R07.89    RLS (restless legs syndrome) G25.81    Obesity (BMI 30-39. 9) E66.9    Other viral warts B07.8    Prediabetes R73.03     Current Outpatient Medications   Medication Sig Dispense Refill    amLODIPine (NORVASC) 5 mg tablet Take 1 Tablet by mouth daily. 30 Tablet 5    FLUoxetine (PROzac) 20 mg capsule Take 1 Capsule by mouth daily.  30 Capsule 5    hydrOXYzine pamoate (VISTARIL) 25 mg capsule hydroxyzine pamoate 25 mg capsule   TAKE 1 CAPSULE BY MOUTH 3 TIMES A DAY AS NEEDED FOR ANXIETY/SLEEP FOR UP TO 21 DAYS      diclofenac (VOLTAREN) 1 % gel Apply 2 g to affected area every six (6) hours as needed for Pain (right knee pain). 400 g 1    labetaloL (NORMODYNE) 100 mg tablet Take 1 Tablet by mouth every twelve (12) hours. 60 Tablet 5    tiZANidine (ZANAFLEX) 4 mg tablet Take 4 mg by mouth two (2) times daily as needed. Allergies   Allergen Reactions    Prednisone Other (comments)     Coughs up blood      Diclofenac Nausea and Vomiting     History reviewed. No pertinent past medical history. Past Surgical History:   Procedure Laterality Date    HX SHOULDER ARTHROSCOPY Left 2017 and oct 2018     Family History   Problem Relation Age of Onset    Diabetes Mother     Heart Attack Mother     Diabetes Father     Lupus Sister     Depression Other     Depression Paternal Uncle      Social History     Tobacco Use    Smoking status: Never    Smokeless tobacco: Never   Substance Use Topics    Alcohol use: Yes     Comment: Occ       Review of Systems   Constitutional:  Positive for malaise/fatigue. Cold intolerance   HENT: Negative. Respiratory: Negative. Cardiovascular: Negative. Musculoskeletal:  Positive for joint pain. All other systems reviewed and are negative. Objective:   No flowsheet data found. General: alert, cooperative, no distress   Mental  status: normal mood, behavior, speech, dress, motor activity, and thought processes, able to follow commands   HENT: NCAT   Neck: no visualized mass   Resp: no respiratory distress   Neuro: no gross deficits   Skin: no discoloration or lesions of concern on visible areas   Psychiatric: normal affect, consistent with stated mood, no evidence of hallucinations     Additional exam findings: None      We discussed the expected course, resolution and complications of the diagnosis(es) in detail. Medication risks, benefits, costs, interactions, and alternatives were discussed as indicated.   I advised her to contact the office if her condition worsens, changes or fails to improve as anticipated. She expressed understanding with the diagnosis(es) and plan. Michael Andersen, was evaluated through a synchronous (real-time) audio-video encounter. The patient (or guardian if applicable) is aware that this is a billable service, which includes applicable co-pays. This Virtual Visit was conducted with patient's (and/or legal guardian's) consent. The visit was conducted pursuant to the emergency declaration under the [address] [place] [address], [address] [place] [address] authority and the [employer] and [employer] General Act. Patient identification was verified, and a caregiver was present when appropriate. The patient was located at: Home: [place] [phone]  The provider was located at:  Facility (Appt Department): [address] [place] [address] Monique Castillo [address] [place] [address]        Jack Valentin MD

## 2023-03-09 NOTE — PROGRESS NOTES
Telma Romero    CC: CP    HPI    Telma Romero is a 48 y.o. extremely pleasant  female with no known heart disease here for evaluation of persistent chest discomfort and shortness of breath. As you know patient has noticed pressure in her chest off and on for almost a year now. Describes a heavy pressure in the center and she tends to take a deep breath and hold it in and can make the pain go away. She also has some swelling in her legs and complains of dizziness. She might feel some shortness of breath but is not really with exertion. Actually she does not believe any of her symptoms are really with exertion but admits she is really not been able to be active due to issues with her shoulder. Takes quite a bit of medications for musculoskeletal problems but wanted to make sure that her heart was okay as her mother had premature coronary disease in her 46s. Otherwise she has not passed out and denies palpitations. It should be noted that she has been on Norvasc for many years but was recently increased to 10 mg daily is unclear when this medication change was made and patient herself was unable to tell me. She thinks it contributes to LE edema. I sent her for an echocardiogram and pharm nuc due to her persistent symptoms. While her nuclear stress test was read as abnormal, I personally reviewed the images myself and disagree with this. Very poor image acquisition with what looks like processing errors and attenuation artifact. CV RFs: HTN, +FH premature CAD    No past medical history on file. Past Surgical History:   Procedure Laterality Date    HX SHOULDER ARTHROSCOPY Left 2017 and oct 2018       Current Outpatient Medications   Medication Sig Dispense Refill    naproxen EC (EC-Naproxen) 500 mg EC tablet naproxen 500 mg tablet   Take 1 tablet twice a day by oral route as needed for 60 days.       ergocalciferol (ERGOCALCIFEROL) 1,250 mcg (50,000 unit) capsule Take 1 Cap by mouth What Type Of Note Output Would You Prefer (Optional)?: Standard Output How Severe Is Your Rash?: moderate every seven (7) days. 8 Cap 0    metoprolol succinate (TOPROL-XL) 50 mg XL tablet TAKE 1 TABLET BY MOUTH EVERY DAY 30 Tab 5    tiZANidine (ZANAFLEX) 4 mg tablet Take 4 mg by mouth two (2) times daily as needed.  ferrous sulfate (IRON) 325 mg (65 mg iron) EC tablet Take 1 Tab by mouth Daily (before breakfast). 90 Tab 1    ascorbic acid, vitamin C, (VITAMIN C) 250 mg tablet Take 1 Tab by mouth daily. 90 Tab 1    gabapentin (NEURONTIN) 300 mg capsule TAKE 1 CAPSULE BY MOUTH AT DINNER TIME AND 1 CAPSULE BY MOUTH AT BEDTIME  0    DULoxetine (CYMBALTA) 60 mg capsule duloxetine 60 mg capsule,delayed release   TAKE 1 CAPSULE(S) EVERY DAY BY ORAL ROUTE FOR 15 DAYS.  celecoxib (CELEBREX) 200 mg capsule Take 200 mg by mouth.  escitalopram oxalate (LEXAPRO) 20 mg tablet Take 1 Tab by mouth daily.  90 Tab 3       Allergies   Allergen Reactions    Prednisone Other (comments)     Coughs up blood         Social History     Socioeconomic History    Marital status: SINGLE     Spouse name: Not on file    Number of children: Not on file    Years of education: Not on file    Highest education level: Not on file   Occupational History    Not on file   Social Needs    Financial resource strain: Not on file    Food insecurity     Worry: Not on file     Inability: Not on file    Transportation needs     Medical: Not on file     Non-medical: Not on file   Tobacco Use    Smoking status: Never Smoker    Smokeless tobacco: Never Used   Substance and Sexual Activity    Alcohol use: No     Frequency: Never    Drug use: No    Sexual activity: Yes     Partners: Male   Lifestyle    Physical activity     Days per week: Not on file     Minutes per session: Not on file    Stress: Not on file   Relationships    Social connections     Talks on phone: Not on file     Gets together: Not on file     Attends Mosque service: Not on file     Active member of club or organization: Not on file     Attends meetings of clubs Is This A New Presentation, Or A Follow-Up?: Rash or organizations: Not on file     Relationship status: Not on file    Intimate partner violence     Fear of current or ex partner: Not on file     Emotionally abused: Not on file     Physically abused: Not on file     Forced sexual activity: Not on file   Other Topics Concern    Not on file   Social History Narrative    Not on file        FH: + premature CAD, no SCD    Review of Systems    14 pt Review of Systems is negative unless otherwise mentioned in the HPI. Wt Readings from Last 3 Encounters:   03/13/20 86.2 kg (190 lb)   02/27/20 87.5 kg (193 lb)   11/29/19 88.5 kg (195 lb)     Temp Readings from Last 3 Encounters:   02/27/20 97.6 °F (36.4 °C) (Oral)   11/26/19 97.5 °F (36.4 °C) (Oral)   11/19/19 98.2 °F (36.8 °C) (Oral)     BP Readings from Last 3 Encounters:   03/13/20 (!) 150/110   02/27/20 124/84   11/29/19 (!) 150/100     Pulse Readings from Last 3 Encounters:   03/13/20 88   02/27/20 83   11/29/19 (!) 106     Physical Exam:    There were no vitals taken for this visit. Physical Exam  Constitutional:       General: She is not in acute distress. Appearance: She is well-developed. She is not diaphoretic. HENT:      Head: Normocephalic and atraumatic. Eyes:      Pupils: Pupils are equal, round, and reactive to light. Cardiovascular:      Rate and Rhythm: Normal rate and regular rhythm. Heart sounds: Normal heart sounds. No murmur. No friction rub. No gallop. Comments: ++some tenderness to palpation anterior chest wall  Pulmonary:      Effort: Pulmonary effort is normal. No respiratory distress. Breath sounds: Normal breath sounds. No wheezing or rales. Chest:      Chest wall: No tenderness. Abdominal:      General: Bowel sounds are normal.      Palpations: Abdomen is soft. Musculoskeletal:         General: No tenderness. Skin:     General: Skin is warm and dry. Neurological:      Mental Status: She is alert and oriented to person, place, and time.          EKG Additional History: Patient had biopsy done but stated it didn’t come back as anything today shows: NSR, normal axis and intervals, no ST segment abnormalities    10/29/19   NUCLEAR CARDIAC STRESS TEST 10/30/2019 10/30/2019    Narrative · Baseline ECG: Normal sinus rhythm. · Inconclusive stress test.  · Gated SPECT: Left ventricular function post-stress was normal.   Calculated ejection fraction is 66%. Evidence of transient ischemic   dilation (TID). The TID ratio is 1.64. · Left ventricular perfusion is abnormal.  · Myocardial perfusion imaging defect 1: There is a defect that is small   in size with a moderate reduction in uptake present in the apical lateral   location(s) that is reversible. There is normal wall motion in the defect   area. The defect appears to be ischemia. · Myocardial perfusion imaging defect 2: There is a defect that is   moderate in size with a moderate reduction in uptake affecting the mid   inferolateral location(s) that is reversible. There is normal wall motion   in the defect area. The defect appears to be ischemia. Signed by: Guillaume Myrick MD     10/29/19   ECHO ADULT COMPLETE 10/29/2019 10/29/2019    Narrative · Left Ventricle: Normal cavity size and systolic function (ejection   fraction normal). Upper normal wall thickness. The estimated ejection   fraction is 61 - 65%. There is mild (grade 1) left ventricular diastolic   dysfunction. · Tricuspid Valve: Normal valve structure and no stenosis. Trace tricuspid   valve regurgitation. Pulmonary arterial systolic pressure is 95.3 mmHg. There is no evidence of pulmonary hypertension. Signed by: Guillaume Myrick MD         Impression and Plan:  Emerita Mcfarland is a 48 y. o. with:    1.) Atypical but persistent CP  2.) HTN, uncontrolled  3.) +FH premature CAD  4.) Severe MSK shoulder pain s/p surgery with decreased ROM, very well could be contributing/ cause of #1  5.) Normal echo   6.) suspect false positive nuc/ processing error    1.) Started Metoprolol 50 SR for better BP control last time and hoped it would improve symptoms but she wants to try something different- please stop Metoprolol today  2.) Will Try Propanolol 40 prn up to TID, pt instructed how to use, if doesn't work would stop trying medications as her pain is likely MSK then  3.) RTC ~ 4-6 months    Thank you for allowing me to participate in the care of your patient, please do not hesitate to call with questions or concerns.     Kindest Regards,    Juan Milan, DO

## 2023-04-24 ENCOUNTER — HOSPITAL ENCOUNTER (OUTPATIENT)
Facility: HOSPITAL | Age: 58
Discharge: HOME | End: 2023-04-27

## 2023-04-24 ENCOUNTER — OFFICE VISIT (OUTPATIENT)
Age: 58
End: 2023-04-24
Payer: COMMERCIAL

## 2023-04-24 VITALS — RESPIRATION RATE: 14 BRPM | HEIGHT: 64 IN | WEIGHT: 188 LBS | BODY MASS INDEX: 32.1 KG/M2

## 2023-04-24 DIAGNOSIS — M99.01 CERVICAL SOMATIC DYSFUNCTION: ICD-10-CM

## 2023-04-24 DIAGNOSIS — M22.2X1 PATELLOFEMORAL DISORDERS, RIGHT KNEE: Primary | ICD-10-CM

## 2023-04-24 DIAGNOSIS — M99.07 UPPER EXTREMITY SOMATIC DYSFUNCTION: ICD-10-CM

## 2023-04-24 DIAGNOSIS — M99.03 LUMBAR REGION SOMATIC DYSFUNCTION: ICD-10-CM

## 2023-04-24 DIAGNOSIS — M99.04 SACRAL REGION SOMATIC DYSFUNCTION: ICD-10-CM

## 2023-04-24 DIAGNOSIS — M99.08 RIB CAGE REGION SOMATIC DYSFUNCTION: ICD-10-CM

## 2023-04-24 DIAGNOSIS — M22.2X1 PATELLOFEMORAL DISORDERS, RIGHT KNEE: ICD-10-CM

## 2023-04-24 DIAGNOSIS — M99.02 THORACIC REGION SOMATIC DYSFUNCTION: ICD-10-CM

## 2023-04-24 DIAGNOSIS — M99.05 PELVIC SOMATIC DYSFUNCTION: ICD-10-CM

## 2023-04-24 DIAGNOSIS — M70.51 PES ANSERINUS BURSITIS OF RIGHT KNEE: ICD-10-CM

## 2023-04-24 DIAGNOSIS — M99.09 SOMATIC DYSFUNCTION OF ABDOMINAL REGION: ICD-10-CM

## 2023-04-24 DIAGNOSIS — M99.06 LOWER LIMB REGION SOMATIC DYSFUNCTION: ICD-10-CM

## 2023-04-24 PROCEDURE — 98929 OSTEOPATH MANJ 9-10 REGIONS: CPT | Performed by: FAMILY MEDICINE

## 2023-04-24 PROCEDURE — 99214 OFFICE O/P EST MOD 30 MIN: CPT | Performed by: FAMILY MEDICINE

## 2023-04-24 NOTE — PROGRESS NOTES
HISTORY OF PRESENT ILLNESS    Angel Jamison 1965 is a 63y.o. year old female comes in today to be evaluated and treated for: right knee pain - chronic w/ flare    Since last appt has noticed pain doing well until passed out due to dehydration at baseball game and knee sore after. Pain level 5/10. Using voltaren gel with benefit but needs refill    IMAGING: XR right knee pending    XR right knee 4/5/2021 images reviewed and agree with:  Trace joint space loss in the patellofemoral compartment suggest low-grade degenerative disease. No other findings. Past Surgical History:   Procedure Laterality Date    SHOULDER ARTHROSCOPY Left 2017 and oct 2018     Social History     Socioeconomic History    Marital status: Single     Spouse name: None    Number of children: None    Years of education: None    Highest education level: None   Tobacco Use    Smoking status: Never    Smokeless tobacco: Never   Substance and Sexual Activity    Alcohol use: Yes    Drug use: No     Current Outpatient Medications   Medication Sig Dispense Refill    amLODIPine (NORVASC) 5 MG tablet Take 1 tablet by mouth daily      diclofenac sodium (VOLTAREN) 1 % GEL Apply 2 g topically every 6 hours as needed      FLUoxetine (PROZAC) 20 MG capsule Take 1 capsule by mouth daily      hydrOXYzine pamoate (VISTARIL) 25 MG capsule hydroxyzine pamoate 25 mg capsule   TAKE 1 CAPSULE BY MOUTH 3 TIMES A DAY AS NEEDED FOR ANXIETY/SLEEP FOR UP TO 21 DAYS       No current facility-administered medications for this visit. History reviewed. No pertinent past medical history. Family History   Problem Relation Age of Onset    Depression Other     Depression Paternal Uncle     Diabetes Mother     Heart Attack Mother     Diabetes Father     Lupus Sister      ROS:  No swell, numb    Objective:  Resp 14   Ht 5' 4\" (1.626 m)   Wt 188 lb (85.3 kg)   BMI 32.27 kg/m²   NEURO:  Sensation intact light touch B/L lower extremities.  Reflexes +2/4 patellar and

## 2023-09-14 ENCOUNTER — TELEPHONE (OUTPATIENT)
Facility: CLINIC | Age: 58
End: 2023-09-14
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
## 2023-09-15 ENCOUNTER — OFFICE VISIT (OUTPATIENT)
Facility: CLINIC | Age: 58
End: 2023-09-15
Payer: COMMERCIAL

## 2023-09-15 VITALS
HEIGHT: 64 IN | HEART RATE: 77 BPM | DIASTOLIC BLOOD PRESSURE: 89 MMHG | BODY MASS INDEX: 31.92 KG/M2 | TEMPERATURE: 97.2 F | SYSTOLIC BLOOD PRESSURE: 142 MMHG | OXYGEN SATURATION: 100 % | WEIGHT: 187 LBS | RESPIRATION RATE: 17 BRPM

## 2023-09-15 DIAGNOSIS — M54.42 ACUTE BILATERAL LOW BACK PAIN WITH BILATERAL SCIATICA: ICD-10-CM

## 2023-09-15 DIAGNOSIS — E78.2 MIXED HYPERLIPIDEMIA: ICD-10-CM
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
## 2023-09-15 DIAGNOSIS — F32.A DEPRESSION, UNSPECIFIED DEPRESSION TYPE: ICD-10-CM

## 2023-09-15 DIAGNOSIS — M54.41 ACUTE BILATERAL LOW BACK PAIN WITH BILATERAL SCIATICA: ICD-10-CM

## 2023-09-15 PROBLEM — D50.9 IDA (IRON DEFICIENCY ANEMIA): Status: ACTIVE | Noted: 2017-06-15

## 2023-09-15 PROBLEM — G89.29 CHRONIC LEFT SHOULDER PAIN: Status: ACTIVE | Noted: 2019-04-25

## 2023-09-15 PROBLEM — E55.9 VITAMIN D DEFICIENCY: Status: ACTIVE | Noted: 2018-03-20

## 2023-09-15 PROBLEM — M25.512 CHRONIC LEFT SHOULDER PAIN: Status: ACTIVE | Noted: 2019-04-25

## 2023-09-15 PROBLEM — N93.9 ABNORMAL UTERINE BLEEDING (AUB): Status: ACTIVE | Noted: 2017-09-12

## 2023-09-15 PROBLEM — G47.9 SLEEP DISTURBANCE: Status: ACTIVE | Noted: 2018-04-25

## 2023-09-15 PROBLEM — G47.30 SLEEP DISORDER BREATHING: Status: ACTIVE | Noted: 2018-01-23

## 2023-09-15 PROBLEM — N93.9 ABNORMAL UTERINE BLEEDING: Status: ACTIVE | Noted: 2017-10-12

## 2023-09-15 PROBLEM — F33.2 SEVERE EPISODE OF RECURRENT MAJOR DEPRESSIVE DISORDER, WITHOUT PSYCHOTIC FEATURES (HCC): Status: ACTIVE | Noted: 2017-06-15

## 2023-09-15 PROBLEM — M67.919 DISORDER OF ROTATOR CUFF: Status: ACTIVE | Noted: 2018-03-20

## 2023-09-15 PROCEDURE — 3079F DIAST BP 80-89 MM HG: CPT

## 2023-09-15 PROCEDURE — 3077F SYST BP >= 140 MM HG: CPT

## 2023-09-15 PROCEDURE — 99214 OFFICE O/P EST MOD 30 MIN: CPT

## 2023-09-15 RX ORDER — KETOROLAC TROMETHAMINE 10 MG/1
TABLET, FILM COATED ORAL
COMMUNITY
Start: 2023-09-13 | End: 2023-09-15 | Stop reason: ALTCHOICE

## 2023-09-15 RX ORDER — LIDOCAINE 4 G/G
1 PATCH TOPICAL DAILY
Qty: 30 PATCH | Refills: 0 | Status: SHIPPED | OUTPATIENT
Start: 2023-09-15 | End: 2023-10-15

## 2023-09-15 RX ORDER — MELOXICAM 7.5 MG/1
7.5 TABLET ORAL DAILY PRN
Qty: 30 TABLET | Refills: 0 | Status: SHIPPED | OUTPATIENT
Start: 2023-09-15

## 2023-09-15 SDOH — ECONOMIC STABILITY: FOOD INSECURITY: WITHIN THE PAST 12 MONTHS, THE FOOD YOU BOUGHT JUST DIDN'T LAST AND YOU DIDN'T HAVE MONEY TO GET MORE.: OFTEN TRUE

## 2023-09-15 SDOH — ECONOMIC STABILITY: HOUSING INSECURITY
IN THE LAST 12 MONTHS, WAS THERE A TIME WHEN YOU DID NOT HAVE A STEADY PLACE TO SLEEP OR SLEPT IN A SHELTER (INCLUDING NOW)?: NO

## 2023-09-15 SDOH — ECONOMIC STABILITY: INCOME INSECURITY: HOW HARD IS IT FOR YOU TO PAY FOR THE VERY BASICS LIKE FOOD, HOUSING, MEDICAL CARE, AND HEATING?: SOMEWHAT HARD

## 2023-09-15 SDOH — ECONOMIC STABILITY: FOOD INSECURITY: WITHIN THE PAST 12 MONTHS, YOU WORRIED THAT YOUR FOOD WOULD RUN OUT BEFORE YOU GOT MONEY TO BUY MORE.: OFTEN TRUE

## 2023-09-15 ASSESSMENT — PATIENT HEALTH QUESTIONNAIRE - PHQ9
SUM OF ALL RESPONSES TO PHQ9 QUESTIONS 1 & 2: 6
8. MOVING OR SPEAKING SO SLOWLY THAT OTHER PEOPLE COULD HAVE NOTICED. OR THE OPPOSITE, BEING SO FIGETY OR RESTLESS THAT YOU HAVE BEEN MOVING AROUND A LOT MORE THAN USUAL: 1
2. FEELING DOWN, DEPRESSED OR HOPELESS: 3
SUM OF ALL RESPONSES TO PHQ QUESTIONS 1-9: 19
5. POOR APPETITE OR OVEREATING: 2
6. FEELING BAD ABOUT YOURSELF - OR THAT YOU ARE A FAILURE OR HAVE LET YOURSELF OR YOUR FAMILY DOWN: 3
9. THOUGHTS THAT YOU WOULD BE BETTER OFF DEAD, OR OF HURTING YOURSELF: 2
SUM OF ALL RESPONSES TO PHQ QUESTIONS 1-9: 21
4. FEELING TIRED OR HAVING LITTLE ENERGY: 3
SUM OF ALL RESPONSES TO PHQ QUESTIONS 1-9: 21
7. TROUBLE CONCENTRATING ON THINGS, SUCH AS READING THE NEWSPAPER OR WATCHING TELEVISION: 2
3. TROUBLE FALLING OR STAYING ASLEEP: 2
10. IF YOU CHECKED OFF ANY PROBLEMS, HOW DIFFICULT HAVE THESE PROBLEMS MADE IT FOR YOU TO DO YOUR WORK, TAKE CARE OF THINGS AT HOME, OR GET ALONG WITH OTHER PEOPLE: 1
1. LITTLE INTEREST OR PLEASURE IN DOING THINGS: 3
SUM OF ALL RESPONSES TO PHQ QUESTIONS 1-9: 21

## 2023-09-15 ASSESSMENT — COLUMBIA-SUICIDE SEVERITY RATING SCALE - C-SSRS
6. HAVE YOU EVER DONE ANYTHING, STARTED TO DO ANYTHING, OR PREPARED TO DO ANYTHING TO END YOUR LIFE?: NO
1. WITHIN THE PAST MONTH, HAVE YOU WISHED YOU WERE DEAD OR WISHED YOU COULD GO TO SLEEP AND NOT WAKE UP?: YES
2. HAVE YOU ACTUALLY HAD ANY THOUGHTS OF KILLING YOURSELF?: NO

## 2023-09-15 ASSESSMENT — ENCOUNTER SYMPTOMS
SHORTNESS OF BREATH: 0
BACK PAIN: 1

## 2023-09-15 ASSESSMENT — ANXIETY QUESTIONNAIRES
6. BECOMING EASILY ANNOYED OR IRRITABLE: 3
IF YOU CHECKED OFF ANY PROBLEMS ON THIS QUESTIONNAIRE, HOW DIFFICULT HAVE THESE PROBLEMS MADE IT FOR YOU TO DO YOUR WORK, TAKE CARE OF THINGS AT HOME, OR GET ALONG WITH OTHER PEOPLE: SOMEWHAT DIFFICULT
7. FEELING AFRAID AS IF SOMETHING AWFUL MIGHT HAPPEN: 1
GAD7 TOTAL SCORE: 15
4. TROUBLE RELAXING: 2
3. WORRYING TOO MUCH ABOUT DIFFERENT THINGS: 3
1. FEELING NERVOUS, ANXIOUS, OR ON EDGE: 2
5. BEING SO RESTLESS THAT IT IS HARD TO SIT STILL: 1
2. NOT BEING ABLE TO STOP OR CONTROL WORRYING: 3
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
## 2023-09-15 NOTE — ASSESSMENT & PLAN NOTE
Advised pt to f/u with psychiatry for management, given contact information for psychiatry offices  Advised pt to complete fasting labs   If labs within normal limits, consider restarting prozac 20 mg daily

## 2023-09-18 RX ORDER — LIDOCAINE 50 MG/G
1 PATCH TOPICAL DAILY
COMMUNITY
End: 2023-09-18 | Stop reason: SDUPTHER

## 2023-09-18 RX ORDER — LIDOCAINE 50 MG/G
1 PATCH TOPICAL DAILY
Qty: 30 PATCH | Refills: 0 | Status: SHIPPED | OUTPATIENT
Start: 2023-09-18

## 2023-09-18 NOTE — TELEPHONE ENCOUNTER
Pt called in to state that the lidocaine 4 % external patch [[id number]]   Was not covered and it will only be covered if it is the 5%

## 2023-09-23 DIAGNOSIS — I10 ESSENTIAL (PRIMARY) HYPERTENSION: ICD-10-CM

## 2023-09-25 RX ORDER — AMLODIPINE BESYLATE 5 MG/1
5 TABLET ORAL DAILY
Qty: 90 TABLET | Refills: 4 | Status: SHIPPED | OUTPATIENT
Start: 2023-09-25

## 2023-09-26 ENCOUNTER — HOSPITAL ENCOUNTER (OUTPATIENT)
Facility: HOSPITAL | Age: 58
Setting detail: RECURRING SERIES
Discharge: HOME | End: 2023-09-29
Payer: COMMERCIAL

## 2023-09-26 PROCEDURE — 97162 PT EVAL MOD COMPLEX 30 MIN: CPT

## 2023-09-26 PROCEDURE — 97110 THERAPEUTIC EXERCISES: CPT

## 2023-09-26 NOTE — PROGRESS NOTES
Patient complaints of numbness, stiffness and tingling in the neck and pain radiation to  the left arm till elbow. Reported adhesive capsulitis in the left shoulder with AROM of 60 degrees only. Her AROM of cervical flexion was 25 degrees, extension 5 degrees, right lateral flexion 20 degrees, left lateral flexion 10 degrees, right rotation 20 degrees, left rotation 23 degrees. Pain with all the AROM noted. weakness noted in the neck flexors, extensors, side flexors and rotators with a strength of 3+/5 grossly. Tenderness noted in the neck paraspinals, UT, LS and spinous processes. Cervical compression was positive. Low back SLR and slump test was positive today. Tenderness noted in the lumbar paraspinals. Limitations noted in the lumbar ROM with fingertips to floor distance in standing 11 inches . Strength impairments noted in the Bilateral LE with a gross strength of 3+/5 in hip musculature. Due to the above mentioned impairment such as pain, limited ROM , strength impairment, pt is unable to perform the functional tasks with ease and will be benefited from physical therapy sessions to address these impairments and functional deficits so pt can go back to her PLOF and abilities with less pain. Patient will continue to benefit from skilled PT / OT services to modify and progress therapeutic interventions, analyze and address functional mobility deficits, analyze and address ROM deficits, analyze and address strength deficits, analyze and address soft tissue restrictions, analyze and cue for proper movement patterns, analyze and modify for postural abnormalities, and analyze and address imbalance/dizziness to address functional deficits and attain remaining goals. Progress toward goals / Updated goals:  []  See Progress Note/Recertification    Short Term Goals: To be accomplished in 2 weeks  Patient will be independent and compliant with the HEP to maximize he therapeutic benefit of the exercises.   IE: HEP
per week for 6 weeks  Goals will be assigned and reassessed every 10 visits/ 30 days per guidelines . Patient/ Caregiver education and instruction: Diagnosis, prognosis, exercises [x]  Plan of care has been reviewed with ROSIO Cooper, PT       9/26/2023       6:32 AM    I certify that the above Therapy Services are being furnished while the patient is under my care. I agree with the treatment plan and certify that this therapy is necessary. Physician's Signature:_________________________   DATE:_________   TIME:________                           PASCUAL Rodriguez  Insurance: Payor: [id number] [facility] / Plan: [id number] [facility] / Product Type: *No Product type* /      ** Signature, Date and Time must be completed for valid certification **  [facility] or you may fax the signed copy to [phone]. Thank you.

## 2023-10-11 ENCOUNTER — HOSPITAL ENCOUNTER (OUTPATIENT)
Facility: HOSPITAL | Age: 58
Setting detail: RECURRING SERIES
Discharge: HOME | End: 2023-10-14
Payer: COMMERCIAL

## 2023-10-11 PROCEDURE — 97112 NEUROMUSCULAR REEDUCATION: CPT

## 2023-10-11 PROCEDURE — 97110 THERAPEUTIC EXERCISES: CPT

## 2023-10-11 PROCEDURE — 97530 THERAPEUTIC ACTIVITIES: CPT

## 2023-10-11 NOTE — PROGRESS NOTES
Exercise (timed):  increase ROM, strength, coordination, balance, and proprioception to improve patient's ability to progress to PLOF and address remaining functional goals. (see flow sheet as applicable)    Details if applicable:       8  02071 Neuromuscular Re-Education (timed):  improve balance, coordination, kinesthetic sense, posture, core stability and proprioception to improve patient's ability to develop conscious control of individual muscles and awareness of position of extremities in order to progress to PLOF and address remaining functional goals. (see flow sheet as applicable)    Details if applicable:     10  85500 Therapeutic Activity (timed):  use of dynamic activities replicating functional movements to increase ROM, strength, coordination, balance, and proprioception in order to improve patient's ability to progress to PLOF and address remaining functional goals. (see flow sheet as applicable)     Details if applicable:     45  [facility] Totals Reminder: bill using total billable min of TIMED therapeutic procedures (example: do not include dry needle or estim unattended, both untimed codes, in totals to left)  8-22 min = 1 unit; 23-37 min = 2 units; 38-52 min = 3 units; 53-67 min = 4 units; 68-82 min = 5 units   Total Total     TOTAL TREATMENT TIME:        48     [x]  Patient Education billed concurrently with other procedures   [x] Review HEP    [] Progressed/Changed HEP, detail:    [] Other detail:       Objective Information/Functional Measures/Assessment    LBP HEP was reviewed and demonstrated to the patient today. Pt reported compliance with the neck HEP today. Exercises initiated as per the flow sheet. Pt tolerated the session well with no increase in symptoms and distress post session today.     Patient will continue to benefit from skilled PT / OT services to modify and progress therapeutic interventions, analyze and address functional mobility deficits, analyze and address ROM deficits,

## 2023-10-12 PROBLEM — I10 ESSENTIAL (PRIMARY) HYPERTENSION: Status: ACTIVE | Noted: 2017-06-15

## 2023-10-12 PROBLEM — F32.A DEPRESSION: Status: ACTIVE | Noted: 2023-10-12

## 2023-10-12 PROBLEM — M54.42 ACUTE BILATERAL LOW BACK PAIN WITH BILATERAL SCIATICA: Status: ACTIVE | Noted: 2023-10-12

## 2023-10-12 PROBLEM — M54.41 ACUTE BILATERAL LOW BACK PAIN WITH BILATERAL SCIATICA: Status: ACTIVE | Noted: 2023-10-12
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
## 2023-10-13 ENCOUNTER — OFFICE VISIT (OUTPATIENT)
Facility: CLINIC | Age: 58
End: 2023-10-13
Payer: COMMERCIAL

## 2023-10-13 VITALS
DIASTOLIC BLOOD PRESSURE: 79 MMHG | HEART RATE: 76 BPM | SYSTOLIC BLOOD PRESSURE: 117 MMHG | RESPIRATION RATE: 18 BRPM | WEIGHT: 190.4 LBS | BODY MASS INDEX: 32.68 KG/M2 | OXYGEN SATURATION: 98 %

## 2023-10-13 DIAGNOSIS — M54.42 ACUTE BILATERAL LOW BACK PAIN WITH BILATERAL SCIATICA: ICD-10-CM
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
## 2023-10-13 DIAGNOSIS — F32.A DEPRESSION, UNSPECIFIED DEPRESSION TYPE: ICD-10-CM

## 2023-10-13 DIAGNOSIS — E78.2 MIXED HYPERLIPIDEMIA: ICD-10-CM

## 2023-10-13 DIAGNOSIS — M54.41 ACUTE BILATERAL LOW BACK PAIN WITH BILATERAL SCIATICA: ICD-10-CM

## 2023-10-13 DIAGNOSIS — I10 ESSENTIAL (PRIMARY) HYPERTENSION: Primary | ICD-10-CM

## 2023-10-13 PROCEDURE — 99214 OFFICE O/P EST MOD 30 MIN: CPT | Performed by: FAMILY MEDICINE

## 2023-10-13 PROCEDURE — 3074F SYST BP LT 130 MM HG: CPT | Performed by: FAMILY MEDICINE

## 2023-10-13 PROCEDURE — 3078F DIAST BP <80 MM HG: CPT | Performed by: FAMILY MEDICINE

## 2023-10-13 RX ORDER — LIDOCAINE 50 MG/G
1 PATCH TOPICAL DAILY
Qty: 30 PATCH | Refills: 5 | Status: SHIPPED | OUTPATIENT
Start: 2023-10-13

## 2023-10-13 RX ORDER — MELOXICAM 7.5 MG/1
7.5 TABLET ORAL DAILY PRN
Qty: 30 TABLET | Refills: 5 | Status: SHIPPED | OUTPATIENT
Start: 2023-10-13
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
## 2023-10-16 ASSESSMENT — ENCOUNTER SYMPTOMS
BACK PAIN: 1
RESPIRATORY NEGATIVE: 1

## 2023-10-17 ENCOUNTER — HOSPITAL ENCOUNTER (OUTPATIENT)
Facility: HOSPITAL | Age: 58
Setting detail: RECURRING SERIES
Discharge: HOME | End: 2023-10-20
Payer: COMMERCIAL

## 2023-10-17 PROCEDURE — 97110 THERAPEUTIC EXERCISES: CPT

## 2023-10-17 PROCEDURE — 97112 NEUROMUSCULAR REEDUCATION: CPT

## 2023-10-17 PROCEDURE — 97530 THERAPEUTIC ACTIVITIES: CPT

## 2023-10-17 NOTE — PROGRESS NOTES
deficits, analyze and address strength deficits, analyze and address soft tissue restrictions, analyze and cue for proper movement patterns, analyze and modify for postural abnormalities, and analyze and address imbalance/dizziness to address functional deficits and attain remaining goals. Progress toward goals / Updated goals:  []  See Progress Note/Recertification    Patient will be independent and compliant with the HEP to maximize he therapeutic benefit of the exercises. IE: HEP reviewed and demonstrated  Current: Partial compliance, 10/11/2023  Current; Met, per pt's report, 10/17/2023  2. Patient will improve lumbar flexion ROM as demonstrated by patient's ability to touch the floor with the fingertips to facilitate pt's ability to perform bending tasks at home and work with ease. IE: Fingertips to floor distance 11 inches  Current: Regressed, Fingertips to floor distance 13 inches   10/17/2023  Long Term Goals: To be accomplished in 6 weeks  Patient will improve FOTO score to 59 to demonstrate functional improvements. IE: FOTO score 33  2. Patient will improve cervical AROM to the normal range to improve pt's ability to drive and turn head for functional tasks with ease. IE: Cervical flexion 25 degrees, extension 5 degrees, right lateral flexion 20 degrees, left lateral flexion 10 degrees, right rotation 20 degrees and left rotation 23 degrees  3. Patient will report the pain levels to go down to level 2/10 in neck and lower back as demonstrated by the patient's ability to sleep through the night with ease. IE: Pain 8/10  4. Patient will improve the strength of bot hip flexors to 4+/5 in order to improve stability with ambulation. IE: 3+/5- Both hip flexors  5. Patient will improve strength of neck side flexors and rotators to 4+/5 as demonstrated by the improvement in pt's abilities to perform functional activities with ease. IE: 3+/5 strength in neck side flexors and rotators.     PLAN  Yes

## 2023-10-19 ENCOUNTER — HOSPITAL ENCOUNTER (OUTPATIENT)
Facility: HOSPITAL | Age: 58
Setting detail: RECURRING SERIES
Discharge: HOME | End: 2023-10-22
Payer: COMMERCIAL

## 2023-10-19 PROCEDURE — 97112 NEUROMUSCULAR REEDUCATION: CPT

## 2023-10-19 PROCEDURE — 97530 THERAPEUTIC ACTIVITIES: CPT

## 2023-10-19 PROCEDURE — 97110 THERAPEUTIC EXERCISES: CPT

## 2023-10-19 NOTE — PROGRESS NOTES
PHYSICAL / OCCUPATIONAL THERAPY - DAILY TREATMENT NOTE (updated )    Patient Name: Pauline Bashir    Date: 10/19/2023    : 1965  Insurance: Payor: [employer] [place] / Plan: [id number] / Product Type: *No Product type* /      Patient  verified Yes     Visit #   Current / Total 4 12   Time   In / Out 10:14 11:21   Pain   In / Out 8 7   Subjective Functional Status/Changes: Pt reported that she was working all night last night and did not get chance to take he pain medication this morning   Changes to: Allergies, Med Hx, Sx Hx?   no       TREATMENT AREA =  Cervicalgia [M54.2]    OBJECTIVE    Modalities Rationale:     decrease pain and increase tissue extensibility to improve patient's ability to progress to PLOF and address remaining functional goals. 10   min  unbilled []  Ice     [x]  Heat    location/position: C/S and L/S in sitting    min []  Paraffin,  details:     min []  Vasopneumatic Device, press/temp:     min []  Koby King / Mandi : If using vaso (only need to measure limb vaso being performed on)      pre-treatment girth :       post-treatment girth :       measured at (landmark location) :      min []  Other:    Skin assessment post-treatment (if applicable):    []  intact    []  redness- no adverse reaction                 []redness - adverse reaction:         Therapeutic Procedures: Tx Min Billable or 1:1 Min (if diff from Tx Min) Procedure, Rationale, Specifics   30  79869 Therapeutic Exercise (timed):  increase ROM, strength, coordination, balance, and proprioception to improve patient's ability to progress to PLOF and address remaining functional goals.  (see flow sheet as applicable)    Details if applicable:       12 25107 Therapeutic Activity (timed):  use of dynamic activities replicating functional movements to increase ROM, strength, coordination, balance, and proprioception in order to improve patient's ability to progress to PLOF and address

## 2023-10-24 ENCOUNTER — HOSPITAL ENCOUNTER (OUTPATIENT)
Facility: HOSPITAL | Age: 58
Setting detail: RECURRING SERIES
Discharge: HOME | End: 2023-10-27
Payer: COMMERCIAL

## 2023-10-24 PROCEDURE — 97530 THERAPEUTIC ACTIVITIES: CPT

## 2023-10-24 PROCEDURE — 97110 THERAPEUTIC EXERCISES: CPT

## 2023-10-24 PROCEDURE — 97112 NEUROMUSCULAR REEDUCATION: CPT
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
## 2023-10-26 ENCOUNTER — HOSPITAL ENCOUNTER (OUTPATIENT)
Facility: HOSPITAL | Age: 58
Setting detail: RECURRING SERIES
Discharge: HOME | End: 2023-10-29
Payer: COMMERCIAL

## 2023-10-26 PROCEDURE — 97110 THERAPEUTIC EXERCISES: CPT

## 2023-10-26 PROCEDURE — 97530 THERAPEUTIC ACTIVITIES: CPT

## 2023-10-26 PROCEDURE — 97112 NEUROMUSCULAR REEDUCATION: CPT

## 2023-10-26 NOTE — PROGRESS NOTES
Right lateral flexion 20 degrees, left lateral flexion 10 degrees, Rotation 30 degrees both sides, extension 10 degrees, flexion 25 degrees  3. Patient will report the pain levels to go down to level 2/10 in neck and lower back as demonstrated by the patient's ability to sleep through the night with ease. IE: Pain 8/10  PN: Still the same, 8/10  4. Patient will improve the strength of both hip flexors to 4+/5 in order to improve stability with ambulation. IE: 3+/5- Both hip flexors  PN: partially progressed, right hip flexors 4-/5, left 3+/5  5. Patient will improve strength of neck side flexors and rotators to 4+/5 as demonstrated by the improvement in pt's abilities to perform functional activities with ease. IE: 3+/5 strength in neck side flexors and rotators. PN: Still the same, 3+/5 in neck side flexors and rotators.     PLAN  Yes  Continue plan of care  []  Upgrade activities as tolerated  []  Discharge due to :  []  Other:    Freida Smiley PTA    10/26/2023    10:47 AM    Future Appointments   Date Time Provider [address] [place] [address]   10/30/2023 11:00 AM Balta Berrios MD [facility] BS AMB   10/31/2023 10:30 AM [facility]   11/2/2023 10:30 AM Freida Smiley PTA [facility]

## 2023-10-27 NOTE — PROGRESS NOTES
Tati Martinez (:  1965) is a 63 y.o. female,Established patient, here for evaluation of the following chief complaint(s):  Hypertension, Cholesterol Problem, and Discuss Labs ( Did not do labs )         ASSESSMENT/PLAN:  1. Essential (primary) hypertension  Assessment & Plan:   Borderline controlled, continue current medications. Reassess at f/u.   2. Mixed hyperlipidemia  Assessment & Plan:   Unclear control, await labs  3. Depression, unspecified depression type  Assessment & Plan:   Uncontrolled, medication adherence emphasized and resume prior med. recommend pt contact insurer for assistance in finding an in network therapist. Request a  for assistance if needed. Alternatively, consider changing to an insurance that is more widely accepted if it is still open enrollment. Orders:  -     FLUoxetine (PROZAC) 20 MG capsule; Take 1 capsule by mouth daily, Disp-30 capsule, R-5Normal      Return in about 4 weeks (around 2023) for medication change(s), HTN, HLD, lab review. Subjective   SUBJECTIVE/OBJECTIVE:  HPI    Pt presents for 2 wk f/u of htn, hld, depression, and lab review. Labs not yet completed. Pt would like to resume her depression med. She c/o feeling depressed. She has crying spells, does not feel like doing anything, her energy level is low. Pt's appetite is ok. She is not sleeping well. Pt has had trouble finding a therapist who accepts her insurance. Review of Systems   Constitutional: Negative. HENT: Negative. Respiratory: Negative. Cardiovascular: Negative. Psychiatric/Behavioral:  Positive for dysphoric mood. All other systems reviewed and are negative. Objective   Physical Exam  Vitals and nursing note reviewed. Constitutional:       General: She is not in acute distress. Appearance: Normal appearance. HENT:      Head: Normocephalic and atraumatic.       Right Ear: External ear normal.      Left Ear:

## 2023-10-30 ENCOUNTER — OFFICE VISIT (OUTPATIENT)
Facility: CLINIC | Age: 58
End: 2023-10-30
Payer: COMMERCIAL

## 2023-10-30 VITALS
RESPIRATION RATE: 16 BRPM | WEIGHT: 188.6 LBS | DIASTOLIC BLOOD PRESSURE: 90 MMHG | SYSTOLIC BLOOD PRESSURE: 128 MMHG | HEART RATE: 80 BPM | BODY MASS INDEX: 32.37 KG/M2 | OXYGEN SATURATION: 98 %

## 2023-10-30 DIAGNOSIS — I10 ESSENTIAL (PRIMARY) HYPERTENSION: Primary | ICD-10-CM

## 2023-10-30 DIAGNOSIS — E78.2 MIXED HYPERLIPIDEMIA: ICD-10-CM

## 2023-10-30 DIAGNOSIS — F32.A DEPRESSION, UNSPECIFIED DEPRESSION TYPE: ICD-10-CM

## 2023-10-30 PROCEDURE — 99214 OFFICE O/P EST MOD 30 MIN: CPT | Performed by: FAMILY MEDICINE

## 2023-10-30 PROCEDURE — 3074F SYST BP LT 130 MM HG: CPT | Performed by: FAMILY MEDICINE

## 2023-10-30 PROCEDURE — 3080F DIAST BP >= 90 MM HG: CPT | Performed by: FAMILY MEDICINE

## 2023-10-30 RX ORDER — FLUOXETINE HYDROCHLORIDE 20 MG/1
20 CAPSULE ORAL DAILY
Qty: 30 CAPSULE | Refills: 5 | Status: SHIPPED | OUTPATIENT
Start: 2023-10-30

## 2023-10-30 ASSESSMENT — ENCOUNTER SYMPTOMS: RESPIRATORY NEGATIVE: 1

## 2023-10-30 NOTE — PROGRESS NOTES
Chief Complaint   Patient presents with    Hypertension    Cholesterol Problem    Discuss Labs      Did not do labs         Vitals:    10/30/23 1115   BP: (!) 128/90   Pulse: 80   Resp: 16   SpO2: 98%        Depression: At risk (9/15/2023)    PHQ-2     PHQ-2 Score: 21             No data to display                     . \"Have you been to the ER, urgent care clinic since your last visit? Hospitalized since your last visit? \" No     2. \"Have you seen or consulted any other health care providers outside of the [address] [place] [address] since your last visit? \" No      3. For patients aged 43-73: Has the patient had a colonoscopy / FIT/ Cologuard? Yes     If the patient is female:    4. For patients aged 43-66: Has the patient had a mammogram within the past 2 years? No    5. For patients aged 21-65: Has the patient had a pap smear?  No

## 2023-10-30 NOTE — ASSESSMENT & PLAN NOTE
Uncontrolled, medication adherence emphasized and resume prior med. recommend pt contact insurer for assistance in finding an in network therapist. Request a  for assistance if needed. Alternatively, consider changing to an insurance that is more widely accepted if it is still open enrollment.

## 2023-10-31 ENCOUNTER — HOSPITAL ENCOUNTER (OUTPATIENT)
Facility: HOSPITAL | Age: 58
Setting detail: RECURRING SERIES
Discharge: HOME | End: 2023-11-03
Payer: COMMERCIAL

## 2023-10-31 PROCEDURE — 97110 THERAPEUTIC EXERCISES: CPT

## 2023-10-31 PROCEDURE — 97112 NEUROMUSCULAR REEDUCATION: CPT

## 2023-10-31 PROCEDURE — 97530 THERAPEUTIC ACTIVITIES: CPT

## 2023-10-31 NOTE — PROGRESS NOTES
Therapeutic Exercise (timed):  increase ROM, strength, coordination, balance, and proprioception to improve patient's ability to progress to PLOF and address remaining functional goals. (see flow sheet as applicable)    Details if applicable:       10  11819 Neuromuscular Re-Education (timed):  improve balance, coordination, kinesthetic sense, posture, core stability and proprioception to improve patient's ability to develop conscious control of individual muscles and awareness of position of extremities in order to progress to PLOF and address remaining functional goals. (see flow sheet as applicable)    Details if applicable:     12  69503 Therapeutic Activity (timed):  use of dynamic activities replicating functional movements to increase ROM, strength, coordination, balance, and proprioception in order to improve patient's ability to progress to PLOF and address remaining functional goals. (see flow sheet as applicable)     Details if applicable:     43  [facility] Totals Reminder: bill using total billable min of TIMED therapeutic procedures (example: do not include dry needle or estim unattended, both untimed codes, in totals to left)  8-22 min = 1 unit; 23-37 min = 2 units; 38-52 min = 3 units; 53-67 min = 4 units; 68-82 min = 5 units   Total Total     TOTAL TREATMENT TIME:        52     [x]  Patient Education billed concurrently with other procedures   [x] Review HEP    [] Progressed/Changed HEP, detail:    [] Other detail:       Objective Information/Functional Measures/Assessment    Needed Min cues for therex today. Pt tolerated the session well with no increase in symptoms or distress post session today.     Patient will continue to benefit from skilled PT / OT services to modify and progress therapeutic interventions, analyze and address functional mobility deficits, analyze and address ROM deficits, analyze and address strength deficits, analyze and address soft tissue restrictions, analyze and cue for

## 2023-11-02 ENCOUNTER — HOSPITAL ENCOUNTER (OUTPATIENT)
Facility: HOSPITAL | Age: 58
Setting detail: RECURRING SERIES
Discharge: HOME | End: 2023-11-05
Payer: COMMERCIAL

## 2023-11-02 PROCEDURE — 97110 THERAPEUTIC EXERCISES: CPT

## 2023-11-02 PROCEDURE — 97112 NEUROMUSCULAR REEDUCATION: CPT

## 2023-11-02 PROCEDURE — 97530 THERAPEUTIC ACTIVITIES: CPT

## 2023-11-02 NOTE — PROGRESS NOTES
PHYSICAL / OCCUPATIONAL THERAPY - DAILY TREATMENT NOTE (updated )    Patient Name: Sheyla Navarro    Date: 2023    : 1965  Insurance: Payor: [id number] / Plan: [id number] [employer] / Product Type: *No Product type* /      Patient  verified Yes     Visit #   Current / Total 8 12   Time   In / Out 1107 1217   Pain   In / Out 4-5 0 (sore)   Subjective Functional Status/Changes: Pt reports her neck and back are feeling pretty good today. Changes to: Allergies, Med Hx, Sx Hx?   no       TREATMENT AREA =  Cervicalgia [M54.2]    OBJECTIVE    Modalities Rationale:     decrease pain and increase tissue extensibility to improve patient's ability to progress to PLOF and address remaining functional goals. min [] Estim Unattended, type/location:                                      []  w/ice    []  w/heat    min [] Estim Attended, type/location:                                     []  w/US     []  w/ice    []  w/heat    []  TENS insruct      min []  Mechanical Traction: type/lbs                   []  pro   []  sup   []  int   []  cont    []  before manual    []  after manual    min []  Ultrasound, settings/location:      min []  Iontophoresis w/ dexamethasone, location:                                               []  take home patch       []  in clinic     10   min  unbilled []  Ice     [x]  Heat    location/position: C/S and L/S in sitting    min []  Paraffin,  details:     min []  Vasopneumatic Device, press/temp:     min []  Enmanuel Bennett / Jennifer Sea: If using vaso (only need to measure limb vaso being performed on)      pre-treatment girth :       post-treatment girth :       measured at (landmark location) :      min []  Other:    Skin assessment post-treatment (if applicable):    [x]  intact    [x]  redness- no adverse reaction                 []redness - adverse reaction:         Therapeutic Procedures:   Tx Min Billable or 1:1 Min (if diff from [employer]) Procedure, Rationale,

## 2023-11-08 ENCOUNTER — HOSPITAL ENCOUNTER (OUTPATIENT)
Facility: HOSPITAL | Age: 58
Setting detail: RECURRING SERIES
Discharge: HOME | End: 2023-11-11
Payer: COMMERCIAL

## 2023-11-08 PROCEDURE — 97112 NEUROMUSCULAR REEDUCATION: CPT

## 2023-11-08 PROCEDURE — 97530 THERAPEUTIC ACTIVITIES: CPT

## 2023-11-08 PROCEDURE — 97110 THERAPEUTIC EXERCISES: CPT

## 2023-11-08 NOTE — PROGRESS NOTES
pt's abilities to perform functional activities with ease. IE: 3+/5 strength in neck side flexors and rotators. PN: Still the same, 3+/5 in neck side flexors and rotators.       PLAN  Yes  Continue plan of care  []  Upgrade activities as tolerated  []  Discharge due to :  []  Other:    Sendy Simmons PTA    11/8/2023    1:37 PM    Future Appointments   Date Time Provider [address]   11/8/2023  1:50 PM Sendy Simmons PTA [facility]   11/15/2023 11:10 AM Sendy Simmons PTA [facility]   11/17/2023  1:50 PM Sendy Simmons PTA [facility]   11/20/2023  1:50 PM Sendy Simmons, PTA [facility]   11/27/2023 10:00 AM Casie Benavides MD [facility] BS AMB

## 2023-11-15 ENCOUNTER — HOSPITAL ENCOUNTER (OUTPATIENT)
Facility: HOSPITAL | Age: 58
Setting detail: RECURRING SERIES
Discharge: HOME | End: 2023-11-18
Payer: COMMERCIAL

## 2023-11-15 PROCEDURE — 97110 THERAPEUTIC EXERCISES: CPT

## 2023-11-15 PROCEDURE — 97530 THERAPEUTIC ACTIVITIES: CPT

## 2023-11-15 PROCEDURE — 97112 NEUROMUSCULAR REEDUCATION: CPT

## 2023-11-15 NOTE — PROGRESS NOTES
left lateral flexion 10 degrees, Rotation 30 degrees both sides, extension 10 degrees, flexion 25 degrees     3. Patient will report the pain levels to go down to level 2/10 in neck and lower back as demonstrated by the patient's ability to sleep through the night with ease. IE: Pain 8/10  PN: Still the same, 8/10     4. Patient will improve the strength of both hip flexors to 4+/5 in order to improve stability with ambulation. IE: 3+/5- Both hip flexors  PN: partially progressed, right hip flexors 4-/5, left 3+/5     5. Patient will improve strength of neck side flexors and rotators to 4+/5 as demonstrated by the improvement in pt's abilities to perform functional activities with ease. IE: 3+/5 strength in neck side flexors and rotators. PN: Still the same, 3+/5 in neck side flexors and rotators.       PLAN  Yes  Continue plan of care  []  Upgrade activities as tolerated  []  Discharge due to :  []  Other:    Junaid Logan PTA    11/15/2023    10:50 AM    Future Appointments   Date Time Provider [address] [place] [address]   11/15/2023 11:10 AM Junaid Logan PTA [facility]   11/17/2023  1:50 PM Junaid Logan PTA [facility]   11/20/2023  1:50 PM Junaid Logan PTA [facility]   11/27/2023 10:00 AM Kim Genao MD [facility] BS AMB

## 2023-11-20 ENCOUNTER — HOSPITAL ENCOUNTER (OUTPATIENT)
Facility: HOSPITAL | Age: 58
Setting detail: RECURRING SERIES
Discharge: HOME | End: 2023-11-23
Payer: COMMERCIAL

## 2023-11-20 PROCEDURE — 97110 THERAPEUTIC EXERCISES: CPT

## 2023-11-20 PROCEDURE — 97112 NEUROMUSCULAR REEDUCATION: CPT

## 2023-11-20 PROCEDURE — 97530 THERAPEUTIC ACTIVITIES: CPT

## 2023-11-20 NOTE — PROGRESS NOTES
PHYSICAL / OCCUPATIONAL THERAPY - DAILY TREATMENT NOTE (updated )    Patient Name: Clark Munoz    Date: 2023    : 1965  Insurance: Payor: [employer] [facility] / Plan: [id number] Us y 1 / Product Type: *No Product type* /      Patient  verified Yes     Visit #   Current / Total 11 12   Time   In / Out 153 250   Pain   In / Out 1 1   Subjective Functional Status/Changes: Pt reports she is not feeling too bad at all today. Changes to: Allergies, Med Hx, Sx Hx?   no       TREATMENT AREA =  Cervicalgia [M54.2]    OBJECTIVE    Modalities Rationale:     decrease pain and increase tissue extensibility to improve patient's ability to progress to PLOF and address remaining functional goals. min [] Estim Unattended, type/location:                                      []  w/ice    []  w/heat    min [] Estim Attended, type/location:                                     []  w/US     []  w/ice    []  w/heat    []  TENS insruct      min []  Mechanical Traction: type/lbs                   []  pro   []  sup   []  int   []  cont    []  before manual    []  after manual    min []  Ultrasound, settings/location:      min []  Iontophoresis w/ dexamethasone, location:                                               []  take home patch       []  in clinic     10   min  unbilled []  Ice     [x]  Heat    location/position: C/S and L/S    min []  Paraffin,  details:     min []  Vasopneumatic Device, press/temp:     min []  Sharion Khan / Michela Laws: If using vaso (only need to measure limb vaso being performed on)      pre-treatment girth :       post-treatment girth :       measured at (landmark location) :      min []  Other:    Skin assessment post-treatment (if applicable):    [x]  intact    [x]  redness- no adverse reaction                 []redness - adverse reaction:         Therapeutic Procedures:   Tx Min Billable or 1:1 Min (if diff from Tx Min) Procedure, Rationale, Specifics   22  [id number]

## 2023-11-22 ENCOUNTER — HOSPITAL ENCOUNTER (OUTPATIENT)
Facility: HOSPITAL | Age: 58
Setting detail: RECURRING SERIES
Discharge: HOME | End: 2023-11-25
Payer: COMMERCIAL

## 2023-11-22 PROCEDURE — 97530 THERAPEUTIC ACTIVITIES: CPT

## 2023-11-22 PROCEDURE — 97110 THERAPEUTIC EXERCISES: CPT

## 2023-11-22 PROCEDURE — 97112 NEUROMUSCULAR REEDUCATION: CPT

## 2023-11-22 NOTE — PROGRESS NOTES
PHYSICAL / OCCUPATIONAL THERAPY - DAILY TREATMENT NOTE (updated )    Patient Name: Edwina Loyola    Date: 2023    : 1965  Insurance: Payor: [employer] [facility] / Plan: [id number] Us y 1 / Product Type: *No Product type* /      Patient  verified Yes     Visit #   Current / Total 12 12   Time   In / Out 11:10 11:59   Pain   In / Out 0/10 0/10   Subjective Functional Status/Changes: The patient arrived to PT stated she has been feeling pretty good. She states she feels ready to continue with HEP and self manage there instead of continuing PT for a few more weeks. Changes to: Allergies, Med Hx, Sx Hx?   no       TREATMENT AREA =  Cervicalgia [M54.2]    OBJECTIVE    Modalities Rationale:     decrease pain and increase tissue extensibility to improve patient's ability to progress to PLOF and address remaining functional goals. 10   min  unbilled []  Ice     [x]  Heat    location/position:  Cervical spine/lumbar spine, pt seated   Skin assessment post-treatment (if applicable):    []  intact    []  redness- no adverse reaction                 []redness - adverse reaction:         Therapeutic Procedures: Tx Min Billable or 1:1 Min (if diff from Tx Min) Procedure, Rationale, Specifics   15  69152 Therapeutic Exercise (timed):  increase ROM, strength, coordination, balance, and proprioception to improve patient's ability to progress to PLOF and address remaining functional goals. (see flow sheet as applicable)    Details if applicable:       14  11090 Neuromuscular Re-Education (timed):  improve balance, coordination, kinesthetic sense, posture, core stability and proprioception to improve patient's ability to develop conscious control of individual muscles and awareness of position of extremities in order to progress to PLOF and address remaining functional goals.  (see flow sheet as applicable)    Details if applicable:     10  28656 Therapeutic Activity (timed):  use of dynamic
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
## 2023-11-26 ASSESSMENT — ENCOUNTER SYMPTOMS: RESPIRATORY NEGATIVE: 1

## 2023-11-26 NOTE — PROGRESS NOTES
ASSESSMENT/PLAN:  1. Essential (primary) hypertension  Assessment & Plan:   Well-controlled, continue current medications  Orders:  -     Lipid Panel; Future  -     Comprehensive Metabolic Panel; Future  2. Mixed hyperlipidemia  -     Lipid Panel; Future  -     Comprehensive Metabolic Panel; Future  3. Depression, unspecified depression type  Assessment & Plan:   Uncontrolled, changes made today: dose adjusted  Orders:  -     FLUoxetine (PROZAC) 40 MG capsule; Take 1 capsule by mouth daily, Disp-30 capsule, R-5Normal  4. Encounter for screening mammogram for malignant neoplasm of breast  -     [facility] DIGITAL SCREEN BILATERAL; Future        Return in about 4 weeks (around 12/25/2023) for depression, medication change(s). Labs for 3 month f/u. SUBJECTIVE/OBJECTIVE:    Chief Complaint   Patient presents with    Depression      Patient has not noticed a difference in her medication . Hypertension    Other      Patient states she has not had mammo because she is unable lift her left arm  is there any alternative to this so she can  have her screening          HPI    Aniyah Chery is a 63 y.o. female presenting today for    4 weeks  follow up of htn, hld, depression. Pt does not note much improvement with prozac 20mg. Patient had labs on 11/14/23. Labs reviewed in detail with patient     Pt reports that her back pain is somewhat improved;  she has  completed PT. Pt is doing her HEP. Patient does not need medication refills today. New concerns today: pt has not had a mammo due to restricted ROM of the L shoulder. Review of Systems   Constitutional: Negative. HENT: Negative. Respiratory: Negative. Cardiovascular: Negative. All other systems reviewed and are negative. Physical Exam  Vitals and nursing note reviewed. Constitutional:       General: She is not in acute distress. Appearance: Normal appearance. HENT:      Head: Normocephalic and atraumatic.

## 2023-11-27 ENCOUNTER — OFFICE VISIT (OUTPATIENT)
Facility: CLINIC | Age: 58
End: 2023-11-27
Payer: COMMERCIAL

## 2023-11-27 VITALS
RESPIRATION RATE: 18 BRPM | SYSTOLIC BLOOD PRESSURE: 118 MMHG | BODY MASS INDEX: 32.65 KG/M2 | WEIGHT: 190.2 LBS | DIASTOLIC BLOOD PRESSURE: 80 MMHG | OXYGEN SATURATION: 97 % | HEART RATE: 80 BPM

## 2023-11-27 DIAGNOSIS — E78.2 MIXED HYPERLIPIDEMIA: ICD-10-CM

## 2023-11-27 DIAGNOSIS — I10 ESSENTIAL (PRIMARY) HYPERTENSION: Primary | ICD-10-CM

## 2023-11-27 DIAGNOSIS — F32.A DEPRESSION, UNSPECIFIED DEPRESSION TYPE: ICD-10-CM

## 2023-11-27 DIAGNOSIS — Z12.31 ENCOUNTER FOR SCREENING MAMMOGRAM FOR MALIGNANT NEOPLASM OF BREAST: ICD-10-CM

## 2023-11-27 PROCEDURE — 99214 OFFICE O/P EST MOD 30 MIN: CPT | Performed by: FAMILY MEDICINE

## 2023-11-27 PROCEDURE — 3074F SYST BP LT 130 MM HG: CPT | Performed by: FAMILY MEDICINE

## 2023-11-27 PROCEDURE — 3078F DIAST BP <80 MM HG: CPT | Performed by: FAMILY MEDICINE

## 2023-11-27 RX ORDER — FLUOXETINE HYDROCHLORIDE 40 MG/1
40 CAPSULE ORAL DAILY
Qty: 30 CAPSULE | Refills: 5 | Status: SHIPPED | OUTPATIENT
Start: 2023-11-27

## 2023-11-27 NOTE — PROGRESS NOTES
Chief Complaint   Patient presents with    Depression      Patient has not noticed a difference in her medication . Hypertension    Other      Patient states she has not had mammo because she is unable lift her left arm  is there any alternative to this so she can  have her screening         Vitals:    11/27/23 0959   BP: 118/80   Pulse: 80   Resp: 18   SpO2: 97%        Depression: At risk (9/15/2023)    PHQ-2     PHQ-2 Score: 21             No data to display                 \    . \"Have you been to the ER, urgent care clinic since your last visit? Hospitalized since your last visit? \" No     2. \"Have you seen or consulted any other health care providers outside of the [address] [place] [address] since your last visit? \" No      3. For patients aged 43-73: Has the patient had a colonoscopy / FIT/ Cologuard? Yes     If the patient is female:    4. For patients aged 43-66: Has the patient had a mammogram within the past 2 years? No    5. For patients aged 21-65: Has the patient had a pap smear?  No Impression: Type 2 diab w mild nonprlf diabetic rtnop w/o macular edema, bilateral: [id number]. OU. Plan: OCT ordered and performed today. Discussed diagnosis with patient. The clinical exam was consistent with Type 2 diabetes. The patient was advised to maintain tight blood sugar control, blood pressure and lipid control. Patient was also advised to keep all appointments with PCP for diabetic evaluation and counseling to avoid the systemic complications of diabetes.

## 2024-01-23 DIAGNOSIS — M22.2X1 PATELLOFEMORAL DISORDERS, RIGHT KNEE: ICD-10-CM

## 2024-01-23 DIAGNOSIS — M70.51 PES ANSERINUS BURSITIS OF RIGHT KNEE: ICD-10-CM

## 2024-03-23 DIAGNOSIS — M22.2X1 PATELLOFEMORAL DISORDERS, RIGHT KNEE: ICD-10-CM

## 2024-03-23 DIAGNOSIS — M70.51 PES ANSERINUS BURSITIS OF RIGHT KNEE: ICD-10-CM

## 2024-03-28 ENCOUNTER — TELEPHONE (OUTPATIENT)
Age: 59
End: 2024-03-28

## 2024-03-28 NOTE — TELEPHONE ENCOUNTER
I have contacted this patient through My-Chart for the purpose of assisting her in scheduling her annual mammogram.

## 2024-04-11 DIAGNOSIS — M22.2X1 PATELLOFEMORAL DISORDERS, RIGHT KNEE: ICD-10-CM

## 2024-04-11 DIAGNOSIS — M70.51 PES ANSERINUS BURSITIS OF RIGHT KNEE: ICD-10-CM

## 2024-05-01 DIAGNOSIS — M22.2X1 PATELLOFEMORAL DISORDERS, RIGHT KNEE: ICD-10-CM

## 2024-05-01 DIAGNOSIS — M70.51 PES ANSERINUS BURSITIS OF RIGHT KNEE: ICD-10-CM
